# Patient Record
Sex: MALE | Race: BLACK OR AFRICAN AMERICAN | NOT HISPANIC OR LATINO | Employment: UNEMPLOYED | URBAN - METROPOLITAN AREA
[De-identification: names, ages, dates, MRNs, and addresses within clinical notes are randomized per-mention and may not be internally consistent; named-entity substitution may affect disease eponyms.]

---

## 2019-09-22 ENCOUNTER — HOSPITAL ENCOUNTER (EMERGENCY)
Facility: HOSPITAL | Age: 59
Discharge: HOME | End: 2019-09-22
Attending: EMERGENCY MEDICINE

## 2019-09-22 VITALS
BODY MASS INDEX: 23.54 KG/M2 | OXYGEN SATURATION: 98 % | DIASTOLIC BLOOD PRESSURE: 65 MMHG | HEART RATE: 87 BPM | SYSTOLIC BLOOD PRESSURE: 112 MMHG | TEMPERATURE: 99 F | HEIGHT: 67 IN | RESPIRATION RATE: 23 BRPM | WEIGHT: 150 LBS

## 2019-09-22 DIAGNOSIS — R55 SYNCOPE: ICD-10-CM

## 2019-09-22 DIAGNOSIS — R55 SYNCOPE, UNSPECIFIED SYNCOPE TYPE: Primary | ICD-10-CM

## 2019-09-22 DIAGNOSIS — F10.929 ALCOHOLIC INTOXICATION WITH COMPLICATION: ICD-10-CM

## 2019-09-22 LAB
ALBUMIN SERPL BCP-MCNC: 4 G/DL (ref 3.5–5.2)
ALP SERPL-CCNC: 147 U/L (ref 55–135)
ALT SERPL W/O P-5'-P-CCNC: 31 U/L (ref 10–44)
AMPHET+METHAMPHET UR QL: NEGATIVE
ANION GAP SERPL CALC-SCNC: 12 MMOL/L (ref 8–16)
AST SERPL-CCNC: 62 U/L (ref 10–40)
BARBITURATES UR QL SCN>200 NG/ML: NEGATIVE
BASOPHILS # BLD AUTO: 0.03 K/UL (ref 0–0.2)
BASOPHILS NFR BLD: 0.6 % (ref 0–1.9)
BENZODIAZ UR QL SCN>200 NG/ML: NEGATIVE
BILIRUB SERPL-MCNC: 0.3 MG/DL (ref 0.1–1)
BILIRUB UR QL STRIP: NEGATIVE
BNP SERPL-MCNC: 54 PG/ML (ref 0–99)
BUN SERPL-MCNC: 5 MG/DL (ref 6–20)
BZE UR QL SCN: NEGATIVE
CALCIUM SERPL-MCNC: 8.5 MG/DL (ref 8.7–10.5)
CANNABINOIDS UR QL SCN: NORMAL
CHLORIDE SERPL-SCNC: 102 MMOL/L (ref 95–110)
CLARITY UR: CLEAR
CO2 SERPL-SCNC: 23 MMOL/L (ref 23–29)
COLOR UR: ABNORMAL
CREAT SERPL-MCNC: 0.7 MG/DL (ref 0.5–1.4)
CREAT UR-MCNC: 26 MG/DL (ref 23–375)
DIFFERENTIAL METHOD: ABNORMAL
EOSINOPHIL # BLD AUTO: 0 K/UL (ref 0–0.5)
EOSINOPHIL NFR BLD: 0.6 % (ref 0–8)
ERYTHROCYTE [DISTWIDTH] IN BLOOD BY AUTOMATED COUNT: 14.5 % (ref 11.5–14.5)
EST. GFR  (AFRICAN AMERICAN): >60 ML/MIN/1.73 M^2
EST. GFR  (NON AFRICAN AMERICAN): >60 ML/MIN/1.73 M^2
ETHANOL SERPL-MCNC: 276 MG/DL
GLUCOSE SERPL-MCNC: 107 MG/DL (ref 70–110)
GLUCOSE UR QL STRIP: NEGATIVE
HCT VFR BLD AUTO: 35.2 % (ref 40–54)
HGB BLD-MCNC: 12.2 G/DL (ref 14–18)
HGB UR QL STRIP: ABNORMAL
HYALINE CASTS #/AREA URNS LPF: 2 /LPF
KETONES UR QL STRIP: NEGATIVE
LEUKOCYTE ESTERASE UR QL STRIP: NEGATIVE
LYMPHOCYTES # BLD AUTO: 3 K/UL (ref 1–4.8)
LYMPHOCYTES NFR BLD: 63.6 % (ref 18–48)
MCH RBC QN AUTO: 31.3 PG (ref 27–31)
MCHC RBC AUTO-ENTMCNC: 34.7 G/DL (ref 32–36)
MCV RBC AUTO: 90 FL (ref 82–98)
METHADONE UR QL SCN>300 NG/ML: NEGATIVE
MICROSCOPIC COMMENT: ABNORMAL
MONOCYTES # BLD AUTO: 0.6 K/UL (ref 0.3–1)
MONOCYTES NFR BLD: 11.9 % (ref 4–15)
NEUTROPHILS # BLD AUTO: 1.1 K/UL (ref 1.8–7.7)
NEUTROPHILS NFR BLD: 23.5 % (ref 38–73)
NITRITE UR QL STRIP: NEGATIVE
OPIATES UR QL SCN: NEGATIVE
PCP UR QL SCN>25 NG/ML: NEGATIVE
PH UR STRIP: 5 [PH] (ref 5–8)
PLATELET # BLD AUTO: 144 K/UL (ref 150–350)
PMV BLD AUTO: 9 FL (ref 9.2–12.9)
POTASSIUM SERPL-SCNC: 3.6 MMOL/L (ref 3.5–5.1)
PROT SERPL-MCNC: 7.6 G/DL (ref 6–8.4)
PROT UR QL STRIP: NEGATIVE
RBC # BLD AUTO: 3.9 M/UL (ref 4.6–6.2)
RBC #/AREA URNS HPF: 1 /HPF (ref 0–4)
SODIUM SERPL-SCNC: 137 MMOL/L (ref 136–145)
SP GR UR STRIP: 1 (ref 1–1.03)
TOXICOLOGY INFORMATION: NORMAL
TROPONIN I SERPL DL<=0.01 NG/ML-MCNC: <0.006 NG/ML (ref 0–0.03)
TSH SERPL DL<=0.005 MIU/L-ACNC: 0.97 UIU/ML (ref 0.4–4)
URN SPEC COLLECT METH UR: ABNORMAL
UROBILINOGEN UR STRIP-ACNC: NEGATIVE EU/DL
WBC # BLD AUTO: 4.72 K/UL (ref 3.9–12.7)

## 2019-09-22 PROCEDURE — 63600175 PHARM REV CODE 636 W HCPCS: Performed by: EMERGENCY MEDICINE

## 2019-09-22 PROCEDURE — 93010 EKG 12-LEAD: ICD-10-PCS | Mod: ,,, | Performed by: INTERNAL MEDICINE

## 2019-09-22 PROCEDURE — 93005 ELECTROCARDIOGRAM TRACING: CPT

## 2019-09-22 PROCEDURE — 80307 DRUG TEST PRSMV CHEM ANLYZR: CPT

## 2019-09-22 PROCEDURE — 84443 ASSAY THYROID STIM HORMONE: CPT

## 2019-09-22 PROCEDURE — 83880 ASSAY OF NATRIURETIC PEPTIDE: CPT

## 2019-09-22 PROCEDURE — 93010 ELECTROCARDIOGRAM REPORT: CPT | Mod: ,,, | Performed by: INTERNAL MEDICINE

## 2019-09-22 PROCEDURE — 80053 COMPREHEN METABOLIC PANEL: CPT

## 2019-09-22 PROCEDURE — 84484 ASSAY OF TROPONIN QUANT: CPT

## 2019-09-22 PROCEDURE — 81000 URINALYSIS NONAUTO W/SCOPE: CPT | Mod: 59

## 2019-09-22 PROCEDURE — 99285 EMERGENCY DEPT VISIT HI MDM: CPT | Mod: 25

## 2019-09-22 PROCEDURE — 80320 DRUG SCREEN QUANTALCOHOLS: CPT

## 2019-09-22 PROCEDURE — 85025 COMPLETE CBC W/AUTO DIFF WBC: CPT

## 2019-09-22 RX ADMIN — SODIUM CHLORIDE 1000 ML: 0.9 INJECTION, SOLUTION INTRAVENOUS at 07:09

## 2019-09-22 NOTE — ED TRIAGE NOTES
"Patient alert and oriented and able to ambulate to the stretcher with possible seizure or syncopal episode. Patient states he remembers watching the game then was told he "passed out" and urinated on himself. Patient denies seizure history  "

## 2019-09-23 NOTE — ED PROVIDER NOTES
Encounter Date: 9/22/2019       History     Chief Complaint   Patient presents with    Loss of Consciousness     pt states he was watching saints game and drinking etoh, sitting in chair and passed out, urinated on self. pt aaox3, denies complaints     60 yo male presents via EMS s/p syncope. Patient was outside all day drinking alcohol and watching the game. He was seated in a chair. Per his nephew's report to EMS, patient passed out and fell out of the chair. He urinated on himself. No tonic-clonic activity. No head trauma. Patient denies chest pain or shortness of breath. He does not recall the incident but remembers waking up on the ground. He denies pain. He did eat prior to episode.     No PMH.     Patient has not seen a doctor in years.         Review of patient's allergies indicates:  No Known Allergies  No past medical history on file.  No past surgical history on file.  No family history on file.  Social History     Tobacco Use    Smoking status: Not on file   Substance Use Topics    Alcohol use: Not on file    Drug use: Not on file     Review of Systems   Constitutional: Negative for appetite change.   HENT: Negative for nosebleeds.    Eyes: Negative for pain.   Respiratory: Negative for shortness of breath.    Cardiovascular: Negative for chest pain.   Gastrointestinal: Negative for vomiting.   Genitourinary: Negative for dysuria.   Musculoskeletal: Negative for neck pain.   Skin: Negative for wound.   Neurological: Positive for syncope.       Physical Exam     Initial Vitals [09/22/19 1844]   BP Pulse Resp Temp SpO2   104/72 76 18 98 °F (36.7 °C) 99 %      MAP       --         Physical Exam    Nursing note and vitals reviewed.  Constitutional: He appears well-developed and well-nourished. He is not diaphoretic.   Awake, alert, nontoxic.   HENT:   Head: Normocephalic and atraumatic.   Mouth/Throat: Oropharynx is clear and moist.   Eyes: Conjunctivae and EOM are normal. Pupils are equal, round, and  reactive to light.   Neck: Normal range of motion. Neck supple.   Cardiovascular: Normal rate, regular rhythm, normal heart sounds and intact distal pulses.   No murmur heard.  Pulmonary/Chest: No respiratory distress. He has no wheezes. He has rhonchi (faint bibasilar). He has no rales.   Abdominal: Soft. There is no tenderness. There is no rebound and no guarding.   Musculoskeletal: Normal range of motion. He exhibits no edema or tenderness.   Neurological: He is alert and oriented to person, place, and time. He has normal strength. No cranial nerve deficit.   Moving all extremities   Skin: Skin is warm and dry.   Psychiatric: He has a normal mood and affect.         ED Course   Procedures  Labs Reviewed   CBC W/ AUTO DIFFERENTIAL - Abnormal; Notable for the following components:       Result Value    RBC 3.90 (*)     Hemoglobin 12.2 (*)     Hematocrit 35.2 (*)     Mean Corpuscular Hemoglobin 31.3 (*)     Platelets 144 (*)     MPV 9.0 (*)     Gran # (ANC) 1.1 (*)     Gran% 23.5 (*)     Lymph% 63.6 (*)     All other components within normal limits   COMPREHENSIVE METABOLIC PANEL - Abnormal; Notable for the following components:    BUN, Bld 5 (*)     Calcium 8.5 (*)     Alkaline Phosphatase 147 (*)     AST 62 (*)     All other components within normal limits   URINALYSIS, REFLEX TO URINE CULTURE - Abnormal; Notable for the following components:    Occult Blood UA 1+ (*)     All other components within normal limits    Narrative:     Preferred Collection Type->Urine, Clean Catch   ALCOHOL,MEDICAL (ETHANOL) - Abnormal; Notable for the following components:    Alcohol, Medical, Serum 276 (*)     All other components within normal limits   URINALYSIS MICROSCOPIC - Abnormal; Notable for the following components:    Hyaline Casts, UA 2 (*)     All other components within normal limits    Narrative:     Preferred Collection Type->Urine, Clean Catch   B-TYPE NATRIURETIC PEPTIDE   TROPONIN I   DRUG SCREEN PANEL, URINE  EMERGENCY    Narrative:     Preferred Collection Type->Urine, Clean Catch   TSH     EKG Readings: (Independently Interpreted)   18:56: NSR, HR 63. Normal axis. No ectopy. No STEMI.        Imaging Results          X-Ray Chest AP Portable (Final result)  Result time 09/22/19 19:28:32    Final result by Ilia Keating MD (09/22/19 19:28:32)                 Impression:      1. No acute cardiopulmonary process, hypoventilatory exam.      Electronically signed by: Ilia Keating MD  Date:    09/22/2019  Time:    19:28             Narrative:    EXAMINATION:  XR CHEST AP PORTABLE    CLINICAL HISTORY:  syncope;    TECHNIQUE:  Single frontal view of the chest was performed.    COMPARISON:  None    FINDINGS:  The cardiomediastinal silhouette is not enlarged, noting calcification of the aorta..  There is no pleural effusion.  The trachea is midline.  The lungs are symmetrically expanded bilaterally without evidence of acute parenchymal process. No large focal consolidation seen.  There is no pneumothorax.  The osseous structures are remarkable for degenerative change..                              X-Rays:   Independently Interpreted Readings:   Other Readings:  CXR NAD    Medical Decision Making:   Initial Assessment:   60 yo male s/p syncopal event today.  Differential Diagnosis:   Ddx includes orthostatic or vasovagal syncope, dehydration, YESSICA, dysrhythmia, hypoglycemia, intoxication, ICH, CVA, other.  Independently Interpreted Test(s):   I have ordered and independently interpreted X-rays - see prior notes.  I have ordered and independently interpreted EKG Reading(s) - see prior notes  Clinical Tests:   Lab Tests: Ordered and Reviewed  Radiological Study: Ordered and Reviewed  Medical Tests: Ordered and Reviewed  ED Management:  EKG no STEMI, no dysrhythmia.    CXR NAD.    Labs reassuring aside from EtOH 276.    I suspect patient passed out due to EtOH intoxication and heat exposure. I discussed this with patient and  patient's son. Patient ambulated around the ED with steady gait. His speech is not slurred. He is not clinically intoxicated at this time. Dc'ed with son.                       Clinical Impression:       ICD-10-CM ICD-9-CM   1. Syncope, unspecified syncope type R55 780.2   2. Syncope R55 780.2   3. Alcoholic intoxication with complication F10.929 305.00                                Joslyn Garcia MD  09/24/19 1829

## 2019-09-23 NOTE — ED NOTES
Assumed care from morning shift. Patient alert and oriented. Verbalized he remembers what happened. Denies pain.

## 2022-07-15 ENCOUNTER — HOSPITAL ENCOUNTER (INPATIENT)
Facility: HOSPITAL | Age: 62
LOS: 6 days | Discharge: HOME | DRG: 641 | End: 2022-07-21
Attending: EMERGENCY MEDICINE | Admitting: HOSPITALIST
Payer: MEDICAID

## 2022-07-15 DIAGNOSIS — F10.10 ALCOHOL ABUSE: ICD-10-CM

## 2022-07-15 DIAGNOSIS — R07.9 CHEST PAIN: ICD-10-CM

## 2022-07-15 DIAGNOSIS — R31.9 URINARY TRACT INFECTION WITH HEMATURIA, SITE UNSPECIFIED: ICD-10-CM

## 2022-07-15 DIAGNOSIS — R06.02 SOB (SHORTNESS OF BREATH): ICD-10-CM

## 2022-07-15 DIAGNOSIS — R53.83 FATIGUE: ICD-10-CM

## 2022-07-15 DIAGNOSIS — R79.89 ELEVATED LFTS: ICD-10-CM

## 2022-07-15 DIAGNOSIS — E87.1 HYPONATREMIA: Primary | ICD-10-CM

## 2022-07-15 DIAGNOSIS — N39.0 URINARY TRACT INFECTION WITH HEMATURIA, SITE UNSPECIFIED: ICD-10-CM

## 2022-07-15 PROBLEM — R73.9 HYPERGLYCEMIA: Status: ACTIVE | Noted: 2022-07-15

## 2022-07-15 PROBLEM — F17.200 TOBACCO USE DISORDER: Status: ACTIVE | Noted: 2022-07-15

## 2022-07-15 PROBLEM — E87.6 HYPOKALEMIA: Status: ACTIVE | Noted: 2022-07-15

## 2022-07-15 PROBLEM — R13.10 DYSPHAGIA: Status: ACTIVE | Noted: 2022-07-15

## 2022-07-15 PROBLEM — D64.9 NORMOCYTIC ANEMIA: Status: ACTIVE | Noted: 2022-07-15

## 2022-07-15 LAB
ALBUMIN SERPL BCP-MCNC: 2.3 G/DL (ref 3.5–5.2)
ALP SERPL-CCNC: 125 U/L (ref 55–135)
ALT SERPL W/O P-5'-P-CCNC: 67 U/L (ref 10–44)
AMPHET+METHAMPHET UR QL: NEGATIVE
ANION GAP SERPL CALC-SCNC: 12 MMOL/L (ref 8–16)
ANION GAP SERPL CALC-SCNC: 16 MMOL/L (ref 8–16)
ANION GAP SERPL CALC-SCNC: 16 MMOL/L (ref 8–16)
AST SERPL-CCNC: 116 U/L (ref 10–40)
BACTERIA #/AREA URNS HPF: ABNORMAL /HPF
BARBITURATES UR QL SCN>200 NG/ML: NEGATIVE
BASOPHILS # BLD AUTO: 0.03 K/UL (ref 0–0.2)
BASOPHILS NFR BLD: 0.2 % (ref 0–1.9)
BENZODIAZ UR QL SCN>200 NG/ML: NEGATIVE
BILIRUB SERPL-MCNC: 1.7 MG/DL (ref 0.1–1)
BILIRUB UR QL STRIP: NEGATIVE
BNP SERPL-MCNC: 49 PG/ML (ref 0–99)
BUN SERPL-MCNC: 38 MG/DL (ref 8–23)
BUN SERPL-MCNC: 39 MG/DL (ref 8–23)
BUN SERPL-MCNC: 40 MG/DL (ref 8–23)
BZE UR QL SCN: NEGATIVE
CALCIUM SERPL-MCNC: 8 MG/DL (ref 8.7–10.5)
CALCIUM SERPL-MCNC: 8.4 MG/DL (ref 8.7–10.5)
CALCIUM SERPL-MCNC: 8.6 MG/DL (ref 8.7–10.5)
CANNABINOIDS UR QL SCN: NEGATIVE
CHLORIDE SERPL-SCNC: 83 MMOL/L (ref 95–110)
CHLORIDE SERPL-SCNC: 87 MMOL/L (ref 95–110)
CHLORIDE SERPL-SCNC: 91 MMOL/L (ref 95–110)
CLARITY UR: ABNORMAL
CO2 SERPL-SCNC: 18 MMOL/L (ref 23–29)
CO2 SERPL-SCNC: 19 MMOL/L (ref 23–29)
CO2 SERPL-SCNC: 20 MMOL/L (ref 23–29)
COLOR UR: YELLOW
CORTIS SERPL-MCNC: 25 UG/DL
CREAT SERPL-MCNC: 1.2 MG/DL (ref 0.5–1.4)
CREAT SERPL-MCNC: 1.3 MG/DL (ref 0.5–1.4)
CREAT SERPL-MCNC: 1.4 MG/DL (ref 0.5–1.4)
CREAT UR-MCNC: 34.3 MG/DL (ref 23–375)
CTP QC/QA: YES
DIFFERENTIAL METHOD: ABNORMAL
EOSINOPHIL # BLD AUTO: 0 K/UL (ref 0–0.5)
EOSINOPHIL NFR BLD: 0.1 % (ref 0–8)
ERYTHROCYTE [DISTWIDTH] IN BLOOD BY AUTOMATED COUNT: 13.1 % (ref 11.5–14.5)
EST. GFR  (AFRICAN AMERICAN): >60 ML/MIN/1.73 M^2
EST. GFR  (NON AFRICAN AMERICAN): 53 ML/MIN/1.73 M^2
EST. GFR  (NON AFRICAN AMERICAN): 58 ML/MIN/1.73 M^2
EST. GFR  (NON AFRICAN AMERICAN): >60 ML/MIN/1.73 M^2
ETHANOL SERPL-MCNC: 135 MG/DL
FERRITIN SERPL-MCNC: 4238 NG/ML (ref 20–300)
GLUCOSE SERPL-MCNC: 103 MG/DL (ref 70–110)
GLUCOSE SERPL-MCNC: 122 MG/DL (ref 70–110)
GLUCOSE SERPL-MCNC: 125 MG/DL (ref 70–110)
GLUCOSE SERPL-MCNC: 95 MG/DL (ref 70–110)
GLUCOSE UR QL STRIP: NEGATIVE
HCT VFR BLD AUTO: 29.3 % (ref 40–54)
HGB BLD-MCNC: 11 G/DL (ref 14–18)
HGB UR QL STRIP: ABNORMAL
IMM GRANULOCYTES # BLD AUTO: 0.4 K/UL (ref 0–0.04)
IMM GRANULOCYTES NFR BLD AUTO: 2.2 % (ref 0–0.5)
IRON SERPL-MCNC: 38 UG/DL (ref 45–160)
KETONES UR QL STRIP: NEGATIVE
LACTATE SERPL-SCNC: 2.5 MMOL/L (ref 0.5–2.2)
LEUKOCYTE ESTERASE UR QL STRIP: ABNORMAL
LYMPHOCYTES # BLD AUTO: 1.9 K/UL (ref 1–4.8)
LYMPHOCYTES NFR BLD: 10.5 % (ref 18–48)
MAGNESIUM SERPL-MCNC: 3.1 MG/DL (ref 1.6–2.6)
MCH RBC QN AUTO: 30.5 PG (ref 27–31)
MCHC RBC AUTO-ENTMCNC: 37.5 G/DL (ref 32–36)
MCV RBC AUTO: 81 FL (ref 82–98)
METHADONE UR QL SCN>300 NG/ML: NEGATIVE
MICROSCOPIC COMMENT: ABNORMAL
MONOCYTES # BLD AUTO: 2.4 K/UL (ref 0.3–1)
MONOCYTES NFR BLD: 13.4 % (ref 4–15)
NEUTROPHILS # BLD AUTO: 13.2 K/UL (ref 1.8–7.7)
NEUTROPHILS NFR BLD: 73.6 % (ref 38–73)
NITRITE UR QL STRIP: NEGATIVE
NRBC BLD-RTO: 0 /100 WBC
OPIATES UR QL SCN: NEGATIVE
PCP UR QL SCN>25 NG/ML: NEGATIVE
PH UR STRIP: 6 [PH] (ref 5–8)
PLATELET # BLD AUTO: 155 K/UL (ref 150–450)
PMV BLD AUTO: 11.7 FL (ref 9.2–12.9)
POCT GLUCOSE: 125 MG/DL (ref 70–110)
POTASSIUM SERPL-SCNC: 3.3 MMOL/L (ref 3.5–5.1)
POTASSIUM SERPL-SCNC: 3.6 MMOL/L (ref 3.5–5.1)
POTASSIUM SERPL-SCNC: 3.7 MMOL/L (ref 3.5–5.1)
PROCALCITONIN SERPL IA-MCNC: 8.43 NG/ML
PROT SERPL-MCNC: 7.9 G/DL (ref 6–8.4)
PROT UR QL STRIP: ABNORMAL
RBC # BLD AUTO: 3.61 M/UL (ref 4.6–6.2)
RBC #/AREA URNS HPF: 28 /HPF (ref 0–4)
SARS-COV-2 RDRP RESP QL NAA+PROBE: NEGATIVE
SATURATED IRON: 19 % (ref 20–50)
SODIUM SERPL-SCNC: 118 MMOL/L (ref 136–145)
SODIUM SERPL-SCNC: 121 MMOL/L (ref 136–145)
SODIUM SERPL-SCNC: 123 MMOL/L (ref 136–145)
SP GR UR STRIP: 1 (ref 1–1.03)
T4 FREE SERPL-MCNC: 1.26 NG/DL (ref 0.71–1.51)
TOTAL IRON BINDING CAPACITY: 204 UG/DL (ref 250–450)
TOXICOLOGY INFORMATION: NORMAL
TRANSFERRIN SERPL-MCNC: 138 MG/DL (ref 200–375)
TROPONIN I SERPL DL<=0.01 NG/ML-MCNC: <0.006 NG/ML (ref 0–0.03)
TSH SERPL DL<=0.005 MIU/L-ACNC: 0.14 UIU/ML (ref 0.4–4)
URN SPEC COLLECT METH UR: ABNORMAL
UROBILINOGEN UR STRIP-ACNC: NEGATIVE EU/DL
WBC # BLD AUTO: 17.87 K/UL (ref 3.9–12.7)
WBC #/AREA URNS HPF: >100 /HPF (ref 0–5)

## 2022-07-15 PROCEDURE — 83935 ASSAY OF URINE OSMOLALITY: CPT | Performed by: EMERGENCY MEDICINE

## 2022-07-15 PROCEDURE — 84443 ASSAY THYROID STIM HORMONE: CPT | Performed by: HOSPITALIST

## 2022-07-15 PROCEDURE — 82533 TOTAL CORTISOL: CPT | Performed by: HOSPITALIST

## 2022-07-15 PROCEDURE — 83036 HEMOGLOBIN GLYCOSYLATED A1C: CPT | Performed by: HOSPITALIST

## 2022-07-15 PROCEDURE — 84145 PROCALCITONIN (PCT): CPT | Performed by: HOSPITALIST

## 2022-07-15 PROCEDURE — A4216 STERILE WATER/SALINE, 10 ML: HCPCS | Performed by: HOSPITALIST

## 2022-07-15 PROCEDURE — 93005 ELECTROCARDIOGRAM TRACING: CPT

## 2022-07-15 PROCEDURE — U0002 COVID-19 LAB TEST NON-CDC: HCPCS | Performed by: EMERGENCY MEDICINE

## 2022-07-15 PROCEDURE — 82962 GLUCOSE BLOOD TEST: CPT

## 2022-07-15 PROCEDURE — 83605 ASSAY OF LACTIC ACID: CPT | Performed by: HOSPITALIST

## 2022-07-15 PROCEDURE — 83880 ASSAY OF NATRIURETIC PEPTIDE: CPT | Performed by: EMERGENCY MEDICINE

## 2022-07-15 PROCEDURE — 25000003 PHARM REV CODE 250: Performed by: EMERGENCY MEDICINE

## 2022-07-15 PROCEDURE — 87186 SC STD MICRODIL/AGAR DIL: CPT | Performed by: EMERGENCY MEDICINE

## 2022-07-15 PROCEDURE — 84466 ASSAY OF TRANSFERRIN: CPT | Performed by: HOSPITALIST

## 2022-07-15 PROCEDURE — 87077 CULTURE AEROBIC IDENTIFY: CPT | Performed by: EMERGENCY MEDICINE

## 2022-07-15 PROCEDURE — 83735 ASSAY OF MAGNESIUM: CPT | Performed by: EMERGENCY MEDICINE

## 2022-07-15 PROCEDURE — 36415 COLL VENOUS BLD VENIPUNCTURE: CPT | Performed by: HOSPITALIST

## 2022-07-15 PROCEDURE — 85025 COMPLETE CBC W/AUTO DIFF WBC: CPT | Performed by: EMERGENCY MEDICINE

## 2022-07-15 PROCEDURE — 81000 URINALYSIS NONAUTO W/SCOPE: CPT | Mod: 59 | Performed by: EMERGENCY MEDICINE

## 2022-07-15 PROCEDURE — 80048 BASIC METABOLIC PNL TOTAL CA: CPT | Mod: 91,XB | Performed by: HOSPITALIST

## 2022-07-15 PROCEDURE — 25000003 PHARM REV CODE 250: Performed by: HOSPITALIST

## 2022-07-15 PROCEDURE — 20000000 HC ICU ROOM

## 2022-07-15 PROCEDURE — 87040 BLOOD CULTURE FOR BACTERIA: CPT | Performed by: HOSPITALIST

## 2022-07-15 PROCEDURE — 87088 URINE BACTERIA CULTURE: CPT | Performed by: EMERGENCY MEDICINE

## 2022-07-15 PROCEDURE — 93010 EKG 12-LEAD: ICD-10-PCS | Mod: ,,, | Performed by: INTERNAL MEDICINE

## 2022-07-15 PROCEDURE — 82077 ASSAY SPEC XCP UR&BREATH IA: CPT | Performed by: EMERGENCY MEDICINE

## 2022-07-15 PROCEDURE — 99291 CRITICAL CARE FIRST HOUR: CPT | Mod: 25

## 2022-07-15 PROCEDURE — 84484 ASSAY OF TROPONIN QUANT: CPT | Performed by: EMERGENCY MEDICINE

## 2022-07-15 PROCEDURE — 80074 ACUTE HEPATITIS PANEL: CPT | Performed by: HOSPITALIST

## 2022-07-15 PROCEDURE — 82746 ASSAY OF FOLIC ACID SERUM: CPT | Performed by: HOSPITALIST

## 2022-07-15 PROCEDURE — 80053 COMPREHEN METABOLIC PANEL: CPT | Performed by: EMERGENCY MEDICINE

## 2022-07-15 PROCEDURE — 87086 URINE CULTURE/COLONY COUNT: CPT | Performed by: EMERGENCY MEDICINE

## 2022-07-15 PROCEDURE — 82607 VITAMIN B-12: CPT | Performed by: HOSPITALIST

## 2022-07-15 PROCEDURE — 93010 ELECTROCARDIOGRAM REPORT: CPT | Mod: ,,, | Performed by: INTERNAL MEDICINE

## 2022-07-15 PROCEDURE — 80307 DRUG TEST PRSMV CHEM ANLYZR: CPT | Performed by: EMERGENCY MEDICINE

## 2022-07-15 PROCEDURE — 82728 ASSAY OF FERRITIN: CPT | Performed by: HOSPITALIST

## 2022-07-15 PROCEDURE — 84439 ASSAY OF FREE THYROXINE: CPT | Performed by: HOSPITALIST

## 2022-07-15 PROCEDURE — 63600175 PHARM REV CODE 636 W HCPCS: Performed by: HOSPITALIST

## 2022-07-15 RX ORDER — CLONIDINE HYDROCHLORIDE 0.1 MG/1
0.1 TABLET ORAL EVERY 8 HOURS PRN
Status: DISCONTINUED | OUTPATIENT
Start: 2022-07-15 | End: 2022-07-21 | Stop reason: HOSPADM

## 2022-07-15 RX ORDER — LANOLIN ALCOHOL/MO/W.PET/CERES
100 CREAM (GRAM) TOPICAL
Status: COMPLETED | OUTPATIENT
Start: 2022-07-15 | End: 2022-07-15

## 2022-07-15 RX ORDER — TALC
6 POWDER (GRAM) TOPICAL NIGHTLY PRN
Status: DISCONTINUED | OUTPATIENT
Start: 2022-07-15 | End: 2022-07-21 | Stop reason: HOSPADM

## 2022-07-15 RX ORDER — LANOLIN ALCOHOL/MO/W.PET/CERES
100 CREAM (GRAM) TOPICAL DAILY
Status: DISCONTINUED | OUTPATIENT
Start: 2022-07-16 | End: 2022-07-21 | Stop reason: HOSPADM

## 2022-07-15 RX ORDER — POTASSIUM CHLORIDE 20 MEQ/1
40 TABLET, EXTENDED RELEASE ORAL ONCE
Status: COMPLETED | OUTPATIENT
Start: 2022-07-15 | End: 2022-07-15

## 2022-07-15 RX ORDER — FOLIC ACID 1 MG/1
1 TABLET ORAL DAILY
Status: DISCONTINUED | OUTPATIENT
Start: 2022-07-16 | End: 2022-07-21 | Stop reason: HOSPADM

## 2022-07-15 RX ORDER — SODIUM CHLORIDE 0.9 % (FLUSH) 0.9 %
10 SYRINGE (ML) INJECTION EVERY 8 HOURS
Status: DISCONTINUED | OUTPATIENT
Start: 2022-07-15 | End: 2022-07-21 | Stop reason: HOSPADM

## 2022-07-15 RX ORDER — SODIUM,POTASSIUM PHOSPHATES 280-250MG
2 POWDER IN PACKET (EA) ORAL
Status: DISCONTINUED | OUTPATIENT
Start: 2022-07-15 | End: 2022-07-21 | Stop reason: HOSPADM

## 2022-07-15 RX ORDER — SODIUM CHLORIDE 9 MG/ML
INJECTION, SOLUTION INTRAVENOUS CONTINUOUS
Status: DISCONTINUED | OUTPATIENT
Start: 2022-07-15 | End: 2022-07-16

## 2022-07-15 RX ORDER — LANOLIN ALCOHOL/MO/W.PET/CERES
800 CREAM (GRAM) TOPICAL
Status: DISCONTINUED | OUTPATIENT
Start: 2022-07-15 | End: 2022-07-21 | Stop reason: HOSPADM

## 2022-07-15 RX ORDER — LORAZEPAM 2 MG/ML
2 INJECTION INTRAMUSCULAR EVERY 4 HOURS PRN
Status: DISCONTINUED | OUTPATIENT
Start: 2022-07-15 | End: 2022-07-16

## 2022-07-15 RX ORDER — AMOXICILLIN 250 MG
1 CAPSULE ORAL 2 TIMES DAILY
Status: DISCONTINUED | OUTPATIENT
Start: 2022-07-15 | End: 2022-07-21 | Stop reason: HOSPADM

## 2022-07-15 RX ORDER — ONDANSETRON 2 MG/ML
4 INJECTION INTRAMUSCULAR; INTRAVENOUS EVERY 8 HOURS PRN
Status: DISCONTINUED | OUTPATIENT
Start: 2022-07-15 | End: 2022-07-21 | Stop reason: HOSPADM

## 2022-07-15 RX ORDER — SODIUM CHLORIDE 9 MG/ML
1000 INJECTION, SOLUTION INTRAVENOUS
Status: DISCONTINUED | OUTPATIENT
Start: 2022-07-15 | End: 2022-07-15

## 2022-07-15 RX ORDER — CYANOCOBALAMIN (VITAMIN B-12) 250 MCG
250 TABLET ORAL DAILY
Status: DISCONTINUED | OUTPATIENT
Start: 2022-07-16 | End: 2022-07-21 | Stop reason: HOSPADM

## 2022-07-15 RX ORDER — ACETAMINOPHEN 325 MG/1
650 TABLET ORAL EVERY 4 HOURS PRN
Status: DISCONTINUED | OUTPATIENT
Start: 2022-07-15 | End: 2022-07-21 | Stop reason: HOSPADM

## 2022-07-15 RX ORDER — PROCHLORPERAZINE EDISYLATE 5 MG/ML
5 INJECTION INTRAMUSCULAR; INTRAVENOUS EVERY 6 HOURS PRN
Status: DISCONTINUED | OUTPATIENT
Start: 2022-07-15 | End: 2022-07-21 | Stop reason: HOSPADM

## 2022-07-15 RX ORDER — NALOXONE HCL 0.4 MG/ML
0.02 VIAL (ML) INJECTION
Status: DISCONTINUED | OUTPATIENT
Start: 2022-07-15 | End: 2022-07-21 | Stop reason: HOSPADM

## 2022-07-15 RX ADMIN — ACETAMINOPHEN 650 MG: 325 TABLET ORAL at 07:07

## 2022-07-15 RX ADMIN — THIAMINE HCL TAB 100 MG 100 MG: 100 TAB at 01:07

## 2022-07-15 RX ADMIN — SODIUM CHLORIDE, SODIUM LACTATE, POTASSIUM CHLORIDE, AND CALCIUM CHLORIDE 2250 ML: .6; .31; .03; .02 INJECTION, SOLUTION INTRAVENOUS at 03:07

## 2022-07-15 RX ADMIN — CEFTRIAXONE 1 G: 1 INJECTION, SOLUTION INTRAVENOUS at 03:07

## 2022-07-15 RX ADMIN — POTASSIUM CHLORIDE 40 MEQ: 1500 TABLET, EXTENDED RELEASE ORAL at 04:07

## 2022-07-15 RX ADMIN — SODIUM CHLORIDE: 0.9 INJECTION, SOLUTION INTRAVENOUS at 05:07

## 2022-07-15 RX ADMIN — Medication 1 TABLET: at 01:07

## 2022-07-15 RX ADMIN — Medication 10 ML: at 09:07

## 2022-07-15 NOTE — PLAN OF CARE
Pt transferred from ED to . Pt a&o x 4. Pt has complete mobility of all extremities. Pt received 2 L bolus of LR in the ER. Currently on  ml/hr. Pt on room air with O2 sats between . Abdominal ultrasound completed. Skin integrity maintained

## 2022-07-15 NOTE — ASSESSMENT & PLAN NOTE
Hard to sort out if this is oropharyngeal or esophageal based on history  - for now, will monitor symptoms

## 2022-07-15 NOTE — PHARMACY MED REC
"Admission Medication History     The home medication history was taken by Laila Epstein CPhT.    You may go to "Admission" then "Reconcile Home Medications" tabs to review and/or act upon these items.      The home medication list has been updated by the Pharmacy department.    Please read ALL comments highlighted in yellow.    Please address this information as you see fit.     Feel free to contact us if you have any questions or require assistance.        Medications listed below were obtained from: Patient/family and Analytic software- [employer]  (Not in a hospital admission)           Patient is taking no medications at this time.      Laila Epstein CPhT.  [phone]                    .          "

## 2022-07-15 NOTE — SUBJECTIVE & OBJECTIVE
History reviewed. No pertinent past medical history.    History reviewed. No pertinent surgical history.    Review of patient's allergies indicates:  No Known Allergies    No current facility-administered medications on file prior to encounter.     No current outpatient medications on file prior to encounter.     Family History       Problem Relation (Age of Onset)    Cancer Mother, Father          Tobacco Use    Smoking status: Current Every Day Smoker     Types: Cigarettes    Smokeless tobacco: Not on file   Substance and Sexual Activity    Alcohol use: Yes     Comment: 3 quarts of beer daily    Drug use: Not Currently    Sexual activity: Not on file     Review of Systems   Constitutional:  Positive for activity change, appetite change and fatigue. Negative for chills and fever.   HENT:  Positive for trouble swallowing. Negative for congestion and sore throat.    Respiratory:  Positive for cough. Negative for chest tightness, shortness of breath and wheezing.    Cardiovascular:  Negative for chest pain, palpitations and leg swelling.   Gastrointestinal:  Negative for abdominal distention, abdominal pain, constipation, diarrhea, nausea and vomiting.   Genitourinary:  Positive for dysuria. Negative for difficulty urinating.   Musculoskeletal:  Negative for arthralgias and myalgias.   Skin:  Negative for rash and wound.   Neurological:  Positive for dizziness and weakness. Negative for tremors, syncope, speech difficulty, light-headedness, numbness and headaches.   Psychiatric/Behavioral:  Positive for confusion.    Objective:     Vital Signs (Most Recent):  Temp: 97.9 °F (36.6 °C) (07/15/22 1222)  Pulse: 76 (07/15/22 1523)  Resp: 18 (07/15/22 1523)  BP: 135/81 (07/15/22 1523)  SpO2: 99 % (07/15/22 1523) Vital Signs (24h Range):  Temp:  [97.9 °F (36.6 °C)] 97.9 °F (36.6 °C)  Pulse:  [74-80] 76  Resp:  [17-18] 18  SpO2:  [99 %-100 %] 99 %  BP: (118-135)/(64-81) 135/81     Weight: 74.8 kg (165 lb)  Body mass index  is 26.63 kg/m².    Physical Exam  Vitals and nursing note reviewed.   Constitutional:       General: He is not in acute distress.     Appearance: He is not ill-appearing or toxic-appearing.   HENT:      Head: Normocephalic and atraumatic.      Nose: Nose normal.      Mouth/Throat:      Mouth: Mucous membranes are dry.      Comments: Poor dentition  Eyes:      General: No scleral icterus.     Extraocular Movements: Extraocular movements intact.      Conjunctiva/sclera: Conjunctivae normal.   Cardiovascular:      Rate and Rhythm: Normal rate and regular rhythm.      Pulses: Normal pulses.      Heart sounds: Normal heart sounds. No murmur heard.    No gallop.   Pulmonary:      Effort: Pulmonary effort is normal. No respiratory distress.      Breath sounds: Normal breath sounds. No wheezing or rales.      Comments: Room air  Abdominal:      General: Bowel sounds are normal. There is no distension.      Palpations: Abdomen is soft. There is no mass.      Tenderness: There is no abdominal tenderness. There is no guarding.   Musculoskeletal:      Right lower leg: No edema.      Left lower leg: No edema.   Lymphadenopathy:      Cervical: No cervical adenopathy.   Skin:     General: Skin is warm and dry.   Neurological:      Mental Status: He is alert and oriented to person, place, and time.           Significant Labs: All pertinent labs within the past 24 hours have been reviewed.    Significant Imaging: I have reviewed all pertinent imaging results/findings within the past 24 hours.

## 2022-07-15 NOTE — ASSESSMENT & PLAN NOTE
Drinks 3 quarts of beer daily with last drink 7/14/22.  on admit. Denies history of withdrawals  - CIWA Q4 monitoring  - PRN ativan for withdrawals  - given vitamins in ED and IVF  - continue vitamin supplementation   - check liver US

## 2022-07-15 NOTE — PLAN OF CARE
[facility] - Intensive Care  Initial Discharge Assessment       Primary Care Provider: To Obtain Unable (Will need referral to [facility])    Admission Diagnosis: Alcohol abuse [F10.10]  Hyponatremia [E87.1]  Fatigue [R53.83]  SOB (shortness of breath) [R06.02]  Elevated LFTs [R79.89]  Chest pain [R07.9]  Urinary tract infection with hematuria, site unspecified [N39.0, R31.9]    Admission Date: 7/15/2022  Expected Discharge Date: 7/16/2022    Discharge Barriers Identified: Substance Abuse, Transportation    Payor: MEDICAID / Plan: [employer] [facility] / Product Type: Managed Medicaid /     Extended Emergency Contact Information  Primary Emergency Contact: CONTACT,NO  Relation: None  Secondary Emergency Contact: Siobhan Xiao  Mobile Phone: [phone]  Relation: Sister  Preferred language: English   needed? No    Discharge Plan A: Home  Discharge Plan B: Home with family      [id number] [place] [address] [place] [address]  Phone: [phone] Fax: [phone]      Initial Assessment (most recent)       Adult Discharge Assessment - 07/15/22 1813          Discharge Assessment    Assessment Type Discharge Planning Assessment     Confirmed/corrected address, phone number and insurance Yes     Confirmed Demographics Correct on Facesheet     Source of Information patient;health record     When was your last doctors appointment? --   Has not been to a doctor in a long time.  Cannot remember last time.    Does patient/caregiver understand observation status --   n/a    Communicated BROOKE with patient/caregiver Date not available/Unable to determine     Reason For Admission Alcohol Abuse     Lives With sibling(s)   Lives with Jose sawyer    Facility Arrived From: home     Do you expect to return to your current living situation? Yes     Prior to hospitilization cognitive status: Alert/Oriented     Current  cognitive status: Alert/Oriented     Walking or Climbing Stairs Difficulty none     Dressing/Bathing Difficulty none     Equipment Currently Used at Home none     Readmission within 30 days? No     Patient currently being followed by outpatient case management? No     Do you currently have service(s) that help you manage your care at home? No     Do you take prescription medications? No     Do you have prescription coverage? Yes     Coverage [employer]     Do you have any problems affording any of your prescribed medications? TBD     Is the patient taking medications as prescribed? --   Patient reported that he does not take any medication    Who is going to help you get home at discharge? unknown     How do you get to doctors appointments? other (see comments)   Patient stated that he does not have transportation.  SW advised him that he could use Medicaid transportation to get to appointments.    Are you on dialysis? No     Do you take coumadin? No     Discharge Plan A Home     Discharge Plan B Home with family     DME Needed Upon Discharge  none     Discharge Plan discussed with: Patient     Discharge Barriers Identified Substance Abuse;Transportation        Relationship/Environment    Name(s) of Who Lives With Patient sister                 Patient does not have a PCP and [facility] that [facility]  He stated that he does not take any medication.  He does not have transportation to appointments (if scheduled).  SABRA advised patient that he could use Medicaid transportation to get to his doctor appointments.

## 2022-07-15 NOTE — ED PROVIDER NOTES
"Encounter Date: 7/15/2022    SCRIBE #1 NOTE: I, Sydnie Barragan, am scribing for, and in the presence of,  Vega Taylor MD. I have scribed the following portions of the note - Other sections scribed: HPI, ROS, PE.       History     Chief Complaint   Patient presents with    Fatigue     Pt to ER with c/o increasing fatigue, weight loss, and syncople episode " about a month ago." Pt reports rides bike about 2 miles a day but has not  been able to do that lately due to fatigue      Paty Jiménez is a 62 y.o. male, with a PMHx of Daily Alcohol and Tobacco use, who presents to the ED with complaints of worsening fatigue and generalized weakness, noticed 1 week ago. Patient expresses he is usually able to ride his bike 2 miles a day but now feels "wobbly" and dizzy when attempting to bike. Also reports intermittent SOB on exertion. Patient admits to not eating 3 meals a day, but states "he never has anyway". Endorses daily EtOH use, stating he drinks 2 quarts of beer a day. Also admits to cigarette use, but otherwise denies illicit drug use. No other modifying factors. No other PMHx or SHx reported. Patient denies recent trauma, fever, chest pain, N/V/D, cough, congestion, rhinorrhea, dysuria or other associated symptoms.     The history is provided by the patient. No  was used.     Review of patient's allergies indicates:  No Known Allergies  History reviewed. No pertinent past medical history.  History reviewed. No pertinent surgical history.  History reviewed. No pertinent family history.  Social History     Tobacco Use    Smoking status: Current Every Day Smoker     Types: Cigarettes   Substance Use Topics    Alcohol use: Yes     Review of Systems   Constitutional: Positive for fatigue. Negative for fever.   HENT: Negative for congestion, rhinorrhea and sore throat.    Eyes: Negative for visual disturbance.   Respiratory: Positive for shortness of breath. Negative for cough.    Cardiovascular: " Negative for chest pain.   Gastrointestinal: Negative for abdominal pain, diarrhea, nausea and vomiting.   Genitourinary: Negative for dysuria.   Musculoskeletal: Negative for back pain.   Skin: Negative for rash.   Neurological: Positive for dizziness and weakness. Negative for headaches.   All other systems reviewed and are negative.      Physical Exam     Initial Vitals [07/15/22 1222]   BP Pulse Resp Temp SpO2   118/64 80 18 97.9 °F (36.6 °C) 100 %      MAP       --         Physical Exam    Nursing note and vitals reviewed.  HENT:   Head: Normocephalic and atraumatic.   Mouth/Throat: Mucous membranes are normal. Abnormal dentition (poor).   Patent   Eyes: Conjunctivae and EOM are normal. Pupils are equal, round, and reactive to light. Right conjunctiva is not injected. Left conjunctiva is not injected.   sclera anicteric   Neck: Neck supple.    Full passive range of motion without pain.     Cardiovascular: Regular rhythm, S1 normal, S2 normal and normal heart sounds. Exam reveals no gallop and no friction rub.    No murmur heard.  Pulses:       Radial pulses are 2+ on the right side and 2+ on the left side.   Pulmonary/Chest: Effort normal and breath sounds normal. No respiratory distress.   Abdominal: Abdomen is soft. There is no abdominal tenderness.   Musculoskeletal:      Cervical back: Full passive range of motion without pain and neck supple.      Comments: Good active ROM of all extremities. No lower extremity edema or cyanosis.     Neurological: He is alert. No cranial nerve deficit or sensory deficit. Gait normal.   A&Ox4, normal speech   Skin: Skin is warm. No ecchymosis and no rash noted.   Psychiatric: He has a normal mood and affect. Thought content normal.         ED Course   Procedures  Labs Reviewed   CBC W/ AUTO DIFFERENTIAL - Abnormal; Notable for the following components:       Result Value    WBC 17.87 (*)     RBC 3.61 (*)     Hemoglobin 11.0 (*)     Hematocrit 29.3 (*)     MCV 81 (*)      MCHC 37.5 (*)     Immature Granulocytes 2.2 (*)     Gran # (ANC) 13.2 (*)     Immature Grans (Abs) 0.40 (*)     Mono # 2.4 (*)     Gran % 73.6 (*)     Lymph % 10.5 (*)     All other components within normal limits   COMPREHENSIVE METABOLIC PANEL - Abnormal; Notable for the following components:    Sodium 118 (*)     Potassium 3.3 (*)     Chloride 83 (*)     CO2 19 (*)     Glucose 122 (*)     BUN 40 (*)     Calcium 8.4 (*)     Albumin 2.3 (*)     Total Bilirubin 1.7 (*)      (*)     ALT 67 (*)     eGFR if non  53 (*)     All other components within normal limits    Narrative:     NA critical result(s) called and verbal readback obtained from Georgia Beckford RN by [facility] 07/15/2022 14:15   MAGNESIUM - Abnormal; Notable for the following components:    Magnesium 3.1 (*)     All other components within normal limits   URINALYSIS, REFLEX TO URINE CULTURE - Abnormal; Notable for the following components:    Appearance, UA Hazy (*)     Protein, UA Trace (*)     Occult Blood UA 1+ (*)     Leukocytes, UA 3+ (*)     All other components within normal limits    Narrative:     Specimen Source->Urine   URINALYSIS MICROSCOPIC - Abnormal; Notable for the following components:    RBC, UA 28 (*)     WBC, UA >100 (*)     Bacteria Many (*)     All other components within normal limits    Narrative:     Specimen Source->Urine   ALCOHOL,MEDICAL (ETHANOL) - Abnormal; Notable for the following components:    Alcohol, Serum 135 (*)     All other components within normal limits   POCT GLUCOSE - Abnormal; Notable for the following components:    POCT Glucose 125 (*)     All other components within normal limits   CULTURE, URINE   CULTURE, BLOOD   CULTURE, BLOOD   DRUG SCREEN PANEL, URINE EMERGENCY    Narrative:     Specimen Source->Urine   B-TYPE NATRIURETIC PEPTIDE   TROPONIN I   OSMOLALITY, SERUM   LACTIC ACID, PLASMA   PROCALCITONIN   OSMOLALITY, URINE RANDOM   BASIC METABOLIC PANEL   SARS-COV-2 RDRP GENE   POCT  GLUCOSE MONITORING CONTINUOUS     EKG Readings: (Independently Interpreted)   ekg done at 1246 showing nsr with rate of 71 with short prn. Lvh. No st elevation. Normal axis. qrs 126 and qtc 471.        Imaging Results          X-Ray Chest PA And Lateral (Final result)  Result time 07/15/22 13:38:24    Final result by Peter Linder III, MD (07/15/22 13:38:24)                 Impression:      No acute process seen.      Electronically signed by: Peter Linder MD  Date:    07/15/2022  Time:    13:38             Narrative:    EXAMINATION:  XR CHEST PA AND LATERAL    CLINICAL HISTORY:  Shortness of breath    FINDINGS:  Chest two views: Heart size is normal there is aortic plaque and DJD.  Lungs are clear.                                 Medications   cefTRIAXone (ROCEPHIN) 1 g/50 mL D5W IVPB (1 g Intravenous New Bag 7/15/22 1522)   lactated ringers bolus 2,250 mL (2,250 mLs Intravenous New Bag 7/15/22 1523)   sodium chloride 0.9% flush 10 mL (has no administration in time range)   melatonin tablet 6 mg (has no administration in time range)   senna-docusate 8.6-50 mg per tablet 1 tablet (has no administration in time range)   acetaminophen tablet 650 mg (has no administration in time range)   naloxone 0.4 mg/mL injection 0.02 mg (has no administration in time range)   potassium bicarbonate disintegrating tablet 50 mEq (has no administration in time range)   potassium bicarbonate disintegrating tablet 35 mEq (has no administration in time range)   potassium bicarbonate disintegrating tablet 60 mEq (has no administration in time range)   magnesium oxide tablet 800 mg (has no administration in time range)   magnesium oxide tablet 800 mg (has no administration in time range)   potassium, sodium phosphates 280-160-250 mg packet 2 packet (has no administration in time range)   potassium, sodium phosphates 280-160-250 mg packet 2 packet (has no administration in time range)   potassium, sodium phosphates 280-160-250 mg  packet 2 packet (has no administration in time range)   dextrose 10% bolus 125 mL (has no administration in time range)   dextrose 10% bolus 250 mL (has no administration in time range)   ondansetron injection 4 mg (has no administration in time range)   prochlorperazine injection Soln 5 mg (has no administration in time range)   0.9%  NaCl infusion (has no administration in time range)   cefTRIAXone (ROCEPHIN) 2 g/50 mL D5W IVPB (has no administration in time range)   thiamine tablet 100 mg (100 mg Oral Given 7/15/22 1331)   folic acid-vit B6-vit B12 2.5-25-2 mg tablet 1 tablet (1 tablet Oral Given 7/15/22 1331)     Medical Decision Making:   History:   Old Medical Records: I decided to obtain old medical records.  Initial Assessment:   60-year-old male presenting secondary to fatigue.  Chronic drinker.  Could be electrolyte abnormality, anemia, vitamin deficiency, infection, cardiac, dehydration.  Vitals reassuring.  Patient is ambulatory no distress.  Afebrile.  Less likely septic.  EKG is nonspecific.  Will reassess.    Labs are notable for UTI and hyponatremia.  Patient has elevated white count.  IV fluids.  Admitted to the ICU for further workup management.  IV ceftriaxone.  Lactic acid and blood cultures ordered for the patient.  Lower suspicion of sepsis.  I spoke with Dr. Mejia.     Please put in 35 minutes of critical care due to patient having a high risk of cardiac failure.   Separate from teaching and exclusive of procedure and ekg time  Includes:  Time at bedside  Time reviewing test results  Time discussing case with staff  Time documenting the medical record  Time spent with family members  Time spent with consults  Management    Independently Interpreted Test(s):   I have ordered and independently interpreted EKG Reading(s) - see prior notes  Clinical Tests:   Lab Tests: Ordered and Reviewed  Radiological Study: Ordered and Reviewed  Medical Tests: Ordered and Reviewed          Scribe  Attestation:   Scribe #1: I performed the above scribed service and the documentation accurately describes the services I performed. I attest to the accuracy of the note.                 Clinical Impression:   Final diagnoses:  [R53.83] Fatigue  [R06.02] SOB (shortness of breath)  [E87.1] Hyponatremia (Primary)  [N39.0, R31.9] Urinary tract infection with hematuria, site unspecified  [R79.89] Elevated LFTs  [F10.10] Alcohol abuse          ED Disposition Condition    Admit        I, vega taylor, personally performed the services described in this documentation. All medical record entries made by the scribe were at my direction and in my presence. I have reviewed the chart and agree that the record reflects my personal performance and is accurate and complete.          Vega Taylor MD  07/15/22 3335

## 2022-07-15 NOTE — CARE UPDATE
[facility] [place]  Nurses Note -- 4 Eyes      7/15/2022       Skin assessed on: Admit      [x] No Pressure Injuries Present    [x]Prevention Measures Documented    [] Yes LDA  for Pressure Injury Previously documented     [] Yes New Pressure Injury Discovered   [] LDA for New Pressure Injury Added      Attending RN:  Ute Torres RN     Second RN:  Loyda Klein RN

## 2022-07-15 NOTE — H&P
"[facility] [place], [place] [facility] Medicine  History & Physical    Patient Name: Paty Jiménez  MRN: [id number]  Patient Class: IP- Inpatient  Admission Date: 7/15/2022  Attending Physician: Shania Mejia MD   Primary Care Provider: To Obtain Unable         Patient information was obtained from patient, past medical records and ER records.     Subjective:     Principal Problem:Hyponatremia    Chief Complaint:   Chief Complaint   Patient presents with    Fatigue     Pt to ER with c/o increasing fatigue, weight loss, and syncople episode " about a month ago." Pt reports rides bike about 2 miles a day but has not  been able to do that lately due to fatigue         HPI: Mr Paty Jiménez is a 62 y.o. man who presents with weakness. He states he was in his normal state of health until last week. He says he usually runs 2 miles a day and bikes. He has started to feel dizzy- room spinning, not presyncope- with activity. He has nausea and decreased PO intake because he is not able to swallow normally. He cannot say if food gets stuck when swallowing. No pain with swallowing. No abdominal pain. Says that for the last week whenever he urinates he also defecates. He has no headaches, vision changes, numbness. His sister at bedside says he is more confused than usual.     No prior history of this. No long term medical problems. He states that he drinks 3 quarts of beer daily with last drink yesterday. He denies any history of alcohol withdrawal.     In ED, noted to have Na 118 with no recent prior for comparison.       History reviewed. No pertinent past medical history.    History reviewed. No pertinent surgical history.    Review of patient's allergies indicates:  No Known Allergies    No current facility-administered medications on file prior to encounter.     No current outpatient medications on file prior to encounter.     Family History       Problem Relation (Age of Onset)    Cancer Mother, Father          Tobacco Use "    Smoking status: Current Every Day Smoker     Types: Cigarettes    Smokeless tobacco: Not on file   Substance and Sexual Activity    Alcohol use: Yes     Comment: 3 quarts of beer daily    Drug use: Not Currently    Sexual activity: Not on file     Review of Systems   Constitutional:  Positive for activity change, appetite change and fatigue. Negative for chills and fever.   HENT:  Positive for trouble swallowing. Negative for congestion and sore throat.    Respiratory:  Positive for cough. Negative for chest tightness, shortness of breath and wheezing.    Cardiovascular:  Negative for chest pain, palpitations and leg swelling.   Gastrointestinal:  Negative for abdominal distention, abdominal pain, constipation, diarrhea, nausea and vomiting.   Genitourinary:  Positive for dysuria. Negative for difficulty urinating.   Musculoskeletal:  Negative for arthralgias and myalgias.   Skin:  Negative for rash and wound.   Neurological:  Positive for dizziness and weakness. Negative for tremors, syncope, speech difficulty, light-headedness, numbness and headaches.   Psychiatric/Behavioral:  Positive for confusion.    Objective:     Vital Signs (Most Recent):  Temp: 97.9 °F (36.6 °C) (07/15/22 1222)  Pulse: 76 (07/15/22 1523)  Resp: 18 (07/15/22 1523)  BP: 135/81 (07/15/22 1523)  SpO2: 99 % (07/15/22 1523) Vital Signs (24h Range):  Temp:  [97.9 °F (36.6 °C)] 97.9 °F (36.6 °C)  Pulse:  [74-80] 76  Resp:  [17-18] 18  SpO2:  [99 %-100 %] 99 %  BP: (118-135)/(64-81) 135/81     Weight: 74.8 kg (165 lb)  Body mass index is 26.63 kg/m².    Physical Exam  Vitals and nursing note reviewed.   Constitutional:       General: He is not in acute distress.     Appearance: He is not ill-appearing or toxic-appearing.   HENT:      Head: Normocephalic and atraumatic.      Nose: Nose normal.      Mouth/Throat:      Mouth: Mucous membranes are dry.      Comments: Poor dentition  Eyes:      General: No scleral icterus.     Extraocular  Movements: Extraocular movements intact.      Conjunctiva/sclera: Conjunctivae normal.   Cardiovascular:      Rate and Rhythm: Normal rate and regular rhythm.      Pulses: Normal pulses.      Heart sounds: Normal heart sounds. No murmur heard.    No gallop.   Pulmonary:      Effort: Pulmonary effort is normal. No respiratory distress.      Breath sounds: Normal breath sounds. No wheezing or rales.      Comments: Room air  Abdominal:      General: Bowel sounds are normal. There is no distension.      Palpations: Abdomen is soft. There is no mass.      Tenderness: There is no abdominal tenderness. There is no guarding.   Musculoskeletal:      Right lower leg: No edema.      Left lower leg: No edema.   Lymphadenopathy:      Cervical: No cervical adenopathy.   Skin:     General: Skin is warm and dry.   Neurological:      Mental Status: He is alert and oriented to person, place, and time.           Significant Labs: All pertinent labs within the past 24 hours have been reviewed.    Significant Imaging: I have reviewed all pertinent imaging results/findings within the past 24 hours.    Assessment/Plan:     * Hyponatremia  Na 118 on admit. Suspect due to alcohol abuse; however, symptoms seem more acute within the last 1 week  - calculated serum osm= 286-294. Measured serum osm and urine osm pending  - check TSH, cortisol  - not on any Rx that would cause this  - CXR no infiltrate or mass  - check liver US    Start NS at 125  BMP Q4-- goal Na 124-126 over the next 24 hours      Hyperglycemia  Check A1c      Dysphagia  Hard to sort out if this is oropharyngeal or esophageal based on history  - for now, will monitor symptoms      Tobacco use disorder  Patient was counseled on smoking cessation for 4 minutes. Patient smokes 1ppd. We discussed how smoking is detrimental to the patient's health. Prescription for nicotine patch was offered. Patient declined.       Normocytic anemia  Hgb 11, microcytic  - check iron panel, B12,  folate  - no signs of blood loss      UTI (urinary tract infection)  UA positive with symptoms. Continue CTX while awaiting culture results.       Alcohol abuse  Drinks 3 quarts of beer daily with last drink 7/14/22.  on admit. Denies history of withdrawals  - CIWA Q4 monitoring  - PRN ativan for withdrawals  - given vitamins in ED and IVF  - continue vitamin supplementation   - check liver US      Hypokalemia  Replace and monitor          VTE Risk Mitigation (From admission, onward)         Ordered     Place RADHA hose  Until discontinued         07/15/22 1510     Place sequential compression device  Until discontinued         07/15/22 1510     IP VTE LOW RISK PATIENT  Once         07/15/22 1510              Critical care time spent on the evaluation and treatment of severe organ dysfunction, review of pertinent labs and imaging studies, discussions with consulting providers and discussions with patient/family: 30 minutes        Shania Mejia MD  Department of Hospital Medicine   [facility] - Emergency Dept

## 2022-07-15 NOTE — Clinical Note
Diagnosis: Hyponatremia [198519]   Admitting Provider:: PRANEETH VASQUEZ [[id number]]   Future Attending Provider: PASTORA WALLACE [[id number]]   Reason for IP Medical Treatment  (Clinical interventions that can only be accomplished in the IP setting? ) :: hyponatremia   Estimated Length of Stay:: 2 midnights   I certify that Inpatient services for greater than or equal to 2 midnights are medically necessary:: Yes   Plans for Post-Acute care--if anticipated (pick the single best option):: A. No post acute care anticipated at this time

## 2022-07-15 NOTE — ASSESSMENT & PLAN NOTE
Patient was counseled on smoking cessation for 4 minutes. Patient smokes 1ppd. We discussed how smoking is detrimental to the patient's health. Prescription for nicotine patch was offered. Patient declined.

## 2022-07-15 NOTE — HPI
Mr Paty Jmiénez is a 62 y.o. man who presents with weakness. He states he was in his normal state of health until last week. He says he usually runs 2 miles a day and bikes. He has started to feel dizzy- room spinning, not presyncope- with activity. He has nausea and decreased PO intake because he is not able to swallow normally. He cannot say if food gets stuck when swallowing. No pain with swallowing. No abdominal pain. Says that for the last week whenever he urinates he also defecates. He has no headaches, vision changes, numbness. His sister at bedside says he is more confused than usual.     No prior history of this. No long term medical problems. He states that he drinks 3 quarts of beer daily with last drink yesterday. He denies any history of alcohol withdrawal.     In ED, noted to have Na 118 with no recent prior for comparison.

## 2022-07-15 NOTE — CARE UPDATE
[facility] [place] [facility]ensive [facility]  ICU Shift Summary  Date: 7/15/2022      Prehospitalization: Home  Admit Date / LOS : 7/15/2022/ 0 days    Diagnosis: Hyponatremia    Consults:        Active: SW       Needed: N/A     Code Status: Full Code   Advanced Directive: <no information>    LDA:  Lines/Drains/Airways     Peripheral Intravenous Line  Duration                Peripheral IV - Single Lumen 07/15/22 1430 20 G Anterior;Distal;Right Upper Arm <1 day         Peripheral IV - Single Lumen 07/15/22 1600 20 G Right Wrist <1 day              Central Lines/Site/Justification:Patient Does Not Have Central Line  Urinary Cath/Order/Justification:Patient Does Not Have Urinary Catheter    Vasopressors/Infusions:    sodium chloride 0.9% 125 mL/hr at 07/15/22 1717          GOALS: Volume/ Hemodynamic:                       RASS: 0  alert and calm    Pain Management: none       Pain Controlled: not applicable     Rhythm: NSR    Respiratory Device: Room Air                  Most Recent SBT/ SAT: N/A       MOVE Screen: FAIL  ICU Liberation: not applicable    VTE Prophylaxis: Ambulation  Mobility: Ambulatory  Stress Ulcer Prophylaxis: No    Isolation: No active isolations    Dietary:   Current Diet Order   Procedures    Diet Adult Regular (IDDSI Level 7)      Tolerance: yes  Advancement: @ goal    I & O (24h):    Intake/Output Summary (Last 24 hours) at 7/15/2022 1846  Last data filed at 7/15/2022 1717  Gross per 24 hour   Intake --   Output 50 ml   Net -50 ml        Restraints: No    Significant Dates:  Post Op Date: N/A  Rescue Date: N/A  Imaging/ Diagnostics: ABDOMINAL ULTRASOUND    Noteworthy Labs:  See below    COVID Test: (--)  CBC/Anemia Labs: Coags:    Recent Labs   Lab 07/15/22  1317 07/15/22  1701   WBC 17.87*  --    HGB 11.0*  --    HCT 29.3*  --      --    MCV 81*  --    RDW 13.1  --    IRON  --  38*   FERRITIN  --  4,238*    No results for input(s): PT, INR, APTT in the last 168 hours.     Chemistries:   Recent Labs    Lab 07/15/22  1317 07/15/22  1701   * 123*   K 3.3* 3.7   CL 83* 87*   CO2 19* 20*   BUN 40* 39*   CREATININE 1.4 1.3   CALCIUM 8.4* 8.6*   PROT 7.9  --    BILITOT 1.7*  --    ALKPHOS 125  --    ALT 67*  --    *  --    MG 3.1*  --         Cardiac Enzymes: Ejection Fractions:    Recent Labs     07/15/22  1317   TROPONINI <0.006    No results found for: EF     POCT Glucose: HbA1c:    Recent Labs   Lab 07/15/22  1337   POCTGLUCOSE 125*    No results found for: HGBA1C        ICU LOS 2h  Level of Care: Critical Care    Chart Check: 12 HR Done  Shift Summary/Plan for the shift: SEE CARE PLAN NOTE

## 2022-07-15 NOTE — ASSESSMENT & PLAN NOTE
Na 118 on admit. Suspect due to alcohol abuse; however, symptoms seem more acute within the last 1 week  - calculated serum osm= 286-294. Measured serum osm and urine osm pending  - check TSH, cortisol  - not on any Rx that would cause this  - CXR no infiltrate or mass  - check liver US    Start NS at 125  BMP Q4-- goal Na 124-126 over the next 24 hours

## 2022-07-15 NOTE — ED TRIAGE NOTES
Pt presents to ED from home with c/o fatigue x 1 week. Pt states he has been feeling dizzy and unable to go on ihis 2 mile daily bike ride this last week due to feeling tired. Pt also reports loss. Pt denies any chest pain, n/v/d, but reports intermittent shortness of breath on exertion.

## 2022-07-16 PROBLEM — E87.6 HYPOKALEMIA: Status: RESOLVED | Noted: 2022-07-15 | Resolved: 2022-07-16

## 2022-07-16 PROBLEM — D63.8 ANEMIA, CHRONIC DISEASE: Status: ACTIVE | Noted: 2022-07-15

## 2022-07-16 LAB
ALBUMIN SERPL BCP-MCNC: 1.9 G/DL (ref 3.5–5.2)
ALP SERPL-CCNC: 117 U/L (ref 55–135)
ALT SERPL W/O P-5'-P-CCNC: 53 U/L (ref 10–44)
ANION GAP SERPL CALC-SCNC: 10 MMOL/L (ref 8–16)
ANION GAP SERPL CALC-SCNC: 10 MMOL/L (ref 8–16)
ANION GAP SERPL CALC-SCNC: 11 MMOL/L (ref 8–16)
ANION GAP SERPL CALC-SCNC: 12 MMOL/L (ref 8–16)
AST SERPL-CCNC: 93 U/L (ref 10–40)
BASOPHILS # BLD AUTO: 0.04 K/UL (ref 0–0.2)
BASOPHILS NFR BLD: 0.3 % (ref 0–1.9)
BILIRUB DIRECT SERPL-MCNC: 1.5 MG/DL (ref 0.1–0.3)
BILIRUB SERPL-MCNC: 2.1 MG/DL (ref 0.1–1)
BUN SERPL-MCNC: 33 MG/DL (ref 8–23)
BUN SERPL-MCNC: 36 MG/DL (ref 8–23)
BUN SERPL-MCNC: 36 MG/DL (ref 8–23)
BUN SERPL-MCNC: 40 MG/DL (ref 8–23)
CALCIUM SERPL-MCNC: 7.5 MG/DL (ref 8.7–10.5)
CALCIUM SERPL-MCNC: 8.1 MG/DL (ref 8.7–10.5)
CALCIUM SERPL-MCNC: 8.2 MG/DL (ref 8.7–10.5)
CALCIUM SERPL-MCNC: 8.2 MG/DL (ref 8.7–10.5)
CHLORIDE SERPL-SCNC: 92 MMOL/L (ref 95–110)
CHLORIDE SERPL-SCNC: 93 MMOL/L (ref 95–110)
CHLORIDE SERPL-SCNC: 96 MMOL/L (ref 95–110)
CHLORIDE SERPL-SCNC: 96 MMOL/L (ref 95–110)
CO2 SERPL-SCNC: 21 MMOL/L (ref 23–29)
CO2 SERPL-SCNC: 21 MMOL/L (ref 23–29)
CO2 SERPL-SCNC: 23 MMOL/L (ref 23–29)
CO2 SERPL-SCNC: 24 MMOL/L (ref 23–29)
CREAT SERPL-MCNC: 1.1 MG/DL (ref 0.5–1.4)
CREAT SERPL-MCNC: 1.2 MG/DL (ref 0.5–1.4)
CREAT SERPL-MCNC: 1.2 MG/DL (ref 0.5–1.4)
CREAT SERPL-MCNC: 1.3 MG/DL (ref 0.5–1.4)
DIFFERENTIAL METHOD: ABNORMAL
EOSINOPHIL # BLD AUTO: 0 K/UL (ref 0–0.5)
EOSINOPHIL NFR BLD: 0.1 % (ref 0–8)
ERYTHROCYTE [DISTWIDTH] IN BLOOD BY AUTOMATED COUNT: 13.2 % (ref 11.5–14.5)
EST. GFR  (AFRICAN AMERICAN): >60 ML/MIN/1.73 M^2
EST. GFR  (NON AFRICAN AMERICAN): 58 ML/MIN/1.73 M^2
EST. GFR  (NON AFRICAN AMERICAN): >60 ML/MIN/1.73 M^2
ESTIMATED AVG GLUCOSE: 120 MG/DL (ref 68–131)
FOLATE SERPL-MCNC: 18.8 NG/ML (ref 4–24)
GLUCOSE SERPL-MCNC: 120 MG/DL (ref 70–110)
GLUCOSE SERPL-MCNC: 121 MG/DL (ref 70–110)
GLUCOSE SERPL-MCNC: 122 MG/DL (ref 70–110)
GLUCOSE SERPL-MCNC: 130 MG/DL (ref 70–110)
HBA1C MFR BLD: 5.8 % (ref 4–5.6)
HCT VFR BLD AUTO: 25.8 % (ref 40–54)
HGB BLD-MCNC: 10.1 G/DL (ref 14–18)
IMM GRANULOCYTES # BLD AUTO: 0.41 K/UL (ref 0–0.04)
IMM GRANULOCYTES NFR BLD AUTO: 2.8 % (ref 0–0.5)
LYMPHOCYTES # BLD AUTO: 0.8 K/UL (ref 1–4.8)
LYMPHOCYTES NFR BLD: 5.4 % (ref 18–48)
MAGNESIUM SERPL-MCNC: 2.7 MG/DL (ref 1.6–2.6)
MCH RBC QN AUTO: 30.3 PG (ref 27–31)
MCHC RBC AUTO-ENTMCNC: 39.1 G/DL (ref 32–36)
MCV RBC AUTO: 78 FL (ref 82–98)
MONOCYTES # BLD AUTO: 1.5 K/UL (ref 0.3–1)
MONOCYTES NFR BLD: 10.4 % (ref 4–15)
NEUTROPHILS # BLD AUTO: 12 K/UL (ref 1.8–7.7)
NEUTROPHILS NFR BLD: 81 % (ref 38–73)
NRBC BLD-RTO: 0 /100 WBC
OSMOLALITY UR: 194 MOSM/KG (ref 50–1200)
PHOSPHATE SERPL-MCNC: 2.4 MG/DL (ref 2.7–4.5)
PLATELET # BLD AUTO: 163 K/UL (ref 150–450)
PMV BLD AUTO: 9.8 FL (ref 9.2–12.9)
POLYCHROMASIA BLD QL SMEAR: ABNORMAL
POTASSIUM SERPL-SCNC: 3.6 MMOL/L (ref 3.5–5.1)
POTASSIUM SERPL-SCNC: 3.7 MMOL/L (ref 3.5–5.1)
POTASSIUM SERPL-SCNC: 4 MMOL/L (ref 3.5–5.1)
POTASSIUM SERPL-SCNC: 4.2 MMOL/L (ref 3.5–5.1)
PROT SERPL-MCNC: 6.5 G/DL (ref 6–8.4)
RBC # BLD AUTO: 3.33 M/UL (ref 4.6–6.2)
ROULEAUX BLD QL SMEAR: PRESENT
SODIUM SERPL-SCNC: 127 MMOL/L (ref 136–145)
SODIUM SERPL-SCNC: 128 MMOL/L (ref 136–145)
TARGETS BLD QL SMEAR: ABNORMAL
VIT B12 SERPL-MCNC: >2000 PG/ML (ref 210–950)
WBC # BLD AUTO: 14.84 K/UL (ref 3.9–12.7)

## 2022-07-16 PROCEDURE — 80076 HEPATIC FUNCTION PANEL: CPT | Performed by: HOSPITALIST

## 2022-07-16 PROCEDURE — 84100 ASSAY OF PHOSPHORUS: CPT | Performed by: HOSPITALIST

## 2022-07-16 PROCEDURE — 25000003 PHARM REV CODE 250: Performed by: INTERNAL MEDICINE

## 2022-07-16 PROCEDURE — 63600175 PHARM REV CODE 636 W HCPCS: Mod: JG | Performed by: INTERNAL MEDICINE

## 2022-07-16 PROCEDURE — A4216 STERILE WATER/SALINE, 10 ML: HCPCS | Performed by: HOSPITALIST

## 2022-07-16 PROCEDURE — 63600175 PHARM REV CODE 636 W HCPCS: Performed by: HOSPITALIST

## 2022-07-16 PROCEDURE — 80048 BASIC METABOLIC PNL TOTAL CA: CPT | Mod: 91 | Performed by: HOSPITALIST

## 2022-07-16 PROCEDURE — 36415 COLL VENOUS BLD VENIPUNCTURE: CPT | Performed by: HOSPITALIST

## 2022-07-16 PROCEDURE — 11000001 HC ACUTE MED/SURG PRIVATE ROOM

## 2022-07-16 PROCEDURE — 85025 COMPLETE CBC W/AUTO DIFF WBC: CPT | Performed by: HOSPITALIST

## 2022-07-16 PROCEDURE — 25000003 PHARM REV CODE 250: Performed by: HOSPITALIST

## 2022-07-16 PROCEDURE — 80048 BASIC METABOLIC PNL TOTAL CA: CPT | Performed by: HOSPITALIST

## 2022-07-16 PROCEDURE — 83735 ASSAY OF MAGNESIUM: CPT | Performed by: HOSPITALIST

## 2022-07-16 RX ORDER — DESMOPRESSIN ACETATE 4 UG/ML
2 INJECTION, SOLUTION INTRAVENOUS; SUBCUTANEOUS ONCE
Status: COMPLETED | OUTPATIENT
Start: 2022-07-16 | End: 2022-07-16

## 2022-07-16 RX ORDER — DEXTROSE MONOHYDRATE 50 MG/ML
INJECTION, SOLUTION INTRAVENOUS CONTINUOUS
Status: DISCONTINUED | OUTPATIENT
Start: 2022-07-16 | End: 2022-07-16

## 2022-07-16 RX ORDER — MUPIROCIN 20 MG/G
OINTMENT TOPICAL 2 TIMES DAILY
Status: DISCONTINUED | OUTPATIENT
Start: 2022-07-16 | End: 2022-07-21 | Stop reason: HOSPADM

## 2022-07-16 RX ADMIN — Medication 10 ML: at 02:07

## 2022-07-16 RX ADMIN — Medication 10 ML: at 05:07

## 2022-07-16 RX ADMIN — DESMOPRESSIN ACETATE 2 MCG: 4 SOLUTION INTRAVENOUS at 02:07

## 2022-07-16 RX ADMIN — FOLIC ACID 1 MG: 1 TABLET ORAL at 08:07

## 2022-07-16 RX ADMIN — MUPIROCIN: 20 OINTMENT TOPICAL at 09:07

## 2022-07-16 RX ADMIN — Medication 10 ML: at 09:07

## 2022-07-16 RX ADMIN — THIAMINE HCL TAB 100 MG 100 MG: 100 TAB at 08:07

## 2022-07-16 RX ADMIN — CEFTRIAXONE 2 G: 2 INJECTION, SOLUTION INTRAVENOUS at 02:07

## 2022-07-16 RX ADMIN — SODIUM CHLORIDE: 0.9 INJECTION, SOLUTION INTRAVENOUS at 01:07

## 2022-07-16 RX ADMIN — CYANOCOBALAMIN TAB 250 MCG 250 MCG: 250 TAB at 08:07

## 2022-07-16 RX ADMIN — DEXTROSE: 5 SOLUTION INTRAVENOUS at 02:07

## 2022-07-16 RX ADMIN — THERA TABS 1 TABLET: TAB at 08:07

## 2022-07-16 NOTE — NURSING
[facility], [facility] - [place]  Nurses Note -- 4 Eyes      7/16/2022       Skin assessed on 7/16/2022    [x] No Pressure Injuries Present    []Prevention Measures Documented    [] Yes LDA  for Pressure Injury Previously documented     [] Yes New Pressure Injury Discovered   [] LDA for New Pressure Injury Added      Attending RN:  Keila Weiner LPN     Second RN:  Ramírez Xiao RN

## 2022-07-16 NOTE — NURSING
Pt arrived from ICU via bed transport.AAOx4 Pt oriented to room and information on communication board, and medication regimen. Bed low adequate lighting provided, side rails x2 up, call bell in reach. Vitals per chart. Patient denied having any acute distress at this time. None observed.

## 2022-07-16 NOTE — CARE UPDATE
[facility] [place] Intensive Care  ICU Shift Summary  Date: 7/16/2022      Prehospitalization: Home  Admit Date / LOS : 7/15/2022/ 1 days    Diagnosis: Hyponatremia    Consults:        Active: N/A       Needed: N/A     Code Status: Full Code   Advanced Directive: <no information>    LDA:  Lines/Drains/Airways     Peripheral Intravenous Line  Duration                Peripheral IV - Single Lumen 07/15/22 1430 20 G Anterior;Distal;Right Upper Arm <1 day         Peripheral IV - Single Lumen 07/15/22 1600 20 G Right Wrist <1 day              Central Lines/Site/Justification:Patient Does Not Have Central Line  Urinary Cath/Order/Justification:Patient Does Not Have Urinary Catheter    Vasopressors/Infusions:    dextrose 5 % 75 mL/hr at 07/16/22 0300          GOALS: Volume/ Hemodynamic: N/A                     RASS: 0  alert and calm    Pain Management: none       Pain Controlled: yes     Rhythm: NSR    Respiratory Device: Room Air                  Most Recent SBT/ SAT: N/A       MOVE Screen: PASS  ICU Liberation: not applicable    VTE Prophylaxis: Pharm  Mobility: Bedrest  Stress Ulcer Prophylaxis: No    Isolation: No active isolations    Dietary:   Current Diet Order   Procedures    Diet Adult Regular (IDDSI Level 7)      Tolerance: yes  Advancement: @ goal    I & O (24h):    Intake/Output Summary (Last 24 hours) at 7/16/2022 0507  Last data filed at 7/16/2022 0300  Gross per 24 hour   Intake 2633.95 ml   Output 1800 ml   Net 833.95 ml        Restraints: No    Significant Dates:  Post Op Date: N/A  Rescue Date: N/A  Imaging/ Diagnostics: N/A    Noteworthy Labs:  See labs    COVID Test: (--)  CBC/Anemia Labs: Coags:    Recent Labs   Lab 07/15/22  1317 07/15/22  1701   WBC 17.87*  --    HGB 11.0*  --    HCT 29.3*  --      --    MCV 81*  --    RDW 13.1  --    IRON  --  38*   FERRITIN  --  4,238*    No results for input(s): PT, INR, APTT in the last 168 hours.     Chemistries:   Recent Labs   Lab 07/15/22  1317  07/15/22  1701 07/15/22  2106 07/16/22  0052 07/16/22  0319   *   < > 121* 128*  --    K 3.3*   < > 3.6 4.2  --    CL 83*   < > 91* 96  --    CO2 19*   < > 18* 21*  --    BUN 40*   < > 38* 40*  --    CREATININE 1.4   < > 1.2 1.3  --    CALCIUM 8.4*   < > 8.0* 7.5*  --    PROT 7.9  --   --   --  6.5   BILITOT 1.7*  --   --   --  2.1*   ALKPHOS 125  --   --   --  117   ALT 67*  --   --   --  53*   *  --   --   --  93*   MG 3.1*  --   --   --  2.7*   PHOS  --   --   --   --  2.4*    < > = values in this interval not displayed.        Cardiac Enzymes: Ejection Fractions:    Recent Labs     07/15/22  1317   TROPONINI <0.006    No results found for: EF     POCT Glucose: HbA1c:    Recent Labs   Lab 07/15/22  1337   POCTGLUCOSE 125*    No results found for: HGBA1C        ICU LOS 12h  Level of Care: Critical Care    Chart Check: 24 HR Done  Shift Summary/Plan for the shift: see care plan note

## 2022-07-16 NOTE — ASSESSMENT & PLAN NOTE
Drinks 3 quarts of beer daily with last drink 7/14/22.  on admit. Denies history of withdrawals. No signs of withdrawal. No cirrhosis on US  - CIWA Q4 monitoring  - PRN ativan for withdrawals  - continue vitamin supplementation

## 2022-07-16 NOTE — NURSING TRANSFER
Nursing Transfer Note      7/16/2022     Reason patient is being transferred: change in level of care    Transfer To: [address]     Transfer via wheelchair    Transfer with n/a    Transported by [facility], transport    Medicines sent: n/a    Any special needs or follow-up needed: none    Chart send with patient: Yes    Notified: pt will notify    Patient reassessed at: 07/16 9405    Upon arrival to floor: n/a

## 2022-07-16 NOTE — PLAN OF CARE
Pt remains in the ICU on room air, AAOx4, max temp 101.9, PRN Tylenol given, pt's sodium level jumped from 121 to 128 over four hours, MD aware, fluid switched to D5 @ 75 mL/hour, Desmopressin given, 2400 mL urine output on this shift, safety maintained, no new injuries, falls, or skin breakdown, will continue to monitor.

## 2022-07-16 NOTE — SUBJECTIVE & OBJECTIVE
Interval History: Feeling much better today. Tolerating PO.     Review of Systems   Constitutional:  Negative for chills, fatigue and fever.   Respiratory:  Negative for shortness of breath.    Cardiovascular:  Negative for chest pain.   Gastrointestinal:  Negative for abdominal pain.   Genitourinary:  Positive for difficulty urinating.   Neurological:  Negative for weakness and numbness.   Psychiatric/Behavioral:  Negative for confusion.    Objective:     Vital Signs (Most Recent):  Temp: 98.2 °F (36.8 °C) (07/16/22 1115)  Pulse: 69 (07/16/22 1300)  Resp: 19 (07/16/22 1300)  BP: (!) 141/74 (07/16/22 1300)  SpO2: (!) 90 % (07/16/22 1300)   Vital Signs (24h Range):  Temp:  [97.8 °F (36.6 °C)-101.9 °F (38.8 °C)] 98.2 °F (36.8 °C)  Pulse:  [] 69  Resp:  [14-42] 19  SpO2:  [90 %-100 %] 90 %  BP: (111-159)/() 141/74     Weight: 74.6 kg (164 lb 7.4 oz)  Body mass index is 26.55 kg/m².    Intake/Output Summary (Last 24 hours) at 7/16/2022 1344  Last data filed at 7/16/2022 1300  Gross per 24 hour   Intake 3571.21 ml   Output 4075 ml   Net -503.79 ml      Physical Exam  Vitals and nursing note reviewed.   Constitutional:       General: He is not in acute distress.     Appearance: He is not ill-appearing or toxic-appearing.   HENT:      Head: Normocephalic and atraumatic.      Nose: Nose normal.      Mouth/Throat:      Mouth: Mucous membranes are moist.   Cardiovascular:      Rate and Rhythm: Normal rate and regular rhythm.      Pulses: Normal pulses.      Heart sounds: Normal heart sounds.   Pulmonary:      Effort: Pulmonary effort is normal.      Breath sounds: Normal breath sounds.      Comments: Room air  Abdominal:      General: Bowel sounds are normal. There is no distension.      Palpations: Abdomen is soft.      Tenderness: There is no abdominal tenderness. There is no guarding.   Musculoskeletal:      Right lower leg: No edema.      Left lower leg: No edema.   Skin:     General: Skin is warm and dry.    Neurological:      Mental Status: He is alert and oriented to person, place, and time.       Significant Labs: All pertinent labs within the past 24 hours have been reviewed.    Significant Imaging: I have reviewed all pertinent imaging results/findings within the past 24 hours.

## 2022-07-16 NOTE — PROGRESS NOTES
[facility] Medicine  Progress Note    Patient Name: Paty Jiménez  MRN: [id number]  Patient Class: IP- Inpatient   Admission Date: 7/15/2022  Length of Stay: 1 days  Attending Physician: Shania Mejia MD  Primary Care Provider: To Obtain Unable        Subjective:     Principal Problem:Hyponatremia        HPI:  Mr Paty Jiménez is a 62 y.o. man who presents with weakness. He states he was in his normal state of health until last week. He says he usually runs 2 miles a day and bikes. He has started to feel dizzy- room spinning, not presyncope- with activity. He has nausea and decreased PO intake because he is not able to swallow normally. He cannot say if food gets stuck when swallowing. No pain with swallowing. No abdominal pain. Says that for the last week whenever he urinates he also defecates. He has no headaches, vision changes, numbness. His sister at bedside says he is more confused than usual.     No prior history of this. No long term medical problems. He states that he drinks 3 quarts of beer daily with last drink yesterday. He denies any history of alcohol withdrawal.     In ED, noted to have Na 118 with no recent prior for comparison.       Overview/Hospital Course:  Mr Paty Jiménez is a 62 y.o. man admitted with symptomatic hyponatremia. Started NS. Overcorrected and started D5. Stabilized Na. Now off IVF, continuing diet and monitoring Na. Most likely cause is alcohol use. TSH, cortisol unremarkable. On no Rx at home. US no cirrhosis. Also with UTI, started CTX and flomax for urinary hesitancy.       Interval History: Feeling much better today. Tolerating PO.     Review of Systems   Constitutional:  Negative for chills, fatigue and fever.   Respiratory:  Negative for shortness of breath.    Cardiovascular:  Negative for chest pain.   Gastrointestinal:  Negative for abdominal pain.   Genitourinary:  Positive for difficulty urinating.   Neurological:  Negative for weakness and  numbness.   Psychiatric/Behavioral:  Negative for confusion.    Objective:     Vital Signs (Most Recent):  Temp: 98.2 °F (36.8 °C) (07/16/22 1115)  Pulse: 69 (07/16/22 1300)  Resp: 19 (07/16/22 1300)  BP: (!) 141/74 (07/16/22 1300)  SpO2: (!) 90 % (07/16/22 1300)   Vital Signs (24h Range):  Temp:  [97.8 °F (36.6 °C)-101.9 °F (38.8 °C)] 98.2 °F (36.8 °C)  Pulse:  [] 69  Resp:  [14-42] 19  SpO2:  [90 %-100 %] 90 %  BP: (111-159)/() 141/74     Weight: 74.6 kg (164 lb 7.4 oz)  Body mass index is 26.55 kg/m².    Intake/Output Summary (Last 24 hours) at 7/16/2022 1344  Last data filed at 7/16/2022 1300  Gross per 24 hour   Intake 3571.21 ml   Output 4075 ml   Net -503.79 ml      Physical Exam  Vitals and nursing note reviewed.   Constitutional:       General: He is not in acute distress.     Appearance: He is not ill-appearing or toxic-appearing.   HENT:      Head: Normocephalic and atraumatic.      Nose: Nose normal.      Mouth/Throat:      Mouth: Mucous membranes are moist.   Cardiovascular:      Rate and Rhythm: Normal rate and regular rhythm.      Pulses: Normal pulses.      Heart sounds: Normal heart sounds.   Pulmonary:      Effort: Pulmonary effort is normal.      Breath sounds: Normal breath sounds.      Comments: Room air  Abdominal:      General: Bowel sounds are normal. There is no distension.      Palpations: Abdomen is soft.      Tenderness: There is no abdominal tenderness. There is no guarding.   Musculoskeletal:      Right lower leg: No edema.      Left lower leg: No edema.   Skin:     General: Skin is warm and dry.   Neurological:      Mental Status: He is alert and oriented to person, place, and time.       Significant Labs: All pertinent labs within the past 24 hours have been reviewed.    Significant Imaging: I have reviewed all pertinent imaging results/findings within the past 24 hours.      Assessment/Plan:      * Hyponatremia  Na 118 on admit. Due to alcohol abuse  - calculated serum  osm= 286-294. Measured serum osm and urine osm pending  - TSH, cortisol unremarkable  - not on any Rx that would cause this  - CXR no infiltrate or mass  - liver US no cirrhosis    Started NS. Na overcorrected. Started D5w. Na now stabilized at 127  - DC IVF  - BMP BID    Discussed alcohol cessation       Hyperglycemia  Check A1c- pending     Dysphagia  Resolved with correction of hypoNa      Tobacco use disorder  Patient was counseled on smoking cessation for 4 minutes. Patient smokes 1ppd. We discussed how smoking is detrimental to the patient's health. Prescription for nicotine patch was offered. Patient declined.       Anemia, chronic disease  Hgb 11. Iron deficiency + anemia of chronic disease  - outpatient follow up for screening colonoscopy     UTI (urinary tract infection)  UCx EColi  - continue CTX  - with urinary hesitancy- started flomax    Alcohol abuse  Drinks 3 quarts of beer daily with last drink 7/14/22.  on admit. Denies history of withdrawals. No signs of withdrawal. No cirrhosis on US  - CIWA Q4 monitoring  - PRN ativan for withdrawals  - continue vitamin supplementation         VTE Risk Mitigation (From admission, onward)         Ordered     Place RADHA hose  Until discontinued         07/15/22 1510     Place sequential compression device  Until discontinued         07/15/22 1510     IP VTE LOW RISK PATIENT  Once         07/15/22 1510                Discharge Planning   BROOKE:      Code Status: Full Code   Is the patient medically ready for discharge?:     Reason for patient still in hospital (select all that apply): Patient trending condition  Discharge Plan A: Home            Shania Mejia MD  Department of Hospital Medicine   [facility] [place] - Intensive Care

## 2022-07-16 NOTE — PROVIDER TRANSFER
Mr Paty Jiménez is a 62 y.o. man admitted with symptomatic hyponatremia due to alcohol use. Na now 127. Off IVF, continue diet. Discussed alcohol cessation. Also with UTI, on CTX.     To do  - BMP BID    Shania Mejia MD  07/16/2022  1:53 PM

## 2022-07-16 NOTE — ASSESSMENT & PLAN NOTE
Hgb 11. Iron deficiency + anemia of chronic disease  - outpatient follow up for screening colonoscopy

## 2022-07-16 NOTE — HOSPITAL COURSE
Mr Paty Jiménez is a 62 y.o. man admitted to ICU on 07/15/22 with symptomatic hyponatremia. Started NS. Overcorrected and started D5. Stabilized Na. Now off IVF, continuing diet and monitoring Na. Most likely cause is alcohol use. TSH, cortisol unremarkable. On no Rx at home that may have caused hyponatremia. US abdomen with no cirrhosis. Also with UTI, started CTX and flomax for urinary hesitancy. Urine culture with E.coli. ID was consulted for continue WBC count of 20K. ID did not think this was infection and Abx stopped.  Rec: dental exam and out patient follow up with heme/onc. Patient was discharged to home on 7/21/22. Activity as tolerated. Diet regular. Follow up with PCP and heme/onc/dentist 1-2 weeks.

## 2022-07-16 NOTE — EICU
EICU FOLLOWUP NOTE:    Called for:  Rapid correction of hyponatremia    Telemetry was reviewed. Medical records including notes, labs and imaging were reviewed.    DISCUSSED with bedside nurse.    ASSESSMENT AND PLAN:    Serum sodium is correcting too rapidly.  I will stop normal saline and start him on D5 water.  He had urine output of about 1 L in the last 5 hours since the beginning of this shift.  I will give him 2 mcg of desmopressin.  I will continue to monitor his serum sodium every 4 hours and adjust further based on speed of correction.    [facility]. Please call as needed    Leobardo Felder MD  [facility]  [phone]

## 2022-07-17 PROBLEM — D72.829 LEUKOCYTOSIS: Status: ACTIVE | Noted: 2022-07-17

## 2022-07-17 LAB
ALBUMIN SERPL BCP-MCNC: 1.7 G/DL (ref 3.5–5.2)
ALP SERPL-CCNC: 108 U/L (ref 55–135)
ALT SERPL W/O P-5'-P-CCNC: 53 U/L (ref 10–44)
ANION GAP SERPL CALC-SCNC: 8 MMOL/L (ref 8–16)
ANION GAP SERPL CALC-SCNC: 8 MMOL/L (ref 8–16)
AST SERPL-CCNC: 81 U/L (ref 10–40)
BACTERIA UR CULT: ABNORMAL
BASOPHILS # BLD AUTO: 0.03 K/UL (ref 0–0.2)
BASOPHILS NFR BLD: 0.2 % (ref 0–1.9)
BILIRUB DIRECT SERPL-MCNC: 1.4 MG/DL (ref 0.1–0.3)
BILIRUB SERPL-MCNC: 2 MG/DL (ref 0.1–1)
BUN SERPL-MCNC: 28 MG/DL (ref 8–23)
BUN SERPL-MCNC: 31 MG/DL (ref 8–23)
CALCIUM SERPL-MCNC: 7.9 MG/DL (ref 8.7–10.5)
CALCIUM SERPL-MCNC: 7.9 MG/DL (ref 8.7–10.5)
CHLORIDE SERPL-SCNC: 95 MMOL/L (ref 95–110)
CHLORIDE SERPL-SCNC: 97 MMOL/L (ref 95–110)
CO2 SERPL-SCNC: 22 MMOL/L (ref 23–29)
CO2 SERPL-SCNC: 26 MMOL/L (ref 23–29)
CREAT SERPL-MCNC: 1.1 MG/DL (ref 0.5–1.4)
CREAT SERPL-MCNC: 1.2 MG/DL (ref 0.5–1.4)
DIFFERENTIAL METHOD: ABNORMAL
EOSINOPHIL # BLD AUTO: 0 K/UL (ref 0–0.5)
EOSINOPHIL NFR BLD: 0.2 % (ref 0–8)
ERYTHROCYTE [DISTWIDTH] IN BLOOD BY AUTOMATED COUNT: 13.7 % (ref 11.5–14.5)
EST. GFR  (AFRICAN AMERICAN): >60 ML/MIN/1.73 M^2
EST. GFR  (AFRICAN AMERICAN): >60 ML/MIN/1.73 M^2
EST. GFR  (NON AFRICAN AMERICAN): >60 ML/MIN/1.73 M^2
EST. GFR  (NON AFRICAN AMERICAN): >60 ML/MIN/1.73 M^2
GLUCOSE SERPL-MCNC: 111 MG/DL (ref 70–110)
GLUCOSE SERPL-MCNC: 128 MG/DL (ref 70–110)
HCT VFR BLD AUTO: 27.8 % (ref 40–54)
HGB BLD-MCNC: 10.9 G/DL (ref 14–18)
IMM GRANULOCYTES # BLD AUTO: 0.91 K/UL (ref 0–0.04)
IMM GRANULOCYTES NFR BLD AUTO: 4.8 % (ref 0–0.5)
LYMPHOCYTES # BLD AUTO: 1.3 K/UL (ref 1–4.8)
LYMPHOCYTES NFR BLD: 6.9 % (ref 18–48)
MAGNESIUM SERPL-MCNC: 2.4 MG/DL (ref 1.6–2.6)
MCH RBC QN AUTO: 30.2 PG (ref 27–31)
MCHC RBC AUTO-ENTMCNC: 39.2 G/DL (ref 32–36)
MCV RBC AUTO: 77 FL (ref 82–98)
MONOCYTES # BLD AUTO: 1.6 K/UL (ref 0.3–1)
MONOCYTES NFR BLD: 8.5 % (ref 4–15)
NEUTROPHILS # BLD AUTO: 15.2 K/UL (ref 1.8–7.7)
NEUTROPHILS NFR BLD: 79.4 % (ref 38–73)
NRBC BLD-RTO: 0 /100 WBC
PHOSPHATE SERPL-MCNC: 2.2 MG/DL (ref 2.7–4.5)
PLATELET # BLD AUTO: 168 K/UL (ref 150–450)
PMV BLD AUTO: 10 FL (ref 9.2–12.9)
POTASSIUM SERPL-SCNC: 3.4 MMOL/L (ref 3.5–5.1)
POTASSIUM SERPL-SCNC: 5.1 MMOL/L (ref 3.5–5.1)
PROT SERPL-MCNC: 6.2 G/DL (ref 6–8.4)
RBC # BLD AUTO: 3.61 M/UL (ref 4.6–6.2)
SODIUM SERPL-SCNC: 127 MMOL/L (ref 136–145)
SODIUM SERPL-SCNC: 129 MMOL/L (ref 136–145)
WBC # BLD AUTO: 19.06 K/UL (ref 3.9–12.7)

## 2022-07-17 PROCEDURE — 80076 HEPATIC FUNCTION PANEL: CPT | Performed by: STUDENT IN AN ORGANIZED HEALTH CARE EDUCATION/TRAINING PROGRAM

## 2022-07-17 PROCEDURE — 63600175 PHARM REV CODE 636 W HCPCS: Performed by: HOSPITALIST

## 2022-07-17 PROCEDURE — 36415 COLL VENOUS BLD VENIPUNCTURE: CPT | Performed by: STUDENT IN AN ORGANIZED HEALTH CARE EDUCATION/TRAINING PROGRAM

## 2022-07-17 PROCEDURE — 25000003 PHARM REV CODE 250: Performed by: HOSPITALIST

## 2022-07-17 PROCEDURE — A4216 STERILE WATER/SALINE, 10 ML: HCPCS | Performed by: HOSPITALIST

## 2022-07-17 PROCEDURE — 80048 BASIC METABOLIC PNL TOTAL CA: CPT | Mod: 91 | Performed by: STUDENT IN AN ORGANIZED HEALTH CARE EDUCATION/TRAINING PROGRAM

## 2022-07-17 PROCEDURE — 36415 COLL VENOUS BLD VENIPUNCTURE: CPT | Performed by: HOSPITALIST

## 2022-07-17 PROCEDURE — 25000003 PHARM REV CODE 250: Performed by: STUDENT IN AN ORGANIZED HEALTH CARE EDUCATION/TRAINING PROGRAM

## 2022-07-17 PROCEDURE — 85025 COMPLETE CBC W/AUTO DIFF WBC: CPT | Performed by: STUDENT IN AN ORGANIZED HEALTH CARE EDUCATION/TRAINING PROGRAM

## 2022-07-17 PROCEDURE — 83735 ASSAY OF MAGNESIUM: CPT | Performed by: STUDENT IN AN ORGANIZED HEALTH CARE EDUCATION/TRAINING PROGRAM

## 2022-07-17 PROCEDURE — 11000001 HC ACUTE MED/SURG PRIVATE ROOM

## 2022-07-17 PROCEDURE — 84100 ASSAY OF PHOSPHORUS: CPT | Performed by: HOSPITALIST

## 2022-07-17 RX ORDER — SODIUM,POTASSIUM PHOSPHATES 280-250MG
2 POWDER IN PACKET (EA) ORAL
Status: COMPLETED | OUTPATIENT
Start: 2022-07-17 | End: 2022-07-18

## 2022-07-17 RX ADMIN — SENNOSIDES AND DOCUSATE SODIUM 1 TABLET: 50; 8.6 TABLET ORAL at 08:07

## 2022-07-17 RX ADMIN — THERA TABS 1 TABLET: TAB at 08:07

## 2022-07-17 RX ADMIN — CYANOCOBALAMIN TAB 250 MCG 250 MCG: 250 TAB at 08:07

## 2022-07-17 RX ADMIN — Medication 2 PACKET: at 12:07

## 2022-07-17 RX ADMIN — MUPIROCIN: 20 OINTMENT TOPICAL at 08:07

## 2022-07-17 RX ADMIN — Medication 10 ML: at 05:07

## 2022-07-17 RX ADMIN — THIAMINE HCL TAB 100 MG 100 MG: 100 TAB at 08:07

## 2022-07-17 RX ADMIN — CEFTRIAXONE 2 G: 2 INJECTION, SOLUTION INTRAVENOUS at 04:07

## 2022-07-17 RX ADMIN — FOLIC ACID 1 MG: 1 TABLET ORAL at 08:07

## 2022-07-17 RX ADMIN — Medication 10 ML: at 11:07

## 2022-07-17 RX ADMIN — POTASSIUM BICARBONATE 50 MEQ: 977.5 TABLET, EFFERVESCENT ORAL at 08:07

## 2022-07-17 RX ADMIN — Medication 10 ML: at 02:07

## 2022-07-17 RX ADMIN — Medication 2 PACKET: at 08:07

## 2022-07-17 RX ADMIN — Medication 2 PACKET: at 04:07

## 2022-07-17 NOTE — PLAN OF CARE
Problem: Adult Inpatient Plan of Care  Goal: Plan of Care Review  Outcome: Ongoing, Progressing  Goal: Patient-Specific Goal (Individualized)  Outcome: Ongoing, Progressing  Goal: Optimal Comfort and Wellbeing  Outcome: Ongoing, Progressing  Goal: Readiness for Transition of Care  Outcome: Ongoing, Progressing   Pt free from falls, injury or any further trauma throughout shift. Continued medications as ordered. No complaints of pain. Pt in no distress. Will cont to monitor.

## 2022-07-17 NOTE — ASSESSMENT & PLAN NOTE
Hgb 11 on admission.   - Iron profile and studies c/w Iron deficiency + anemia of chronic disease  - outpatient follow up for screening colonoscopy   - No evidence of bleeding  - Hgb stable

## 2022-07-17 NOTE — NURSING
Nurses Note -- 4 Eyes      2022   6:22 AM      Skin assessed durin2022      [x] No Pressure Injuries Present    []Prevention Measures Documented      [] Yes- Altered Skin Integrity Present or Discovered   [] LDA Added if Not in Epic (Describe Wound)   [] New Altered Skin Integrity was Present on Admit and Documented in LDA   [] Wound Image Taken    Wound Care Consulted? No    Attending Nurse:  Kaiden Schmid RN     Second RN/Staff Member: Nidia Garcia RN

## 2022-07-17 NOTE — ASSESSMENT & PLAN NOTE
Patient was counseled on smoking cessation for 4 minutes. Patient smokes 1ppd. Dr. Guardado discussed how smoking is detrimental to the patient's health. Prescription for nicotine patch was offered. Patient declined.

## 2022-07-17 NOTE — SUBJECTIVE & OBJECTIVE
Interval History:  No acute overnight events.  Patient afebrile.  Feeling much better today. Tolerating PO.     Review of Systems   Constitutional:  Negative for chills, fatigue and fever.   Respiratory:  Negative for shortness of breath.    Cardiovascular:  Negative for chest pain.   Gastrointestinal:  Negative for abdominal pain.   Genitourinary:  Positive for difficulty urinating.   Neurological:  Negative for weakness and numbness.   Psychiatric/Behavioral:  Negative for confusion.      Objective:     Vital Signs (Most Recent):  Temp: 98.1 °F (36.7 °C) (07/17/22 0713)  Pulse: 61 (07/17/22 0713)  Resp: 20 (07/17/22 0713)  BP: 117/65 (07/17/22 0713)  SpO2: 100 % (07/17/22 0713)   Vital Signs (24h Range):  Temp:  [97.5 °F (36.4 °C)-98.9 °F (37.2 °C)] 98.1 °F (36.7 °C)  Pulse:  [61-84] 61  Resp:  [17-20] 20  SpO2:  [90 %-100 %] 100 %  BP: ()/(61-88) 117/65     Weight: 74.6 kg (164 lb 7.4 oz)  Body mass index is 26.55 kg/m².    Intake/Output Summary (Last 24 hours) at 7/17/2022 1134  Last data filed at 7/17/2022 0635  Gross per 24 hour   Intake 355.92 ml   Output 1150 ml   Net -794.08 ml        Physical Exam  Vitals and nursing note reviewed.   Constitutional:       General: He is not in acute distress.     Appearance: He is not ill-appearing or toxic-appearing.   HENT:      Head: Normocephalic and atraumatic.      Nose: Nose normal.      Mouth/Throat:      Mouth: Mucous membranes are moist.   Cardiovascular:      Rate and Rhythm: Normal rate and regular rhythm.      Pulses: Normal pulses.      Heart sounds: Normal heart sounds.   Pulmonary:      Effort: Pulmonary effort is normal.      Breath sounds: Normal breath sounds.      Comments: Room air  Abdominal:      General: Bowel sounds are normal. There is no distension.      Palpations: Abdomen is soft.      Tenderness: There is no abdominal tenderness. There is no guarding.   Musculoskeletal:      Cervical back: Normal range of motion.      Right lower leg: No  edema.      Left lower leg: No edema.   Skin:     General: Skin is warm and dry.   Neurological:      General: No focal deficit present.      Mental Status: He is alert and oriented to person, place, and time.   Psychiatric:         Mood and Affect: Mood normal.         Thought Content: Thought content normal.       Significant Labs: All pertinent labs within the past 24 hours have been reviewed.    Significant Imaging: I have reviewed all pertinent imaging results/findings within the past 24 hours.

## 2022-07-17 NOTE — ASSESSMENT & PLAN NOTE
-Patient with hx of alcohol use and tobacco abuse  -Pro calcitonin elevated at 8.43 on admit; patient afebrile and non-toxic appearing   -Urine cx with E.coli  -Continue abx  -CBC in AM

## 2022-07-17 NOTE — ASSESSMENT & PLAN NOTE
A1c:   Lab Results   Component Value Date    HGBA1C 5.8 (H) 07/15/2022     No history of diabetes   ADA diet, accuchecks ACHS, hypoglycemic protocol

## 2022-07-17 NOTE — PROGRESS NOTES
[facility] Medicine  Progress Note    Patient Name: Paty Jiménez  MRN: [id number]  Patient Class: IP- Inpatient   Admission Date: 7/15/2022  Length of Stay: 2 days  Attending Physician: Emiliano Cyr DO  Primary Care Provider: To Obtain Unable        Subjective:     Principal Problem:Hyponatremia        HPI:  Mr Paty Jiménez is a 62 y.o. man who presents with weakness. He states he was in his normal state of health until last week. He says he usually runs 2 miles a day and bikes. He has started to feel dizzy- room spinning, not presyncope- with activity. He has nausea and decreased PO intake because he is not able to swallow normally. He cannot say if food gets stuck when swallowing. No pain with swallowing. No abdominal pain. Says that for the last week whenever he urinates he also defecates. He has no headaches, vision changes, numbness. His sister at bedside says he is more confused than usual.     No prior history of this. No long term medical problems. He states that he drinks 3 quarts of beer daily with last drink yesterday. He denies any history of alcohol withdrawal.     In ED, noted to have Na 118 with no recent prior for comparison.       Overview/Hospital Course:  Mr Paty Jiménez is a 62 y.o. man admitted to ICU on 07/15/22 with symptomatic hyponatremia. Started NS. Overcorrected and started D5. Stabilized Na. Now off IVF, continuing diet and monitoring Na. Most likely cause is alcohol use. TSH, cortisol unremarkable. On no Rx at home that may have caused hyponatremia. US abdomen with no cirrhosis. Also with UTI, started CTX and flomax for urinary hesitancy. Urine culture with E.coli. Continue to monitor       Interval History:  No acute overnight events.  Patient afebrile.  Feeling much better today. Tolerating PO.     Review of Systems   Constitutional:  Negative for chills, fatigue and fever.   Respiratory:  Negative for shortness of breath.    Cardiovascular:  Negative for chest  pain.   Gastrointestinal:  Negative for abdominal pain.   Genitourinary:  Positive for difficulty urinating.   Neurological:  Negative for weakness and numbness.   Psychiatric/Behavioral:  Negative for confusion.      Objective:     Vital Signs (Most Recent):  Temp: 98.1 °F (36.7 °C) (07/17/22 0713)  Pulse: 61 (07/17/22 0713)  Resp: 20 (07/17/22 0713)  BP: 117/65 (07/17/22 0713)  SpO2: 100 % (07/17/22 0713)   Vital Signs (24h Range):  Temp:  [97.5 °F (36.4 °C)-98.9 °F (37.2 °C)] 98.1 °F (36.7 °C)  Pulse:  [61-84] 61  Resp:  [17-20] 20  SpO2:  [90 %-100 %] 100 %  BP: ()/(61-88) 117/65     Weight: 74.6 kg (164 lb 7.4 oz)  Body mass index is 26.55 kg/m².    Intake/Output Summary (Last 24 hours) at 7/17/2022 1134  Last data filed at 7/17/2022 0635  Gross per 24 hour   Intake 355.92 ml   Output 1150 ml   Net -794.08 ml        Physical Exam  Vitals and nursing note reviewed.   Constitutional:       General: He is not in acute distress.     Appearance: He is not ill-appearing or toxic-appearing.   HENT:      Head: Normocephalic and atraumatic.      Nose: Nose normal.      Mouth/Throat:      Mouth: Mucous membranes are moist.   Cardiovascular:      Rate and Rhythm: Normal rate and regular rhythm.      Pulses: Normal pulses.      Heart sounds: Normal heart sounds.   Pulmonary:      Effort: Pulmonary effort is normal.      Breath sounds: Normal breath sounds.      Comments: Room air  Abdominal:      General: Bowel sounds are normal. There is no distension.      Palpations: Abdomen is soft.      Tenderness: There is no abdominal tenderness. There is no guarding.   Musculoskeletal:      Cervical back: Normal range of motion.      Right lower leg: No edema.      Left lower leg: No edema.   Skin:     General: Skin is warm and dry.   Neurological:      General: No focal deficit present.      Mental Status: He is alert and oriented to person, place, and time.   Psychiatric:         Mood and Affect: Mood normal.          Thought Content: Thought content normal.       Significant Labs: All pertinent labs within the past 24 hours have been reviewed.    Significant Imaging: I have reviewed all pertinent imaging results/findings within the past 24 hours.      Assessment/Plan:      * Hyponatremia  Na 118 on admit. Due to alcohol abuse  - calculated serum osm= 286-294.   - Measured serum osm pending and urine osm 194  - TSH, cortisol unremarkable  - not on any Rx that would cause this  - CXR no infiltrate or mass  - liver US no cirrhosis    Started NS. Na overcorrected. Started D5w. Na now stabilized at 127  - DC IVF on 07/16  - BMP BID    Discussed alcohol cessation       UTI (urinary tract infection)  UCx EColi  - continue CTX, day 3  - with urinary hesitancy- started flomax    Hyperglycemia  A1c:   Lab Results   Component Value Date    HGBA1C 5.8 (H) 07/15/2022     No history of diabetes   ADA diet, accuchecks ACHS, hypoglycemic protocol    Anemia, chronic disease  Hgb 11 on admission.   - Iron profile and studies c/w Iron deficiency + anemia of chronic disease  - outpatient follow up for screening colonoscopy   - No evidence of bleeding  - Hgb stable     Alcohol abuse  Drinks 3 quarts of beer daily with last drink 7/14/22.  on admit. Denies history of withdrawals. No signs of withdrawal. No cirrhosis on US  - CIWA Q4 monitoring  - PRN ativan for withdrawals  - continue vitamin supplementation       Dysphagia  -Resolved with correction of hypoNa      Tobacco use disorder  Patient was counseled on smoking cessation for 4 minutes. Patient smokes 1ppd. Dr. Guardado discussed how smoking is detrimental to the patient's health. Prescription for nicotine patch was offered. Patient declined.       Leukocytosis  -Patient with hx of alcohol use and tobacco abuse  -Pro calcitonin elevated at 8.43 on admit; patient afebrile and non-toxic appearing   -Urine cx with E.coli  -Continue abx  -CBC in AM        VTE Risk Mitigation (From  admission, onward)         Ordered     Place RADHA hose  Until discontinued         07/15/22 1510     Place sequential compression device  Until discontinued         07/15/22 1510     IP VTE LOW RISK PATIENT  Once         07/15/22 1510                Discharge Planning   BROOKE:      Code Status: Full Code   Is the patient medically ready for discharge?:     Reason for patient still in hospital (select all that apply): Patient trending condition and Treatment  Discharge Plan A: Home                  Emiliano Cyr DO  Department of Hospital Medicine   [facility]

## 2022-07-17 NOTE — ASSESSMENT & PLAN NOTE
Na 118 on admit. Due to alcohol abuse  - calculated serum osm= 286-294.   - Measured serum osm pending and urine osm 194  - TSH, cortisol unremarkable  - not on any Rx that would cause this  - CXR no infiltrate or mass  - liver US no cirrhosis    Started NS. Na overcorrected. Started D5w. Na now stabilized at 127  - DC IVF on 07/16  - BMP BID    Discussed alcohol cessation

## 2022-07-18 LAB
ANION GAP SERPL CALC-SCNC: 10 MMOL/L (ref 8–16)
ANION GAP SERPL CALC-SCNC: 10 MMOL/L (ref 8–16)
BASOPHILS # BLD AUTO: ABNORMAL K/UL (ref 0–0.2)
BASOPHILS NFR BLD: 0 % (ref 0–1.9)
BUN SERPL-MCNC: 25 MG/DL (ref 8–23)
BUN SERPL-MCNC: 26 MG/DL (ref 8–23)
CALCIUM SERPL-MCNC: 8 MG/DL (ref 8.7–10.5)
CALCIUM SERPL-MCNC: 8.1 MG/DL (ref 8.7–10.5)
CHLORIDE SERPL-SCNC: 97 MMOL/L (ref 95–110)
CHLORIDE SERPL-SCNC: 97 MMOL/L (ref 95–110)
CO2 SERPL-SCNC: 24 MMOL/L (ref 23–29)
CO2 SERPL-SCNC: 25 MMOL/L (ref 23–29)
CREAT SERPL-MCNC: 1.2 MG/DL (ref 0.5–1.4)
CREAT SERPL-MCNC: 1.2 MG/DL (ref 0.5–1.4)
DIFFERENTIAL METHOD: ABNORMAL
EOSINOPHIL # BLD AUTO: ABNORMAL K/UL (ref 0–0.5)
EOSINOPHIL NFR BLD: 0 % (ref 0–8)
ERYTHROCYTE [DISTWIDTH] IN BLOOD BY AUTOMATED COUNT: 13.9 % (ref 11.5–14.5)
EST. GFR  (AFRICAN AMERICAN): >60 ML/MIN/1.73 M^2
EST. GFR  (AFRICAN AMERICAN): >60 ML/MIN/1.73 M^2
EST. GFR  (NON AFRICAN AMERICAN): >60 ML/MIN/1.73 M^2
EST. GFR  (NON AFRICAN AMERICAN): >60 ML/MIN/1.73 M^2
GLUCOSE SERPL-MCNC: 105 MG/DL (ref 70–110)
GLUCOSE SERPL-MCNC: 110 MG/DL (ref 70–110)
HAV IGM SERPL QL IA: NEGATIVE
HBV CORE IGM SERPL QL IA: NEGATIVE
HBV SURFACE AG SERPL QL IA: NEGATIVE
HCT VFR BLD AUTO: 25.2 % (ref 40–54)
HCV AB SERPL QL IA: NEGATIVE
HGB BLD-MCNC: 9.9 G/DL (ref 14–18)
IMM GRANULOCYTES # BLD AUTO: ABNORMAL K/UL (ref 0–0.04)
IMM GRANULOCYTES NFR BLD AUTO: ABNORMAL % (ref 0–0.5)
LYMPHOCYTES # BLD AUTO: ABNORMAL K/UL (ref 1–4.8)
LYMPHOCYTES NFR BLD: 7 % (ref 18–48)
MAGNESIUM SERPL-MCNC: 2.3 MG/DL (ref 1.6–2.6)
MCH RBC QN AUTO: 30.6 PG (ref 27–31)
MCHC RBC AUTO-ENTMCNC: 39.3 G/DL (ref 32–36)
MCV RBC AUTO: 78 FL (ref 82–98)
METAMYELOCYTES NFR BLD MANUAL: 1 %
MONOCYTES # BLD AUTO: ABNORMAL K/UL (ref 0.3–1)
MONOCYTES NFR BLD: 5 % (ref 4–15)
MYELOCYTES NFR BLD MANUAL: 1 %
NEUTROPHILS NFR BLD: 85 % (ref 38–73)
NEUTS BAND NFR BLD MANUAL: 1 %
NRBC BLD-RTO: 0 /100 WBC
PHOSPHATE SERPL-MCNC: 3.3 MG/DL (ref 2.7–4.5)
PLATELET # BLD AUTO: 199 K/UL (ref 150–450)
PMV BLD AUTO: 10 FL (ref 9.2–12.9)
POTASSIUM SERPL-SCNC: 3.8 MMOL/L (ref 3.5–5.1)
POTASSIUM SERPL-SCNC: 3.9 MMOL/L (ref 3.5–5.1)
PROCALCITONIN SERPL IA-MCNC: 2.82 NG/ML
RBC # BLD AUTO: 3.24 M/UL (ref 4.6–6.2)
SODIUM SERPL-SCNC: 131 MMOL/L (ref 136–145)
SODIUM SERPL-SCNC: 132 MMOL/L (ref 136–145)
WBC # BLD AUTO: 21.33 K/UL (ref 3.9–12.7)

## 2022-07-18 PROCEDURE — 80048 BASIC METABOLIC PNL TOTAL CA: CPT | Performed by: STUDENT IN AN ORGANIZED HEALTH CARE EDUCATION/TRAINING PROGRAM

## 2022-07-18 PROCEDURE — 36415 COLL VENOUS BLD VENIPUNCTURE: CPT | Performed by: STUDENT IN AN ORGANIZED HEALTH CARE EDUCATION/TRAINING PROGRAM

## 2022-07-18 PROCEDURE — 25000003 PHARM REV CODE 250: Performed by: HOSPITALIST

## 2022-07-18 PROCEDURE — 11000001 HC ACUTE MED/SURG PRIVATE ROOM

## 2022-07-18 PROCEDURE — 84145 PROCALCITONIN (PCT): CPT | Performed by: STUDENT IN AN ORGANIZED HEALTH CARE EDUCATION/TRAINING PROGRAM

## 2022-07-18 PROCEDURE — 84100 ASSAY OF PHOSPHORUS: CPT | Performed by: HOSPITALIST

## 2022-07-18 PROCEDURE — 36415 COLL VENOUS BLD VENIPUNCTURE: CPT | Performed by: HOSPITALIST

## 2022-07-18 PROCEDURE — 83735 ASSAY OF MAGNESIUM: CPT | Performed by: STUDENT IN AN ORGANIZED HEALTH CARE EDUCATION/TRAINING PROGRAM

## 2022-07-18 PROCEDURE — 63600175 PHARM REV CODE 636 W HCPCS: Performed by: HOSPITALIST

## 2022-07-18 PROCEDURE — 25000003 PHARM REV CODE 250: Performed by: STUDENT IN AN ORGANIZED HEALTH CARE EDUCATION/TRAINING PROGRAM

## 2022-07-18 PROCEDURE — A4216 STERILE WATER/SALINE, 10 ML: HCPCS | Performed by: HOSPITALIST

## 2022-07-18 PROCEDURE — 85027 COMPLETE CBC AUTOMATED: CPT | Performed by: STUDENT IN AN ORGANIZED HEALTH CARE EDUCATION/TRAINING PROGRAM

## 2022-07-18 PROCEDURE — 85007 BL SMEAR W/DIFF WBC COUNT: CPT | Performed by: STUDENT IN AN ORGANIZED HEALTH CARE EDUCATION/TRAINING PROGRAM

## 2022-07-18 RX ADMIN — MUPIROCIN: 20 OINTMENT TOPICAL at 09:07

## 2022-07-18 RX ADMIN — Medication 2 PACKET: at 09:07

## 2022-07-18 RX ADMIN — THERA TABS 1 TABLET: TAB at 09:07

## 2022-07-18 RX ADMIN — CYANOCOBALAMIN TAB 250 MCG 250 MCG: 250 TAB at 09:07

## 2022-07-18 RX ADMIN — MUPIROCIN: 20 OINTMENT TOPICAL at 08:07

## 2022-07-18 RX ADMIN — Medication 10 ML: at 02:07

## 2022-07-18 RX ADMIN — FOLIC ACID 1 MG: 1 TABLET ORAL at 09:07

## 2022-07-18 RX ADMIN — CEFTRIAXONE 2 G: 2 INJECTION, SOLUTION INTRAVENOUS at 03:07

## 2022-07-18 RX ADMIN — Medication 10 ML: at 10:07

## 2022-07-18 RX ADMIN — THIAMINE HCL TAB 100 MG 100 MG: 100 TAB at 09:07

## 2022-07-18 RX ADMIN — Medication 10 ML: at 05:07

## 2022-07-18 NOTE — ASSESSMENT & PLAN NOTE
Na 118 on admit. Due to alcohol abuse  - calculated serum osm= 286-294.   - Measured serum osm pending and urine osm 194  - TSH, cortisol unremarkable  - not on any Rx that would cause this  - CXR no infiltrate or mass  - liver US no cirrhosis    Started NS. Na overcorrected. Started D5w.   - DC IVF on 07/16  - Na gradually improving, now at 132  - BMP daily   - Discussed alcohol cessation

## 2022-07-18 NOTE — PLAN OF CARE
Problem: Adult Inpatient Plan of Care  Goal: Plan of Care Review  Outcome: Ongoing, Progressing  Goal: Absence of Hospital-Acquired Illness or Injury  Outcome: Ongoing, Progressing  Goal: Optimal Comfort and Wellbeing  Outcome: Ongoing, Progressing   Pt free from falls, injury or any further trauma throughout shift. Continued medications as ordered. No complaints of pain. Pt in no distress. Will cont to monitor.

## 2022-07-18 NOTE — PLAN OF CARE
[facility]  Discharge Reassessment    Primary Care Provider: To Obtain Unable    Expected Discharge Date: Pending    SW spoke with patient about discharge planning. SW inquired about PCP. Patient stated that he does not have a PCP. SW inquired if patient would like assistance in establishing care. Patient told SW that he is not trying to move into the hospital. SW explained to patient what a PCP is. Patient stated that SW could establish care. SW also inquired about help at home. Patient stated that his sister lives across the street and his niece lives in front of the house. Patient stated that he rides 2 miles a day and does not need any help.     Reassessment (most recent)       Discharge Reassessment - 07/18/22 1301          Discharge Reassessment    Assessment Type Discharge Planning Reassessment (P)      Did the patient's condition or plan change since previous assessment? No (P)      Discharge Plan discussed with: Patient (P)      Communicated BROOKE with patient/caregiver Date not available/Unable to determine (P)      Discharge Plan A Home (P)      Discharge Plan B Home with family (P)      DME Needed Upon Discharge  none (P)      Discharge Barriers Identified Substance Abuse (P)      Why the patient remains in the hospital Requires continued medical care (P)         Post-Acute Status    Post-Acute Authorization Other (P)      Coverage [employer] (P)      Other Status No Post-Acute Service Needs (P)      Discharge Delays None known at this time (P)

## 2022-07-18 NOTE — SUBJECTIVE & OBJECTIVE
Interval History:  No acute overnight events.  Patient afebrile.  Feeling much better today and is almost near baseline. Tolerating PO. Likely can go home tomorrow     Review of Systems   Constitutional:  Negative for chills, fatigue and fever.   Respiratory:  Negative for shortness of breath.    Cardiovascular:  Negative for chest pain.   Gastrointestinal:  Negative for abdominal pain.   Genitourinary:  Positive for difficulty urinating (improving). Negative for dysuria, frequency and hematuria.   Neurological:  Negative for weakness and numbness.   Psychiatric/Behavioral:  Negative for confusion.      Objective:     Vital Signs (Most Recent):  Temp: 97.6 °F (36.4 °C) (07/18/22 0802)  Pulse: 68 (07/18/22 0802)  Resp: 19 (07/18/22 0802)  BP: 103/61 (07/18/22 0802)  SpO2: 100 % (07/18/22 0802)   Vital Signs (24h Range):  Temp:  [97.6 °F (36.4 °C)-99.2 °F (37.3 °C)] 97.6 °F (36.4 °C)  Pulse:  [66-80] 68  Resp:  [18-20] 19  SpO2:  [99 %-100 %] 100 %  BP: (103-150)/(61-76) 103/61     Weight: 74.6 kg (164 lb 7.4 oz)  Body mass index is 26.55 kg/m².    Intake/Output Summary (Last 24 hours) at 7/18/2022 1029  Last data filed at 7/18/2022 0830  Gross per 24 hour   Intake 761.39 ml   Output --   Net 761.39 ml        Physical Exam  Vitals and nursing note reviewed.   Constitutional:       General: He is not in acute distress.     Appearance: He is not ill-appearing or toxic-appearing.   HENT:      Head: Normocephalic and atraumatic.      Nose: Nose normal.      Mouth/Throat:      Mouth: Mucous membranes are moist.   Cardiovascular:      Rate and Rhythm: Normal rate and regular rhythm.      Pulses: Normal pulses.      Heart sounds: Normal heart sounds.   Pulmonary:      Effort: Pulmonary effort is normal.      Breath sounds: Normal breath sounds.      Comments: Room air  Abdominal:      General: Bowel sounds are normal. There is no distension.      Palpations: Abdomen is soft.      Tenderness: There is no abdominal tenderness.  There is no guarding.   Musculoskeletal:      Cervical back: Normal range of motion.      Right lower leg: No edema.      Left lower leg: No edema.   Skin:     General: Skin is warm and dry.   Neurological:      General: No focal deficit present.      Mental Status: He is alert and oriented to person, place, and time.   Psychiatric:         Mood and Affect: Mood normal.         Thought Content: Thought content normal.       Significant Labs: All pertinent labs within the past 24 hours have been reviewed.    Significant Imaging: I have reviewed all pertinent imaging results/findings within the past 24 hours.

## 2022-07-18 NOTE — PROGRESS NOTES
[facility] Medicine  Progress Note    Patient Name: Paty Jiménez  MRN: [id number]  Patient Class: IP- Inpatient   Admission Date: 7/15/2022  Length of Stay: 3 days  Attending Physician: Emiliano Cyr DO  Primary Care Provider: To Obtain Unable        Subjective:     Principal Problem:Hyponatremia        HPI:  Mr Paty Jiménez is a 62 y.o. man who presents with weakness. He states he was in his normal state of health until last week. He says he usually runs 2 miles a day and bikes. He has started to feel dizzy- room spinning, not presyncope- with activity. He has nausea and decreased PO intake because he is not able to swallow normally. He cannot say if food gets stuck when swallowing. No pain with swallowing. No abdominal pain. Says that for the last week whenever he urinates he also defecates. He has no headaches, vision changes, numbness. His sister at bedside says he is more confused than usual.     No prior history of this. No long term medical problems. He states that he drinks 3 quarts of beer daily with last drink yesterday. He denies any history of alcohol withdrawal.     In ED, noted to have Na 118 with no recent prior for comparison.       Overview/Hospital Course:  Mr Paty Jiménez is a 62 y.o. man admitted to ICU on 07/15/22 with symptomatic hyponatremia. Started NS. Overcorrected and started D5. Stabilized Na. Now off IVF, continuing diet and monitoring Na. Most likely cause is alcohol use. TSH, cortisol unremarkable. On no Rx at home that may have caused hyponatremia. US abdomen with no cirrhosis. Also with UTI, started CTX and flomax for urinary hesitancy. Urine culture with E.coli. Continue to monitor       Interval History:  No acute overnight events.  Patient afebrile.  Feeling much better today and is almost near baseline. Tolerating PO. Likely can go home tomorrow     Review of Systems   Constitutional:  Negative for chills, fatigue and fever.   Respiratory:  Negative for  shortness of breath.    Cardiovascular:  Negative for chest pain.   Gastrointestinal:  Negative for abdominal pain.   Genitourinary:  Positive for difficulty urinating (improving). Negative for dysuria, frequency and hematuria.   Neurological:  Negative for weakness and numbness.   Psychiatric/Behavioral:  Negative for confusion.      Objective:     Vital Signs (Most Recent):  Temp: 97.6 °F (36.4 °C) (07/18/22 0802)  Pulse: 68 (07/18/22 0802)  Resp: 19 (07/18/22 0802)  BP: 103/61 (07/18/22 0802)  SpO2: 100 % (07/18/22 0802)   Vital Signs (24h Range):  Temp:  [97.6 °F (36.4 °C)-99.2 °F (37.3 °C)] 97.6 °F (36.4 °C)  Pulse:  [66-80] 68  Resp:  [18-20] 19  SpO2:  [99 %-100 %] 100 %  BP: (103-150)/(61-76) 103/61     Weight: 74.6 kg (164 lb 7.4 oz)  Body mass index is 26.55 kg/m².    Intake/Output Summary (Last 24 hours) at 7/18/2022 1029  Last data filed at 7/18/2022 0830  Gross per 24 hour   Intake 761.39 ml   Output --   Net 761.39 ml        Physical Exam  Vitals and nursing note reviewed.   Constitutional:       General: He is not in acute distress.     Appearance: He is not ill-appearing or toxic-appearing.   HENT:      Head: Normocephalic and atraumatic.      Nose: Nose normal.      Mouth/Throat:      Mouth: Mucous membranes are moist.   Cardiovascular:      Rate and Rhythm: Normal rate and regular rhythm.      Pulses: Normal pulses.      Heart sounds: Normal heart sounds.   Pulmonary:      Effort: Pulmonary effort is normal.      Breath sounds: Normal breath sounds.      Comments: Room air  Abdominal:      General: Bowel sounds are normal. There is no distension.      Palpations: Abdomen is soft.      Tenderness: There is no abdominal tenderness. There is no guarding.   Musculoskeletal:      Cervical back: Normal range of motion.      Right lower leg: No edema.      Left lower leg: No edema.   Skin:     General: Skin is warm and dry.   Neurological:      General: No focal deficit present.      Mental Status: He is  alert and oriented to person, place, and time.   Psychiatric:         Mood and Affect: Mood normal.         Thought Content: Thought content normal.       Significant Labs: All pertinent labs within the past 24 hours have been reviewed.    Significant Imaging: I have reviewed all pertinent imaging results/findings within the past 24 hours.      Assessment/Plan:      * Hyponatremia  Na 118 on admit. Due to alcohol abuse  - calculated serum osm= 286-294.   - Measured serum osm pending and urine osm 194  - TSH, cortisol unremarkable  - not on any Rx that would cause this  - CXR no infiltrate or mass  - liver US no cirrhosis    Started NS. Na overcorrected. Started D5w.   - DC IVF on 07/16  - Na gradually improving, now at 132  - BMP daily   - Discussed alcohol cessation       UTI (urinary tract infection)  UCx EColi  - continue CTX, day 4  - with urinary hesitancy- started flomax    Hyperglycemia  A1c:   Lab Results   Component Value Date    HGBA1C 5.8 (H) 07/15/2022     No history of diabetes   ADA diet, accuchecks ACHS, hypoglycemic protocol    Anemia, chronic disease  Hgb 11 on admission.   - Iron profile and studies c/w Iron deficiency + anemia of chronic disease  - outpatient follow up for screening colonoscopy   - No evidence of bleeding  - Hgb stable     Alcohol abuse  Drinks 3 quarts of beer daily with last drink 7/14/22.  on admit. Denies history of withdrawals. No signs of withdrawal. No cirrhosis on US  - CIWA Q4 monitoring  - PRN ativan for withdrawals  - continue vitamin supplementation       Dysphagia  -Resolved with correction of hypoNa      Tobacco use disorder  Patient was counseled on smoking cessation for 4 minutes. Patient smokes 1ppd. Dr. Guardado discussed how smoking is detrimental to the patient's health. Prescription for nicotine patch was offered. Patient declined.       Leukocytosis  -Patient with hx of alcohol use and tobacco abuse  -Pro calcitonin elevated at 8.43 on admit  -Urine  cx with E.coli  -WBC trending up, now at 21.3. Repeat procal still elevated at 2.8, but improved compared to admit. patient afebrile and non-toxic appearing   -Continue abx  -Monitor closely  -CBC in AM        VTE Risk Mitigation (From admission, onward)         Ordered     Place RADHA hose  Until discontinued         07/15/22 1510     Place sequential compression device  Until discontinued         07/15/22 1510     IP VTE LOW RISK PATIENT  Once         07/15/22 1510                Discharge Planning   BROOKE:      Code Status: Full Code   Is the patient medically ready for discharge?:     Reason for patient still in hospital (select all that apply): Patient trending condition and Treatment  Discharge Plan A: Home                  Emiliano Cyr DO  Department of Hospital Medicine   [facility] - Med Surg

## 2022-07-19 LAB
ANION GAP SERPL CALC-SCNC: 11 MMOL/L (ref 8–16)
BACTERIA BLD CULT: NORMAL
BACTERIA BLD CULT: NORMAL
BASOPHILS # BLD AUTO: 0.06 K/UL (ref 0–0.2)
BASOPHILS NFR BLD: 0.3 % (ref 0–1.9)
BUN SERPL-MCNC: 21 MG/DL (ref 8–23)
CALCIUM SERPL-MCNC: 7.8 MG/DL (ref 8.7–10.5)
CHLORIDE SERPL-SCNC: 98 MMOL/L (ref 95–110)
CO2 SERPL-SCNC: 24 MMOL/L (ref 23–29)
CREAT SERPL-MCNC: 1.1 MG/DL (ref 0.5–1.4)
DIFFERENTIAL METHOD: ABNORMAL
EOSINOPHIL # BLD AUTO: 0.1 K/UL (ref 0–0.5)
EOSINOPHIL NFR BLD: 0.3 % (ref 0–8)
ERYTHROCYTE [DISTWIDTH] IN BLOOD BY AUTOMATED COUNT: 14.6 % (ref 11.5–14.5)
EST. GFR  (AFRICAN AMERICAN): >60 ML/MIN/1.73 M^2
EST. GFR  (NON AFRICAN AMERICAN): >60 ML/MIN/1.73 M^2
GLUCOSE SERPL-MCNC: 106 MG/DL (ref 70–110)
HCT VFR BLD AUTO: 26.2 % (ref 40–54)
HGB BLD-MCNC: 10.1 G/DL (ref 14–18)
IMM GRANULOCYTES # BLD AUTO: 0.94 K/UL (ref 0–0.04)
IMM GRANULOCYTES NFR BLD AUTO: 4.3 % (ref 0–0.5)
LYMPHOCYTES # BLD AUTO: 2 K/UL (ref 1–4.8)
LYMPHOCYTES NFR BLD: 9 % (ref 18–48)
MAGNESIUM SERPL-MCNC: 2.2 MG/DL (ref 1.6–2.6)
MCH RBC QN AUTO: 30.3 PG (ref 27–31)
MCHC RBC AUTO-ENTMCNC: 38.5 G/DL (ref 32–36)
MCV RBC AUTO: 79 FL (ref 82–98)
MONOCYTES # BLD AUTO: 1.6 K/UL (ref 0.3–1)
MONOCYTES NFR BLD: 7.4 % (ref 4–15)
NEUTROPHILS # BLD AUTO: 17 K/UL (ref 1.8–7.7)
NEUTROPHILS NFR BLD: 78.7 % (ref 38–73)
NRBC BLD-RTO: 0 /100 WBC
PLATELET # BLD AUTO: 202 K/UL (ref 150–450)
PMV BLD AUTO: 9.6 FL (ref 9.2–12.9)
POTASSIUM SERPL-SCNC: 3.8 MMOL/L (ref 3.5–5.1)
RBC # BLD AUTO: 3.33 M/UL (ref 4.6–6.2)
SODIUM SERPL-SCNC: 133 MMOL/L (ref 136–145)
WBC # BLD AUTO: 21.63 K/UL (ref 3.9–12.7)

## 2022-07-19 PROCEDURE — 85025 COMPLETE CBC W/AUTO DIFF WBC: CPT | Performed by: STUDENT IN AN ORGANIZED HEALTH CARE EDUCATION/TRAINING PROGRAM

## 2022-07-19 PROCEDURE — 80048 BASIC METABOLIC PNL TOTAL CA: CPT | Performed by: STUDENT IN AN ORGANIZED HEALTH CARE EDUCATION/TRAINING PROGRAM

## 2022-07-19 PROCEDURE — 25000003 PHARM REV CODE 250: Performed by: HOSPITALIST

## 2022-07-19 PROCEDURE — 63600175 PHARM REV CODE 636 W HCPCS: Performed by: HOSPITALIST

## 2022-07-19 PROCEDURE — 83735 ASSAY OF MAGNESIUM: CPT | Performed by: STUDENT IN AN ORGANIZED HEALTH CARE EDUCATION/TRAINING PROGRAM

## 2022-07-19 PROCEDURE — A4216 STERILE WATER/SALINE, 10 ML: HCPCS | Performed by: HOSPITALIST

## 2022-07-19 PROCEDURE — 36415 COLL VENOUS BLD VENIPUNCTURE: CPT | Performed by: STUDENT IN AN ORGANIZED HEALTH CARE EDUCATION/TRAINING PROGRAM

## 2022-07-19 PROCEDURE — 11000001 HC ACUTE MED/SURG PRIVATE ROOM

## 2022-07-19 RX ADMIN — THIAMINE HCL TAB 100 MG 100 MG: 100 TAB at 08:07

## 2022-07-19 RX ADMIN — Medication 10 ML: at 02:07

## 2022-07-19 RX ADMIN — Medication 10 ML: at 05:07

## 2022-07-19 RX ADMIN — FOLIC ACID 1 MG: 1 TABLET ORAL at 08:07

## 2022-07-19 RX ADMIN — CYANOCOBALAMIN TAB 250 MCG 250 MCG: 250 TAB at 08:07

## 2022-07-19 RX ADMIN — CEFTRIAXONE 2 G: 2 INJECTION, SOLUTION INTRAVENOUS at 02:07

## 2022-07-19 RX ADMIN — THERA TABS 1 TABLET: TAB at 08:07

## 2022-07-19 NOTE — PROGRESS NOTES
[facility] Medicine  Progress Note    Patient Name: Paty Jiménez  MRN: [id number]  Patient Class: IP- Inpatient   Admission Date: 7/15/2022  Length of Stay: 4 days  Attending Physician: Hay Montoya MD  Primary Care Provider: To Obtain Unable        Subjective:     Principal Problem:Hyponatremia        HPI:  Mr Paty Jiménez is a 62 y.o. man who presents with weakness. He states he was in his normal state of health until last week. He says he usually runs 2 miles a day and bikes. He has started to feel dizzy- room spinning, not presyncope- with activity. He has nausea and decreased PO intake because he is not able to swallow normally. He cannot say if food gets stuck when swallowing. No pain with swallowing. No abdominal pain. Says that for the last week whenever he urinates he also defecates. He has no headaches, vision changes, numbness. His sister at bedside says he is more confused than usual.     No prior history of this. No long term medical problems. He states that he drinks 3 quarts of beer daily with last drink yesterday. He denies any history of alcohol withdrawal.     In ED, noted to have Na 118 with no recent prior for comparison.       Overview/Hospital Course:  Mr Paty Jiménez is a 62 y.o. man admitted to ICU on 07/15/22 with symptomatic hyponatremia. Started NS. Overcorrected and started D5. Stabilized Na. Now off IVF, continuing diet and monitoring Na. Most likely cause is alcohol use. TSH, cortisol unremarkable. On no Rx at home that may have caused hyponatremia. US abdomen with no cirrhosis. Also with UTI, started CTX and flomax for urinary hesitancy. Urine culture with E.coli. Continue to monitor       Interval History:No new issues     Review of Systems   Constitutional:  Negative for activity change, appetite change and chills.   HENT:  Negative for congestion and dental problem.    Eyes:  Negative for discharge and itching.   Respiratory:  Negative for apnea and chest  tightness.    Cardiovascular:  Negative for chest pain and leg swelling.   Gastrointestinal:  Negative for abdominal distention and abdominal pain.   Endocrine: Negative for cold intolerance.   Genitourinary:  Negative for difficulty urinating.   Musculoskeletal:  Negative for arthralgias and back pain.   Skin:  Negative for color change and pallor.   Neurological:  Negative for light-headedness and numbness.   Psychiatric/Behavioral:  Negative for agitation.    Objective:     Vital Signs (Most Recent):  Temp: 97.9 °F (36.6 °C) (07/19/22 1112)  Pulse: 75 (07/19/22 1112)  Resp: 16 (07/19/22 1112)  BP: 114/60 (07/19/22 1112)  SpO2: 100 % (07/19/22 1112) Vital Signs (24h Range):  Temp:  [97.5 °F (36.4 °C)-98.9 °F (37.2 °C)] 97.9 °F (36.6 °C)  Pulse:  [59-89] 75  Resp:  [16-19] 16  SpO2:  [97 %-100 %] 100 %  BP: (110-142)/(57-81) 114/60     Weight: 74.6 kg (164 lb 7.4 oz)  Body mass index is 26.55 kg/m².    Intake/Output Summary (Last 24 hours) at 7/19/2022 1123  Last data filed at 7/19/2022 0830  Gross per 24 hour   Intake 1009.41 ml   Output --   Net 1009.41 ml      Physical Exam  Vitals and nursing note reviewed.   Constitutional:       General: He is not in acute distress.     Appearance: Normal appearance. He is normal weight. He is not ill-appearing, toxic-appearing or diaphoretic.   HENT:      Head: Normocephalic and atraumatic.   Eyes:      Conjunctiva/sclera: Conjunctivae normal.   Cardiovascular:      Rate and Rhythm: Normal rate and regular rhythm.   Pulmonary:      Effort: Pulmonary effort is normal. No respiratory distress.      Breath sounds: No wheezing.   Abdominal:      General: There is no distension.      Tenderness: There is no abdominal tenderness.   Skin:     Coloration: Skin is not jaundiced.   Neurological:      Mental Status: He is alert and oriented to person, place, and time.   Psychiatric:         Mood and Affect: Mood normal.         Behavior: Behavior normal.         Thought Content:  Thought content normal.       Significant Labs: All pertinent labs within the past 24 hours have been reviewed.  BMP:   Recent Labs   Lab 07/19/22  0525      *   K 3.8   CL 98   CO2 24   BUN 21   CREATININE 1.1   CALCIUM 7.8*   MG 2.2     CBC:   Recent Labs   Lab 07/18/22  0538 07/19/22  0525   WBC 21.33* 21.63*   HGB 9.9* 10.1*   HCT 25.2* 26.2*    202       Significant Imaging:       Assessment/Plan:      * Hyponatremia  Na 118 on admit. Due to alcohol abuse  - calculated serum osm= 286-294.   - Measured serum osm pending and urine osm 194  - TSH, cortisol unremarkable  - not on any Rx that would cause this  - CXR no infiltrate or mass  - liver US no cirrhosis    Started NS. Na overcorrected. Started D5w.   - DC IVF on 07/16  - Na gradually improving, now at 132  - BMP daily   - Discussed alcohol cessation  Now at 133.      Leukocytosis  -Patient with hx of alcohol use and tobacco abuse  -Pro calcitonin elevated at 8.43 on admit  -Urine cx with E.coli  -WBC trending up, now at 21.3. Repeat procal still elevated at 2.8, but improved compared to admit. patient afebrile and non-toxic appearing   -Continue abx  -Monitor closely  -CBC in AM      Hyperglycemia  A1c:   Lab Results   Component Value Date    HGBA1C 5.8 (H) 07/15/2022     No history of diabetes   ADA diet, accuchecks ACHS, hypoglycemic protocol    Dysphagia  -Resolved with correction of hypoNa      Tobacco use disorder  Patient was counseled on smoking cessation for 4 minutes. Patient smokes 1ppd. Dr. Guardado discussed how smoking is detrimental to the patient's health. Prescription for nicotine patch was offered. Patient declined.       Anemia, chronic disease  Hgb 11 on admission.   - Iron profile and studies c/w Iron deficiency + anemia of chronic disease  - outpatient follow up for screening colonoscopy   - No evidence of bleeding  - Hgb stable     UTI (urinary tract infection)  UCx EColi  - continue CTX, day 4  - with urinary  hesitancy- started flomax    WBC count still 21K. Will repeat in am.   Ultrasound no abnormalities     Alcohol abuse  Drinks 3 quarts of beer daily with last drink 7/14/22.  on admit. Denies history of withdrawals. No signs of withdrawal. No cirrhosis on US  - CIWA Q4 monitoring  - PRN ativan for withdrawals  - continue vitamin supplementation         VTE Risk Mitigation (From admission, onward)         Ordered     Place RADHA hose  Until discontinued         07/15/22 1510     Place sequential compression device  Until discontinued         07/15/22 1510     IP VTE LOW RISK PATIENT  Once         07/15/22 1510                Discharge Planning   BROOKE:      Code Status: Full Code   Is the patient medically ready for discharge?:     Reason for patient still in hospital (select all that apply): Patient unstable  Discharge Plan A: Home   Discharge Delays: None known at this time        Continue Abx today. Home likely on 7/20 if WBC count improves       Hay Cleveland MD  Department of Hospital Medicine   [facility] - [place] - Med Surg

## 2022-07-19 NOTE — ASSESSMENT & PLAN NOTE
Na 118 on admit. Due to alcohol abuse  - calculated serum osm= 286-294.   - Measured serum osm pending and urine osm 194  - TSH, cortisol unremarkable  - not on any Rx that would cause this  - CXR no infiltrate or mass  - liver US no cirrhosis    Started NS. Na overcorrected. Started D5w.   - DC IVF on 07/16  - Na gradually improving, now at 132  - BMP daily   - Discussed alcohol cessation  Now at 133.

## 2022-07-19 NOTE — SUBJECTIVE & OBJECTIVE
Interval History:No new issues     Review of Systems   Constitutional:  Negative for activity change, appetite change and chills.   HENT:  Negative for congestion and dental problem.    Eyes:  Negative for discharge and itching.   Respiratory:  Negative for apnea and chest tightness.    Cardiovascular:  Negative for chest pain and leg swelling.   Gastrointestinal:  Negative for abdominal distention and abdominal pain.   Endocrine: Negative for cold intolerance.   Genitourinary:  Negative for difficulty urinating.   Musculoskeletal:  Negative for arthralgias and back pain.   Skin:  Negative for color change and pallor.   Neurological:  Negative for light-headedness and numbness.   Psychiatric/Behavioral:  Negative for agitation.    Objective:     Vital Signs (Most Recent):  Temp: 97.9 °F (36.6 °C) (07/19/22 1112)  Pulse: 75 (07/19/22 1112)  Resp: 16 (07/19/22 1112)  BP: 114/60 (07/19/22 1112)  SpO2: 100 % (07/19/22 1112) Vital Signs (24h Range):  Temp:  [97.5 °F (36.4 °C)-98.9 °F (37.2 °C)] 97.9 °F (36.6 °C)  Pulse:  [59-89] 75  Resp:  [16-19] 16  SpO2:  [97 %-100 %] 100 %  BP: (110-142)/(57-81) 114/60     Weight: 74.6 kg (164 lb 7.4 oz)  Body mass index is 26.55 kg/m².    Intake/Output Summary (Last 24 hours) at 7/19/2022 1123  Last data filed at 7/19/2022 0830  Gross per 24 hour   Intake 1009.41 ml   Output --   Net 1009.41 ml      Physical Exam  Vitals and nursing note reviewed.   Constitutional:       General: He is not in acute distress.     Appearance: Normal appearance. He is normal weight. He is not ill-appearing, toxic-appearing or diaphoretic.   HENT:      Head: Normocephalic and atraumatic.   Eyes:      Conjunctiva/sclera: Conjunctivae normal.   Cardiovascular:      Rate and Rhythm: Normal rate and regular rhythm.   Pulmonary:      Effort: Pulmonary effort is normal. No respiratory distress.      Breath sounds: No wheezing.   Abdominal:      General: There is no distension.      Tenderness: There is no  abdominal tenderness.   Skin:     Coloration: Skin is not jaundiced.   Neurological:      Mental Status: He is alert and oriented to person, place, and time.   Psychiatric:         Mood and Affect: Mood normal.         Behavior: Behavior normal.         Thought Content: Thought content normal.       Significant Labs: All pertinent labs within the past 24 hours have been reviewed.  BMP:   Recent Labs   Lab 07/19/22  0525      *   K 3.8   CL 98   CO2 24   BUN 21   CREATININE 1.1   CALCIUM 7.8*   MG 2.2     CBC:   Recent Labs   Lab 07/18/22  0538 07/19/22  0525   WBC 21.33* 21.63*   HGB 9.9* 10.1*   HCT 25.2* 26.2*    202       Significant Imaging:

## 2022-07-19 NOTE — ASSESSMENT & PLAN NOTE
UCx EColi  - continue CTX, day 4  - with urinary hesitancy- started flomax    WBC count still 21K. Will repeat in am.   Ultrasound no abnormalities

## 2022-07-19 NOTE — PLAN OF CARE
Problem: Fall Injury Risk  Goal: Absence of Fall and Fall-Related Injury  Intervention: Promote Injury-Free Environment  Flowsheets (Taken 7/19/2022 1355)  Safety Promotion/Fall Prevention:   assistive device/personal item within reach   Fall Risk reviewed with patient/family   Fall Risk signage in place   lighting adjusted   nonskid shoes/socks when out of bed   side rails raised x 2     Problem: Fall Injury Risk  Goal: Absence of Fall and Fall-Related Injury  Intervention: Identify and Manage Contributors  Flowsheets (Taken 7/19/2022 1355)  Self-Care Promotion:   independence encouraged   BADL personal objects within reach  Medication Review/Management: medications reviewed     Problem: Infection  Goal: Absence of Infection Signs and Symptoms  Intervention: Prevent or Manage Infection  Flowsheets (Taken 7/19/2022 1355)  Fever Reduction/Comfort Measures: fluid intake increased

## 2022-07-19 NOTE — CONSULTS
[facility]  Hospital Medicine  Telemedicine Consult Note         Virtual [facility] Medicine consulted for Paty Jiménez to be followed through telemedicine modalities.  The patient is currently not appropriate for virtual visits as pt is likely to DC tomorrow given improvement in medical condition. Will keep on current team for continuity of care.   Please re-consult once the patient is appropriate for virtual visits.      Crista Kendall MD  Hospital Medicine Staff

## 2022-07-20 LAB
ANION GAP SERPL CALC-SCNC: 10 MMOL/L (ref 8–16)
BASOPHILS # BLD AUTO: 0.03 K/UL (ref 0–0.2)
BASOPHILS NFR BLD: 0.2 % (ref 0–1.9)
BUN SERPL-MCNC: 19 MG/DL (ref 8–23)
CALCIUM SERPL-MCNC: 7.9 MG/DL (ref 8.7–10.5)
CHLORIDE SERPL-SCNC: 99 MMOL/L (ref 95–110)
CO2 SERPL-SCNC: 24 MMOL/L (ref 23–29)
CREAT SERPL-MCNC: 1.1 MG/DL (ref 0.5–1.4)
DIFFERENTIAL METHOD: ABNORMAL
EOSINOPHIL # BLD AUTO: 0.1 K/UL (ref 0–0.5)
EOSINOPHIL NFR BLD: 0.3 % (ref 0–8)
ERYTHROCYTE [DISTWIDTH] IN BLOOD BY AUTOMATED COUNT: 16.2 % (ref 11.5–14.5)
EST. GFR  (AFRICAN AMERICAN): >60 ML/MIN/1.73 M^2
EST. GFR  (NON AFRICAN AMERICAN): >60 ML/MIN/1.73 M^2
GLUCOSE SERPL-MCNC: 97 MG/DL (ref 70–110)
HCT VFR BLD AUTO: 26.8 % (ref 40–54)
HGB BLD-MCNC: 9.7 G/DL (ref 14–18)
IMM GRANULOCYTES # BLD AUTO: 0.71 K/UL (ref 0–0.04)
IMM GRANULOCYTES NFR BLD AUTO: 3.6 % (ref 0–0.5)
LYMPHOCYTES # BLD AUTO: 2 K/UL (ref 1–4.8)
LYMPHOCYTES NFR BLD: 10.3 % (ref 18–48)
MCH RBC QN AUTO: 30.1 PG (ref 27–31)
MCHC RBC AUTO-ENTMCNC: 36.2 G/DL (ref 32–36)
MCV RBC AUTO: 83 FL (ref 82–98)
MONOCYTES # BLD AUTO: 1.1 K/UL (ref 0.3–1)
MONOCYTES NFR BLD: 5.6 % (ref 4–15)
NEUTROPHILS # BLD AUTO: 15.7 K/UL (ref 1.8–7.7)
NEUTROPHILS NFR BLD: 80 % (ref 38–73)
NRBC BLD-RTO: 0 /100 WBC
PLATELET # BLD AUTO: 222 K/UL (ref 150–450)
PMV BLD AUTO: 9.4 FL (ref 9.2–12.9)
POTASSIUM SERPL-SCNC: 4.3 MMOL/L (ref 3.5–5.1)
RBC # BLD AUTO: 3.22 M/UL (ref 4.6–6.2)
SODIUM SERPL-SCNC: 133 MMOL/L (ref 136–145)
WBC # BLD AUTO: 19.57 K/UL (ref 3.9–12.7)

## 2022-07-20 PROCEDURE — 36415 COLL VENOUS BLD VENIPUNCTURE: CPT | Performed by: STUDENT IN AN ORGANIZED HEALTH CARE EDUCATION/TRAINING PROGRAM

## 2022-07-20 PROCEDURE — 36415 COLL VENOUS BLD VENIPUNCTURE: CPT | Performed by: INTERNAL MEDICINE

## 2022-07-20 PROCEDURE — 25000003 PHARM REV CODE 250: Performed by: HOSPITALIST

## 2022-07-20 PROCEDURE — 11000001 HC ACUTE MED/SURG PRIVATE ROOM

## 2022-07-20 PROCEDURE — 85025 COMPLETE CBC W/AUTO DIFF WBC: CPT | Performed by: INTERNAL MEDICINE

## 2022-07-20 PROCEDURE — 99223 1ST HOSP IP/OBS HIGH 75: CPT | Mod: ,,, | Performed by: INTERNAL MEDICINE

## 2022-07-20 PROCEDURE — A4216 STERILE WATER/SALINE, 10 ML: HCPCS | Performed by: HOSPITALIST

## 2022-07-20 PROCEDURE — 80048 BASIC METABOLIC PNL TOTAL CA: CPT | Performed by: STUDENT IN AN ORGANIZED HEALTH CARE EDUCATION/TRAINING PROGRAM

## 2022-07-20 PROCEDURE — 63600175 PHARM REV CODE 636 W HCPCS: Performed by: HOSPITALIST

## 2022-07-20 PROCEDURE — 99223 PR INITIAL HOSPITAL CARE,LEVL III: ICD-10-PCS | Mod: ,,, | Performed by: INTERNAL MEDICINE

## 2022-07-20 RX ADMIN — THIAMINE HCL TAB 100 MG 100 MG: 100 TAB at 08:07

## 2022-07-20 RX ADMIN — THERA TABS 1 TABLET: TAB at 08:07

## 2022-07-20 RX ADMIN — Medication 10 ML: at 05:07

## 2022-07-20 RX ADMIN — CEFTRIAXONE 2 G: 2 INJECTION, SOLUTION INTRAVENOUS at 02:07

## 2022-07-20 RX ADMIN — FOLIC ACID 1 MG: 1 TABLET ORAL at 08:07

## 2022-07-20 RX ADMIN — Medication 10 ML: at 02:07

## 2022-07-20 RX ADMIN — Medication 10 ML: at 10:07

## 2022-07-20 RX ADMIN — CYANOCOBALAMIN TAB 250 MCG 250 MCG: 250 TAB at 08:07

## 2022-07-20 NOTE — ASSESSMENT & PLAN NOTE
UCx EColi  - continue CTX, day 4  - with urinary hesitancy- started flomax    WBC count still 21K. Will repeat in am.   Ultrasound no abnormalities     CBC pending this am. If trending down WBC count- will dc to home. If not- ID consult

## 2022-07-20 NOTE — ASSESSMENT & PLAN NOTE
"62M with h/o daily etoh and tobacco and dental caries and fhx of colon cancer admitted 7/15 with generalized weakness/confusion. Denies urinary symptoms. UA with >100wbc and e.coli in culture. procal 8 --> 3 with ceftriaxone, day #6. persistent wbc elevated and anemia. ID consulted for "stubborn WBC count elevation.  thanks."    Unclear that leukocytosis related to infection. idsa guidelines don't recommend treating asymptomatic bacteruria, but it would have incidentally been treated with the ceftriaxone. A possible diverticulitis has also incidentally been treated. bcx neg and no peripheral stigmata of endocarditis. I worry that he may have a non-infectious process going on that could be contributing to anemia/leukocytosis    Recommendations:   - stop antibiotics and monitor  - consider hematology eval  - needs screening colonoscopy outpatient  - please arrange outpatient dental f/u and PCP  - counseled on need for tobacco and etoh cessation  - needs routine hiv and hep c ab screening per cdc guideliens        "

## 2022-07-20 NOTE — CONSULTS
"[facility] - [facility] Surg  Infectious Disease  Consult Note    Patient Name: Paty Jiménez  MRN: [id number]  Admission Date: 7/15/2022  Hospital Length of Stay: 5 days  Attending Physician: Hay Montoya MD  Primary Care Provider: To Obtain Unable     Isolation Status: No active isolations    Patient information was obtained from patient and ER records.      Consults  Assessment/Plan:     Leukocytosis  62M with h/o daily etoh and tobacco and dental caries and fhx of colon cancer admitted 7/15 with generalized weakness/confusion. Denies urinary symptoms. UA with >100wbc and e.coli in culture. procal 8 --> 3 with ceftriaxone, day #6. persistent wbc elevated and anemia. ID consulted for "stubborn WBC count elevation.  thanks."    Unclear that leukocytosis related to infection. idsa guidelines don't recommend treating asymptomatic bacteruria, but it would have incidentally been treated with the ceftriaxone. A possible diverticulitis has also incidentally been treated. bcx neg and no peripheral stigmata of endocarditis. I worry that he may have a non-infectious process going on that could be contributing to anemia/leukocytosis    Recommendations:   - stop antibiotics and monitor  - consider hematology eval  - needs screening colonoscopy outpatient  - please arrange outpatient dental f/u and PCP  - counseled on need for tobacco and etoh cessation  - needs routine hiv and hep c ab screening per cdc guideliens              Thank you for your consult. I will follow-up with patient. Please contact us if you have any additional questions.    Liv Crowell MD  Infectious Disease  [facility] - Med Surg    Subjective:     Principal Problem: Hyponatremia    HPI:   62M with h/o daily etoh use admitted 7/15 with generalized weakness. Primarily came in for generally not feeling well with some dizziness and nausea and some confusion. Denies any urinary freq, urgency, or cva tenderness. Reports feeling better. Doesn't have pcp. Fhx sig " "for mom that had colon cancer in 50s. Denies ever having screening colonoscopy.       Procalcitonin <0.25 ng/mL 8.43 High     Improved to 3 with abx    Bcx neg    UA >100 wbc, no squam  Component 5 d ago    Urine Culture, Routine  Abnormal   ESCHERICHIA COLI   >100,000 cfu/ml     Resulting Agency [facility]          Susceptibility     Escherichia coli     CULTURE, URINE     Amox/K Clav'ate 16/8 mcg/mL Intermediate     Amp/Sulbactam >16/8 mcg/mL Resistant     Ampicillin >16 mcg/mL Resistant     Cefazolin <=2 mcg/mL Sensitive     Cefepime <=2 mcg/mL Sensitive     Ceftriaxone <=1 mcg/mL Sensitive     Ciprofloxacin <=1 mcg/mL Sensitive     Ertapenem <=0.5 mcg/mL Sensitive     Gentamicin <=4 mcg/mL Sensitive     Levofloxacin <=2 mcg/mL Sensitive     Meropenem <=1 mcg/mL Sensitive     Minocycline <=4 mcg/mL Sensitive     Nitrofurantoin <=32 mcg/mL Sensitive     Piperacillin/Tazo <=16 mcg/mL Sensitive     Tobramycin <=4 mcg/mL Sensitive     Trimeth/Sulfa <=2/38 mcg/mL Sensitive                 Component Ref Range & Units 5 d ago 2 yr ago   Alcohol, Serum <10 mg/dL 135 High   276 High          Abd ultrasound    No acute abnormalities identified.  Nonspecific mild gallbladder wall thickening.    Day #6 ceftriaxone. Wbc persistently elevated around 20k    Fever resolved      ID consulted for "stubborn WBC count elevation.  thanks."      History reviewed. No pertinent past medical history.    History reviewed. No pertinent surgical history.    Review of patient's allergies indicates:  No Known Allergies    No current facility-administered medications on file prior to encounter.     No current outpatient medications on file prior to encounter.     Family History       Problem Relation (Age of Onset)    Cancer Mother, Father          Tobacco Use    Smoking status: Current Every Day Smoker     Types: Cigarettes    Smokeless tobacco: Not on file   Substance and Sexual Activity    Alcohol use: Yes     Comment: 3 quarts of beer daily "    Drug use: Not Currently    Sexual activity: Not on file     Review of Systems   Constitutional:  Positive for activity change, appetite change and fatigue. Negative for chills and fever.   HENT:  Positive for trouble swallowing. Negative for congestion and sore throat.    Respiratory:  Positive for cough. Negative for chest tightness, shortness of breath and wheezing.    Cardiovascular:  Negative for chest pain, palpitations and leg swelling.   Gastrointestinal:  Negative for abdominal distention, abdominal pain, constipation, diarrhea, nausea and vomiting.   Genitourinary:  Positive for dysuria. Negative for difficulty urinating.   Musculoskeletal:  Negative for arthralgias and myalgias.   Skin:  Negative for rash and wound.   Neurological:  Positive for dizziness and weakness. Negative for tremors, syncope, speech difficulty, light-headedness, numbness and headaches.   Psychiatric/Behavioral:  Positive for confusion.    Objective:     Vital Signs (Most Recent):  Temp: 97.5 °F (36.4 °C) (07/20/22 1155)  Pulse: 60 (07/20/22 1155)  Resp: 18 (07/20/22 1155)  BP: (!) 155/71 (07/20/22 1155)  SpO2: 100 % (07/20/22 1155) Vital Signs (24h Range):  Temp:  [97.5 °F (36.4 °C)-98.8 °F (37.1 °C)] 97.5 °F (36.4 °C)  Pulse:  [52-75] 60  Resp:  [16-18] 18  SpO2:  [100 %] 100 %  BP: (136-155)/(65-71) 155/71     Weight: 74.6 kg (164 lb 7.4 oz)  Body mass index is 26.55 kg/m².    Physical Exam  Vitals and nursing note reviewed.   Constitutional:       General: He is not in acute distress.     Appearance: He is not ill-appearing or toxic-appearing.   HENT:      Head: Normocephalic and atraumatic.      Nose: Nose normal.      Mouth/Throat:      Mouth: Mucous membranes are dry.      Comments: Poor dentition  Eyes:      General: No scleral icterus.     Extraocular Movements: Extraocular movements intact.      Conjunctiva/sclera: Conjunctivae normal.   Cardiovascular:      Rate and Rhythm: Normal rate and regular rhythm.      Pulses:  Normal pulses.      Heart sounds: Normal heart sounds. No murmur heard.    No gallop.   Pulmonary:      Effort: Pulmonary effort is normal. No respiratory distress.      Breath sounds: Normal breath sounds. No wheezing or rales.      Comments: Room air  Abdominal:      General: Bowel sounds are normal. There is no distension.      Palpations: Abdomen is soft. There is no mass.      Tenderness: There is no abdominal tenderness. There is no guarding.   Musculoskeletal:      Right lower leg: No edema.      Left lower leg: No edema.   Lymphadenopathy:      Cervical: No cervical adenopathy.   Skin:     General: Skin is warm and dry.   Neurological:      Mental Status: He is alert and oriented to person, place, and time.           Significant Labs: All pertinent labs within the past 24 hours have been reviewed.    Significant Imaging: I have reviewed all pertinent imaging results/findings within the past 24 hours.

## 2022-07-20 NOTE — HPI
"62M with h/o daily etoh use admitted 7/15 with generalized weakness. Primarily came in for generally not feeling well with some dizziness and nausea and some confusion. Denies any urinary freq, urgency, or cva tenderness. Reports feeling better. Doesn't have pcp. Fhx sig for mom that had colon cancer in 50s. Denies ever having screening colonoscopy.       Procalcitonin <0.25 ng/mL 8.43 High     Improved to 3 with abx    Bcx neg    UA >100 wbc, no squam  Component 5 d ago    Urine Culture, Routine  Abnormal   ESCHERICHIA COLI   >100,000 cfu/ml     Resulting Agency [facility]          Susceptibility     Escherichia coli     CULTURE, URINE     Amox/K Clav'ate 16/8 mcg/mL Intermediate     Amp/Sulbactam >16/8 mcg/mL Resistant     Ampicillin >16 mcg/mL Resistant     Cefazolin <=2 mcg/mL Sensitive     Cefepime <=2 mcg/mL Sensitive     Ceftriaxone <=1 mcg/mL Sensitive     Ciprofloxacin <=1 mcg/mL Sensitive     Ertapenem <=0.5 mcg/mL Sensitive     Gentamicin <=4 mcg/mL Sensitive     Levofloxacin <=2 mcg/mL Sensitive     Meropenem <=1 mcg/mL Sensitive     Minocycline <=4 mcg/mL Sensitive     Nitrofurantoin <=32 mcg/mL Sensitive     Piperacillin/Tazo <=16 mcg/mL Sensitive     Tobramycin <=4 mcg/mL Sensitive     Trimeth/Sulfa <=2/38 mcg/mL Sensitive                 Component Ref Range & Units 5 d ago 2 yr ago   Alcohol, Serum <10 mg/dL 135 High   276 High          Abd ultrasound    No acute abnormalities identified.  Nonspecific mild gallbladder wall thickening.    Day #6 ceftriaxone. Wbc persistently elevated around 20k    Fever resolved      ID consulted for "stubborn WBC count elevation.  thanks."  "

## 2022-07-20 NOTE — SUBJECTIVE & OBJECTIVE
Interval History: No new issues     Review of Systems   Constitutional:  Negative for activity change, appetite change and chills.   HENT:  Negative for congestion and dental problem.    Eyes:  Negative for discharge and itching.   Respiratory:  Negative for apnea and chest tightness.    Cardiovascular:  Negative for chest pain and leg swelling.   Gastrointestinal:  Negative for abdominal distention and abdominal pain.   Endocrine: Negative for cold intolerance.   Genitourinary:  Negative for difficulty urinating.   Musculoskeletal:  Negative for arthralgias and back pain.   Skin:  Negative for color change and pallor.   Neurological:  Negative for light-headedness and numbness.   Psychiatric/Behavioral:  Negative for agitation.    Objective:     Vital Signs (Most Recent):  Temp: 98.2 °F (36.8 °C) (07/20/22 0719)  Pulse: (!) 52 (07/20/22 0719)  Resp: 18 (07/20/22 0719)  BP: (!) 143/67 (07/20/22 0719)  SpO2: 100 % (07/20/22 0719)   Vital Signs (24h Range):  Temp:  [97.9 °F (36.6 °C)-98.8 °F (37.1 °C)] 98.2 °F (36.8 °C)  Pulse:  [52-75] 52  Resp:  [16-18] 18  SpO2:  [100 %] 100 %  BP: (114-145)/(60-70) 143/67     Weight: 74.6 kg (164 lb 7.4 oz)  Body mass index is 26.55 kg/m².    Intake/Output Summary (Last 24 hours) at 7/20/2022 0849  Last data filed at 7/20/2022 0554  Gross per 24 hour   Intake 970 ml   Output 350 ml   Net 620 ml      Physical Exam  Vitals and nursing note reviewed.   Constitutional:       General: He is not in acute distress.     Appearance: Normal appearance. He is normal weight. He is not ill-appearing, toxic-appearing or diaphoretic.   HENT:      Head: Normocephalic and atraumatic.   Eyes:      Conjunctiva/sclera: Conjunctivae normal.   Cardiovascular:      Rate and Rhythm: Normal rate and regular rhythm.   Pulmonary:      Effort: Pulmonary effort is normal. No respiratory distress.      Breath sounds: No wheezing.   Abdominal:      General: There is no distension.      Tenderness: There is no  abdominal tenderness.   Skin:     Coloration: Skin is not jaundiced.   Neurological:      Mental Status: He is alert and oriented to person, place, and time.   Psychiatric:         Mood and Affect: Mood normal.         Behavior: Behavior normal.         Thought Content: Thought content normal.       Significant Labs: All pertinent labs within the past 24 hours have been reviewed.  BMP:   Recent Labs   Lab 07/19/22  0525 07/20/22  0348    97   * 133*   K 3.8 4.3   CL 98 99   CO2 24 24   BUN 21 19   CREATININE 1.1 1.1   CALCIUM 7.8* 7.9*   MG 2.2  --      CBC:   Recent Labs   Lab 07/19/22  0525   WBC 21.63*   HGB 10.1*   HCT 26.2*          Significant Imaging:

## 2022-07-20 NOTE — SUBJECTIVE & OBJECTIVE
History reviewed. No pertinent past medical history.    History reviewed. No pertinent surgical history.    Review of patient's allergies indicates:  No Known Allergies    No current facility-administered medications on file prior to encounter.     No current outpatient medications on file prior to encounter.     Family History       Problem Relation (Age of Onset)    Cancer Mother, Father          Tobacco Use    Smoking status: Current Every Day Smoker     Types: Cigarettes    Smokeless tobacco: Not on file   Substance and Sexual Activity    Alcohol use: Yes     Comment: 3 quarts of beer daily    Drug use: Not Currently    Sexual activity: Not on file     Review of Systems   Constitutional:  Positive for activity change, appetite change and fatigue. Negative for chills and fever.   HENT:  Positive for trouble swallowing. Negative for congestion and sore throat.    Respiratory:  Positive for cough. Negative for chest tightness, shortness of breath and wheezing.    Cardiovascular:  Negative for chest pain, palpitations and leg swelling.   Gastrointestinal:  Negative for abdominal distention, abdominal pain, constipation, diarrhea, nausea and vomiting.   Genitourinary:  Positive for dysuria. Negative for difficulty urinating.   Musculoskeletal:  Negative for arthralgias and myalgias.   Skin:  Negative for rash and wound.   Neurological:  Positive for dizziness and weakness. Negative for tremors, syncope, speech difficulty, light-headedness, numbness and headaches.   Psychiatric/Behavioral:  Positive for confusion.    Objective:     Vital Signs (Most Recent):  Temp: 97.5 °F (36.4 °C) (07/20/22 1155)  Pulse: 60 (07/20/22 1155)  Resp: 18 (07/20/22 1155)  BP: (!) 155/71 (07/20/22 1155)  SpO2: 100 % (07/20/22 1155) Vital Signs (24h Range):  Temp:  [97.5 °F (36.4 °C)-98.8 °F (37.1 °C)] 97.5 °F (36.4 °C)  Pulse:  [52-75] 60  Resp:  [16-18] 18  SpO2:  [100 %] 100 %  BP: (136-155)/(65-71) 155/71     Weight: 74.6 kg (164  lb 7.4 oz)  Body mass index is 26.55 kg/m².    Physical Exam  Vitals and nursing note reviewed.   Constitutional:       General: He is not in acute distress.     Appearance: He is not ill-appearing or toxic-appearing.   HENT:      Head: Normocephalic and atraumatic.      Nose: Nose normal.      Mouth/Throat:      Mouth: Mucous membranes are dry.      Comments: Poor dentition  Eyes:      General: No scleral icterus.     Extraocular Movements: Extraocular movements intact.      Conjunctiva/sclera: Conjunctivae normal.   Cardiovascular:      Rate and Rhythm: Normal rate and regular rhythm.      Pulses: Normal pulses.      Heart sounds: Normal heart sounds. No murmur heard.    No gallop.   Pulmonary:      Effort: Pulmonary effort is normal. No respiratory distress.      Breath sounds: Normal breath sounds. No wheezing or rales.      Comments: Room air  Abdominal:      General: Bowel sounds are normal. There is no distension.      Palpations: Abdomen is soft. There is no mass.      Tenderness: There is no abdominal tenderness. There is no guarding.   Musculoskeletal:      Right lower leg: No edema.      Left lower leg: No edema.   Lymphadenopathy:      Cervical: No cervical adenopathy.   Skin:     General: Skin is warm and dry.   Neurological:      Mental Status: He is alert and oriented to person, place, and time.           Significant Labs: All pertinent labs within the past 24 hours have been reviewed.    Significant Imaging: I have reviewed all pertinent imaging results/findings within the past 24 hours.

## 2022-07-20 NOTE — PROGRESS NOTES
[facility] Medicine  Progress Note    Patient Name: Paty Jiménez  MRN: [id number]  Patient Class: IP- Inpatient   Admission Date: 7/15/2022  Length of Stay: 5 days  Attending Physician: Hay Montoya MD  Primary Care Provider: To Obtain Unable        Subjective:     Principal Problem:Hyponatremia        HPI:  Mr Paty Jiménez is a 62 y.o. man who presents with weakness. He states he was in his normal state of health until last week. He says he usually runs 2 miles a day and bikes. He has started to feel dizzy- room spinning, not presyncope- with activity. He has nausea and decreased PO intake because he is not able to swallow normally. He cannot say if food gets stuck when swallowing. No pain with swallowing. No abdominal pain. Says that for the last week whenever he urinates he also defecates. He has no headaches, vision changes, numbness. His sister at bedside says he is more confused than usual.     No prior history of this. No long term medical problems. He states that he drinks 3 quarts of beer daily with last drink yesterday. He denies any history of alcohol withdrawal.     In ED, noted to have Na 118 with no recent prior for comparison.       Overview/Hospital Course:  Mr Paty Jiménez is a 62 y.o. man admitted to ICU on 07/15/22 with symptomatic hyponatremia. Started NS. Overcorrected and started D5. Stabilized Na. Now off IVF, continuing diet and monitoring Na. Most likely cause is alcohol use. TSH, cortisol unremarkable. On no Rx at home that may have caused hyponatremia. US abdomen with no cirrhosis. Also with UTI, started CTX and flomax for urinary hesitancy. Urine culture with E.coli. Continue to monitor       Interval History: No new issues     Review of Systems   Constitutional:  Negative for activity change, appetite change and chills.   HENT:  Negative for congestion and dental problem.    Eyes:  Negative for discharge and itching.   Respiratory:  Negative for apnea and  chest tightness.    Cardiovascular:  Negative for chest pain and leg swelling.   Gastrointestinal:  Negative for abdominal distention and abdominal pain.   Endocrine: Negative for cold intolerance.   Genitourinary:  Negative for difficulty urinating.   Musculoskeletal:  Negative for arthralgias and back pain.   Skin:  Negative for color change and pallor.   Neurological:  Negative for light-headedness and numbness.   Psychiatric/Behavioral:  Negative for agitation.    Objective:     Vital Signs (Most Recent):  Temp: 98.2 °F (36.8 °C) (07/20/22 0719)  Pulse: (!) 52 (07/20/22 0719)  Resp: 18 (07/20/22 0719)  BP: (!) 143/67 (07/20/22 0719)  SpO2: 100 % (07/20/22 0719)   Vital Signs (24h Range):  Temp:  [97.9 °F (36.6 °C)-98.8 °F (37.1 °C)] 98.2 °F (36.8 °C)  Pulse:  [52-75] 52  Resp:  [16-18] 18  SpO2:  [100 %] 100 %  BP: (114-145)/(60-70) 143/67     Weight: 74.6 kg (164 lb 7.4 oz)  Body mass index is 26.55 kg/m².    Intake/Output Summary (Last 24 hours) at 7/20/2022 0849  Last data filed at 7/20/2022 0554  Gross per 24 hour   Intake 970 ml   Output 350 ml   Net 620 ml      Physical Exam  Vitals and nursing note reviewed.   Constitutional:       General: He is not in acute distress.     Appearance: Normal appearance. He is normal weight. He is not ill-appearing, toxic-appearing or diaphoretic.   HENT:      Head: Normocephalic and atraumatic.   Eyes:      Conjunctiva/sclera: Conjunctivae normal.   Cardiovascular:      Rate and Rhythm: Normal rate and regular rhythm.   Pulmonary:      Effort: Pulmonary effort is normal. No respiratory distress.      Breath sounds: No wheezing.   Abdominal:      General: There is no distension.      Tenderness: There is no abdominal tenderness.   Skin:     Coloration: Skin is not jaundiced.   Neurological:      Mental Status: He is alert and oriented to person, place, and time.   Psychiatric:         Mood and Affect: Mood normal.         Behavior: Behavior normal.         Thought Content:  Thought content normal.       Significant Labs: All pertinent labs within the past 24 hours have been reviewed.  BMP:   Recent Labs   Lab 07/19/22  0525 07/20/22  0348    97   * 133*   K 3.8 4.3   CL 98 99   CO2 24 24   BUN 21 19   CREATININE 1.1 1.1   CALCIUM 7.8* 7.9*   MG 2.2  --      CBC:   Recent Labs   Lab 07/19/22  0525   WBC 21.63*   HGB 10.1*   HCT 26.2*          Significant Imaging:       Assessment/Plan:      * Hyponatremia  Na 118 on admit. Due to alcohol abuse  - calculated serum osm= 286-294.   - Measured serum osm pending and urine osm 194  - TSH, cortisol unremarkable  - not on any Rx that would cause this  - CXR no infiltrate or mass  - liver US no cirrhosis    Started NS. Na overcorrected. Started D5w.   - DC IVF on 07/16  - Na gradually improving, now at 132  - BMP daily   - Discussed alcohol cessation  Now at 133.      Leukocytosis  -Patient with hx of alcohol use and tobacco abuse  -Pro calcitonin elevated at 8.43 on admit  -Urine cx with E.coli  -WBC trending up, now at 21.3. Repeat procal still elevated at 2.8, but improved compared to admit. patient afebrile and non-toxic appearing   -Continue abx  -Monitor closely  -CBC in AM      Hyperglycemia  A1c:   Lab Results   Component Value Date    HGBA1C 5.8 (H) 07/15/2022     No history of diabetes   ADA diet, accuchecks ACHS, hypoglycemic protocol    Dysphagia  -Resolved with correction of hypoNa      Tobacco use disorder  Patient was counseled on smoking cessation for 4 minutes. Patient smokes 1ppd. Dr. Guardado discussed how smoking is detrimental to the patient's health. Prescription for nicotine patch was offered. Patient declined.       Anemia, chronic disease  Hgb 11 on admission.   - Iron profile and studies c/w Iron deficiency + anemia of chronic disease  - outpatient follow up for screening colonoscopy   - No evidence of bleeding  - Hgb stable     UTI (urinary tract infection)  UCx EColi  - continue CTX, day 4  -  with urinary hesitancy- started flomax    WBC count still 21K. Will repeat in am.   Ultrasound no abnormalities     CBC pending this am. If trending down WBC count- will dc to home. If not- ID consult       Alcohol abuse  Drinks 3 quarts of beer daily with last drink 7/14/22.  on admit. Denies history of withdrawals. No signs of withdrawal. No cirrhosis on US  - CIWA Q4 monitoring  - PRN ativan for withdrawals  - continue vitamin supplementation         VTE Risk Mitigation (From admission, onward)         Ordered     Place RADHA hose  Until discontinued         07/15/22 1510     Place sequential compression device  Until discontinued         07/15/22 1510     IP VTE LOW RISK PATIENT  Once         07/15/22 1510                Discharge Planning   BROOKE:      Code Status: Full Code   Is the patient medically ready for discharge?:     Reason for patient still in hospital (select all that apply): Patient unstable  Discharge Plan A: Home   Discharge Delays: None known at this time          CBC pending. Likely discharge to home later.       Hay Cleveland MD  Department of Hospital Medicine   [facility] [place]

## 2022-07-20 NOTE — CONSULTS
[facility]  Infectious Disease  Consult Note    Patient Name: Paty Jiménez  MRN: [id number]  Admission Date: 7/15/2022  Hospital Length of Stay: 5 days  Attending Physician: Hay Montoya MD  Primary Care Provider: To Obtain Unable     Isolation Status: No active isolations        Inpatient consult to Infectious Diseases  Consult performed by: Liv Crowell MD  Consult ordered by: Hay Montoya MD          See consult note from 7/20

## 2022-07-21 ENCOUNTER — TELEPHONE (OUTPATIENT)
Dept: HEMATOLOGY/ONCOLOGY | Facility: CLINIC | Age: 62
End: 2022-07-21
Payer: MEDICAID

## 2022-07-21 VITALS
TEMPERATURE: 98 F | HEART RATE: 61 BPM | WEIGHT: 164.44 LBS | OXYGEN SATURATION: 100 % | BODY MASS INDEX: 26.43 KG/M2 | DIASTOLIC BLOOD PRESSURE: 74 MMHG | SYSTOLIC BLOOD PRESSURE: 132 MMHG | HEIGHT: 66 IN | RESPIRATION RATE: 18 BRPM

## 2022-07-21 PROBLEM — N39.0 UTI (URINARY TRACT INFECTION): Status: RESOLVED | Noted: 2022-07-15 | Resolved: 2022-07-21

## 2022-07-21 PROBLEM — R13.10 DYSPHAGIA: Status: RESOLVED | Noted: 2022-07-15 | Resolved: 2022-07-21

## 2022-07-21 PROBLEM — E87.1 HYPONATREMIA: Status: RESOLVED | Noted: 2022-07-15 | Resolved: 2022-07-21

## 2022-07-21 PROCEDURE — A4216 STERILE WATER/SALINE, 10 ML: HCPCS | Performed by: HOSPITALIST

## 2022-07-21 PROCEDURE — 25000003 PHARM REV CODE 250: Performed by: HOSPITALIST

## 2022-07-21 RX ADMIN — Medication 10 ML: at 06:07

## 2022-07-21 RX ADMIN — FOLIC ACID 1 MG: 1 TABLET ORAL at 09:07

## 2022-07-21 RX ADMIN — CYANOCOBALAMIN TAB 250 MCG 250 MCG: 250 TAB at 09:07

## 2022-07-21 RX ADMIN — THERA TABS 1 TABLET: TAB at 09:07

## 2022-07-21 RX ADMIN — THIAMINE HCL TAB 100 MG 100 MG: 100 TAB at 09:07

## 2022-07-21 NOTE — NURSING
Pt awake and alert during discharge instructions. No new RXpt aware that MD office will be contacting him for an appointment and if they do not call him today, he must reach out to the office to schedule.  No distress noted, pt denies any pain. PIV removed from RUE x2, site without pain, redness, drainage or swelling. All questions and concerns addressed. Pt ambulated off floor, escorted by Nela WINTER

## 2022-07-21 NOTE — PLAN OF CARE
SABRA called to schedule [facility]. There was no answer. SABRA left a message instructing them to call patient to establish care. SW also informed clinic that patient will need referral to Hematologist.

## 2022-07-21 NOTE — ASSESSMENT & PLAN NOTE
-Patient with hx of alcohol use and tobacco abuse  -Pro calcitonin elevated at 8.43 on admit  -Urine cx with E.coli  -WBC trending up, now at 21.3. Repeat procal still elevated at 2.8, but improved compared to admit. patient afebrile and non-toxic appearing   -Continue abx  -Monitor closely  -CBC in AM    ID does not think infection.  Will d/c and have him follow up with dentist and Heme/onc

## 2022-07-21 NOTE — TELEPHONE ENCOUNTER
We are not contracted with his insurance  You will need to forward to another provider  Thank you Dallin Duggan RN  ===View-only below this line===  ----- Message -----  From: Macie Vogel  Sent: 7/21/2022   9:19 AM CDT  To: [facility] Staff  Subject: Hospital Follow Up                               Good Morning     Patient needs a 1 week hospital follow up appointment.     Thank you!

## 2022-07-21 NOTE — SUBJECTIVE & OBJECTIVE
Interval History: No new issues. Wants to go home     Review of Systems   Constitutional:  Negative for activity change, appetite change and chills.   HENT:  Negative for congestion and dental problem.    Eyes:  Negative for discharge and itching.   Respiratory:  Negative for apnea and chest tightness.    Cardiovascular:  Negative for chest pain and leg swelling.   Gastrointestinal:  Negative for abdominal distention and abdominal pain.   Endocrine: Negative for cold intolerance.   Genitourinary:  Negative for difficulty urinating.   Musculoskeletal:  Negative for arthralgias and back pain.   Skin:  Negative for color change and pallor.   Neurological:  Negative for light-headedness and numbness.   Psychiatric/Behavioral:  Negative for agitation.    Objective:     Vital Signs (Most Recent):  Temp: 97.7 °F (36.5 °C) (07/21/22 0718)  Pulse: (!) 54 (07/21/22 0718)  Resp: 18 (07/21/22 0718)  BP: 129/66 (07/21/22 0718)  SpO2: 100 % (07/21/22 0718)   Vital Signs (24h Range):  Temp:  [97.5 °F (36.4 °C)-98.5 °F (36.9 °C)] 97.7 °F (36.5 °C)  Pulse:  [54-70] 54  Resp:  [18-20] 18  SpO2:  [100 %] 100 %  BP: (129-166)/(66-77) 129/66     Weight: 74.6 kg (164 lb 7.4 oz)  Body mass index is 26.55 kg/m².    Intake/Output Summary (Last 24 hours) at 7/21/2022 0859  Last data filed at 7/20/2022 1730  Gross per 24 hour   Intake 720 ml   Output --   Net 720 ml      Physical Exam  Vitals and nursing note reviewed.   Constitutional:       General: He is not in acute distress.     Appearance: Normal appearance. He is normal weight. He is not ill-appearing, toxic-appearing or diaphoretic.   HENT:      Head: Normocephalic and atraumatic.   Eyes:      Conjunctiva/sclera: Conjunctivae normal.   Cardiovascular:      Rate and Rhythm: Normal rate and regular rhythm.   Pulmonary:      Effort: Pulmonary effort is normal. No respiratory distress.      Breath sounds: No wheezing.   Abdominal:      General: There is no distension.      Tenderness:  There is no abdominal tenderness.   Skin:     Coloration: Skin is not jaundiced.   Neurological:      Mental Status: He is alert and oriented to person, place, and time.   Psychiatric:         Mood and Affect: Mood normal.         Behavior: Behavior normal.         Thought Content: Thought content normal.       Significant Labs: All pertinent labs within the past 24 hours have been reviewed.  BMP:   Recent Labs   Lab 07/20/22  0348   GLU 97   *   K 4.3   CL 99   CO2 24   BUN 19   CREATININE 1.1   CALCIUM 7.9*     CBC:   Recent Labs   Lab 07/20/22  0901   WBC 19.57*   HGB 9.7*   HCT 26.8*          Significant Imaging:

## 2022-07-21 NOTE — PLAN OF CARE
[facility] - Med Surg  Discharge Final Note    Primary Care Provider: To Obtain Unable    Expected Discharge Date: 7/21/2022    All needs met. Clinic will call patient to schedule appointment. SW notified nurse Krishna that patient is cleared for discharge from case management standpoint.     Final Discharge Note (most recent)       Final Note - 07/21/22 1046          Final Note    Assessment Type Final Discharge Note (P)      Anticipated Discharge Disposition Home or Self Care (P)      What phone number can be called within the next 1-3 days to see how you are doing after discharge? [phone] (P)      Hospital Resources/Appts/Education Provided -- (P)    Clinic will call patient to schedule appointment.       Post-Acute Status    Coverage Medicaid (P)      Other Status No Post-Acute Service Needs (P)      Discharge Delays None known at this time (P)                      Important Message from Medicare             Contact Info       [address] [address]  [phone]       Next Steps: Follow up    Instructions: Clinic will call you to schedule appointment and will refer you to a Hematologist. If you have not heard from clinic by end of business. Please call them to schedule appointment. Let clinic know you need a referral to a Hematologist.

## 2022-07-21 NOTE — PROGRESS NOTES
[facility] Medicine  Progress Note    Patient Name: Paty Jiménez  MRN: [id number]  Patient Class: IP- Inpatient   Admission Date: 7/15/2022  Length of Stay: 6 days  Attending Physician: Hay Montoya MD  Primary Care Provider: To Obtain Unable        Subjective:     Principal Problem:Hyponatremia        HPI:  Mr Paty Jiménez is a 62 y.o. man who presents with weakness. He states he was in his normal state of health until last week. He says he usually runs 2 miles a day and bikes. He has started to feel dizzy- room spinning, not presyncope- with activity. He has nausea and decreased PO intake because he is not able to swallow normally. He cannot say if food gets stuck when swallowing. No pain with swallowing. No abdominal pain. Says that for the last week whenever he urinates he also defecates. He has no headaches, vision changes, numbness. His sister at bedside says he is more confused than usual.     No prior history of this. No long term medical problems. He states that he drinks 3 quarts of beer daily with last drink yesterday. He denies any history of alcohol withdrawal.     In ED, noted to have Na 118 with no recent prior for comparison.       Overview/Hospital Course:  Mr Paty Jiménez is a 62 y.o. man admitted to ICU on 07/15/22 with symptomatic hyponatremia. Started NS. Overcorrected and started D5. Stabilized Na. Now off IVF, continuing diet and monitoring Na. Most likely cause is alcohol use. TSH, cortisol unremarkable. On no Rx at home that may have caused hyponatremia. US abdomen with no cirrhosis. Also with UTI, started CTX and flomax for urinary hesitancy. Urine culture with E.coli. ID was consulted for continue WBC count of 20K. ID did not think this was infection and Abx stopped.  Rec: dental exam and out patient follow up with heme/onc. Patient was discharged to home on 7/21/22. Activity as tolerated.       Interval History: No new issues. Wants to go home     Review of  Systems   Constitutional:  Negative for activity change, appetite change and chills.   HENT:  Negative for congestion and dental problem.    Eyes:  Negative for discharge and itching.   Respiratory:  Negative for apnea and chest tightness.    Cardiovascular:  Negative for chest pain and leg swelling.   Gastrointestinal:  Negative for abdominal distention and abdominal pain.   Endocrine: Negative for cold intolerance.   Genitourinary:  Negative for difficulty urinating.   Musculoskeletal:  Negative for arthralgias and back pain.   Skin:  Negative for color change and pallor.   Neurological:  Negative for light-headedness and numbness.   Psychiatric/Behavioral:  Negative for agitation.    Objective:     Vital Signs (Most Recent):  Temp: 97.7 °F (36.5 °C) (07/21/22 0718)  Pulse: (!) 54 (07/21/22 0718)  Resp: 18 (07/21/22 0718)  BP: 129/66 (07/21/22 0718)  SpO2: 100 % (07/21/22 0718)   Vital Signs (24h Range):  Temp:  [97.5 °F (36.4 °C)-98.5 °F (36.9 °C)] 97.7 °F (36.5 °C)  Pulse:  [54-70] 54  Resp:  [18-20] 18  SpO2:  [100 %] 100 %  BP: (129-166)/(66-77) 129/66     Weight: 74.6 kg (164 lb 7.4 oz)  Body mass index is 26.55 kg/m².    Intake/Output Summary (Last 24 hours) at 7/21/2022 0859  Last data filed at 7/20/2022 1730  Gross per 24 hour   Intake 720 ml   Output --   Net 720 ml      Physical Exam  Vitals and nursing note reviewed.   Constitutional:       General: He is not in acute distress.     Appearance: Normal appearance. He is normal weight. He is not ill-appearing, toxic-appearing or diaphoretic.   HENT:      Head: Normocephalic and atraumatic.   Eyes:      Conjunctiva/sclera: Conjunctivae normal.   Cardiovascular:      Rate and Rhythm: Normal rate and regular rhythm.   Pulmonary:      Effort: Pulmonary effort is normal. No respiratory distress.      Breath sounds: No wheezing.   Abdominal:      General: There is no distension.      Tenderness: There is no abdominal tenderness.   Skin:     Coloration: Skin is  not jaundiced.   Neurological:      Mental Status: He is alert and oriented to person, place, and time.   Psychiatric:         Mood and Affect: Mood normal.         Behavior: Behavior normal.         Thought Content: Thought content normal.       Significant Labs: All pertinent labs within the past 24 hours have been reviewed.  BMP:   Recent Labs   Lab 07/20/22  0348   GLU 97   *   K 4.3   CL 99   CO2 24   BUN 19   CREATININE 1.1   CALCIUM 7.9*     CBC:   Recent Labs   Lab 07/20/22  0901   WBC 19.57*   HGB 9.7*   HCT 26.8*          Significant Imaging:       Assessment/Plan:      * Hyponatremia  Na 118 on admit. Due to alcohol abuse  - calculated serum osm= 286-294.   - Measured serum osm pending and urine osm 194  - TSH, cortisol unremarkable  - not on any Rx that would cause this  - CXR no infiltrate or mass  - liver US no cirrhosis    Started NS. Na overcorrected. Started D5w.   - DC IVF on 07/16  - Na gradually improving, now at 132  - BMP daily   - Discussed alcohol cessation  Now at 133.      Leukocytosis  -Patient with hx of alcohol use and tobacco abuse  -Pro calcitonin elevated at 8.43 on admit  -Urine cx with E.coli  -WBC trending up, now at 21.3. Repeat procal still elevated at 2.8, but improved compared to admit. patient afebrile and non-toxic appearing   -Continue abx  -Monitor closely  -CBC in AM    ID does not think infection.  Will d/c and have him follow up with dentist and Heme/onc       Hyperglycemia  A1c:   Lab Results   Component Value Date    HGBA1C 5.8 (H) 07/15/2022     No history of diabetes   ADA diet, accuchecks ACHS, hypoglycemic protocol    Dysphagia  -Resolved with correction of hypoNa      Tobacco use disorder  Patient was counseled on smoking cessation for 4 minutes. Patient smokes 1ppd. Dr. Guardado discussed how smoking is detrimental to the patient's health. Prescription for nicotine patch was offered. Patient declined.       Anemia, chronic disease  Hgb 11 on  admission.   - Iron profile and studies c/w Iron deficiency + anemia of chronic disease  - outpatient follow up for screening colonoscopy   - No evidence of bleeding  - Hgb stable     UTI (urinary tract infection)  UCx EColi  - continue CTX, day 4  - with urinary hesitancy- started flomax    WBC count still 21K. Will repeat in am.   Ultrasound no abnormalities     CBC pending this am. If trending down WBC count- will dc to home. If not- ID consult       Alcohol abuse  Drinks 3 quarts of beer daily with last drink 7/14/22.  on admit. Denies history of withdrawals. No signs of withdrawal. No cirrhosis on US  - CIWA Q4 monitoring  - PRN ativan for withdrawals  - continue vitamin supplementation         VTE Risk Mitigation (From admission, onward)         Ordered     Place RADHA hose  Until discontinued         07/15/22 1510     Place sequential compression device  Until discontinued         07/15/22 1510     IP VTE LOW RISK PATIENT  Once         07/15/22 1510                Discharge Planning   BROOKE:      Code Status: Full Code   Is the patient medically ready for discharge?:     Reason for patient still in hospital (select all that apply): Patient unstable  Discharge Plan A: Home   Discharge Delays: None known at this time              Hay Cleveland MD  Department of Hospital Medicine   [facility] - [place] - Med Surg

## 2022-07-27 ENCOUNTER — HOSPITAL ENCOUNTER (EMERGENCY)
Facility: HOSPITAL | Age: 62
Discharge: HOME | End: 2022-07-28
Attending: EMERGENCY MEDICINE
Payer: MEDICAID

## 2022-07-27 DIAGNOSIS — Z78.9 ALCOHOL USE: ICD-10-CM

## 2022-07-27 DIAGNOSIS — R55 SYNCOPE: ICD-10-CM

## 2022-07-27 DIAGNOSIS — D64.9 ANEMIA, UNSPECIFIED TYPE: ICD-10-CM

## 2022-07-27 DIAGNOSIS — E87.1 HYPONATREMIA: ICD-10-CM

## 2022-07-27 DIAGNOSIS — N30.00 ACUTE CYSTITIS WITHOUT HEMATURIA: ICD-10-CM

## 2022-07-27 DIAGNOSIS — R55 SYNCOPE, UNSPECIFIED SYNCOPE TYPE: Primary | ICD-10-CM

## 2022-07-27 LAB
ALBUMIN SERPL BCP-MCNC: 2.2 G/DL (ref 3.5–5.2)
ALP SERPL-CCNC: 106 U/L (ref 55–135)
ALT SERPL W/O P-5'-P-CCNC: 30 U/L (ref 10–44)
AMPHET+METHAMPHET UR QL: NEGATIVE
ANION GAP SERPL CALC-SCNC: 10 MMOL/L (ref 8–16)
AST SERPL-CCNC: 46 U/L (ref 10–40)
BACTERIA #/AREA URNS HPF: ABNORMAL /HPF
BARBITURATES UR QL SCN>200 NG/ML: NEGATIVE
BASOPHILS # BLD AUTO: 0.03 K/UL (ref 0–0.2)
BASOPHILS # BLD AUTO: 0.03 K/UL (ref 0–0.2)
BASOPHILS NFR BLD: 0.3 % (ref 0–1.9)
BASOPHILS NFR BLD: 0.4 % (ref 0–1.9)
BENZODIAZ UR QL SCN>200 NG/ML: NEGATIVE
BILIRUB SERPL-MCNC: 0.7 MG/DL (ref 0.1–1)
BILIRUB UR QL STRIP: NEGATIVE
BUN SERPL-MCNC: 10 MG/DL (ref 8–23)
BZE UR QL SCN: ABNORMAL
CALCIUM SERPL-MCNC: 7.7 MG/DL (ref 8.7–10.5)
CANNABINOIDS UR QL SCN: ABNORMAL
CHLORIDE SERPL-SCNC: 102 MMOL/L (ref 95–110)
CLARITY UR: CLEAR
CO2 SERPL-SCNC: 20 MMOL/L (ref 23–29)
COLOR UR: YELLOW
CREAT SERPL-MCNC: 1 MG/DL (ref 0.5–1.4)
CREAT UR-MCNC: 30.2 MG/DL (ref 23–375)
DIFFERENTIAL METHOD: ABNORMAL
DIFFERENTIAL METHOD: ABNORMAL
EOSINOPHIL # BLD AUTO: 0.1 K/UL (ref 0–0.5)
EOSINOPHIL # BLD AUTO: 0.2 K/UL (ref 0–0.5)
EOSINOPHIL NFR BLD: 1.4 % (ref 0–8)
EOSINOPHIL NFR BLD: 2.1 % (ref 0–8)
ERYTHROCYTE [DISTWIDTH] IN BLOOD BY AUTOMATED COUNT: 16.2 % (ref 11.5–14.5)
ERYTHROCYTE [DISTWIDTH] IN BLOOD BY AUTOMATED COUNT: 16.5 % (ref 11.5–14.5)
EST. GFR  (AFRICAN AMERICAN): >60 ML/MIN/1.73 M^2
EST. GFR  (NON AFRICAN AMERICAN): >60 ML/MIN/1.73 M^2
ETHANOL SERPL-MCNC: 93 MG/DL
GLUCOSE SERPL-MCNC: 99 MG/DL (ref 70–110)
GLUCOSE UR QL STRIP: NEGATIVE
HCT VFR BLD AUTO: 20.1 % (ref 40–54)
HCT VFR BLD AUTO: 21.6 % (ref 40–54)
HGB BLD-MCNC: 7.2 G/DL (ref 14–18)
HGB BLD-MCNC: 7.4 G/DL (ref 14–18)
HGB UR QL STRIP: ABNORMAL
IMM GRANULOCYTES # BLD AUTO: 0.05 K/UL (ref 0–0.04)
IMM GRANULOCYTES # BLD AUTO: 0.11 K/UL (ref 0–0.04)
IMM GRANULOCYTES NFR BLD AUTO: 0.6 % (ref 0–0.5)
IMM GRANULOCYTES NFR BLD AUTO: 1.4 % (ref 0–0.5)
KETONES UR QL STRIP: NEGATIVE
LEUKOCYTE ESTERASE UR QL STRIP: ABNORMAL
LYMPHOCYTES # BLD AUTO: 1.9 K/UL (ref 1–4.8)
LYMPHOCYTES # BLD AUTO: 2.3 K/UL (ref 1–4.8)
LYMPHOCYTES NFR BLD: 24.1 % (ref 18–48)
LYMPHOCYTES NFR BLD: 26.4 % (ref 18–48)
MCH RBC QN AUTO: 30.5 PG (ref 27–31)
MCH RBC QN AUTO: 31.2 PG (ref 27–31)
MCHC RBC AUTO-ENTMCNC: 34.3 G/DL (ref 32–36)
MCHC RBC AUTO-ENTMCNC: 35.8 G/DL (ref 32–36)
MCV RBC AUTO: 87 FL (ref 82–98)
MCV RBC AUTO: 89 FL (ref 82–98)
METHADONE UR QL SCN>300 NG/ML: NEGATIVE
MICROSCOPIC COMMENT: ABNORMAL
MONOCYTES # BLD AUTO: 0.7 K/UL (ref 0.3–1)
MONOCYTES # BLD AUTO: 0.9 K/UL (ref 0.3–1)
MONOCYTES NFR BLD: 10.2 % (ref 4–15)
MONOCYTES NFR BLD: 8.6 % (ref 4–15)
NEUTROPHILS # BLD AUTO: 5 K/UL (ref 1.8–7.7)
NEUTROPHILS # BLD AUTO: 5.2 K/UL (ref 1.8–7.7)
NEUTROPHILS NFR BLD: 60.4 % (ref 38–73)
NEUTROPHILS NFR BLD: 64.1 % (ref 38–73)
NITRITE UR QL STRIP: NEGATIVE
NRBC BLD-RTO: 0 /100 WBC
NRBC BLD-RTO: 0 /100 WBC
OPIATES UR QL SCN: NEGATIVE
PCP UR QL SCN>25 NG/ML: NEGATIVE
PH UR STRIP: 5 [PH] (ref 5–8)
PLATELET # BLD AUTO: 225 K/UL (ref 150–450)
PLATELET # BLD AUTO: 225 K/UL (ref 150–450)
PMV BLD AUTO: 9 FL (ref 9.2–12.9)
PMV BLD AUTO: 9.1 FL (ref 9.2–12.9)
POCT GLUCOSE: 245 MG/DL (ref 70–110)
POTASSIUM SERPL-SCNC: 3.9 MMOL/L (ref 3.5–5.1)
PROT SERPL-MCNC: 7 G/DL (ref 6–8.4)
PROT UR QL STRIP: NEGATIVE
RBC # BLD AUTO: 2.31 M/UL (ref 4.6–6.2)
RBC # BLD AUTO: 2.43 M/UL (ref 4.6–6.2)
RBC #/AREA URNS HPF: 0 /HPF (ref 0–4)
SODIUM SERPL-SCNC: 132 MMOL/L (ref 136–145)
SP GR UR STRIP: <1.005 (ref 1–1.03)
TOXICOLOGY INFORMATION: ABNORMAL
TROPONIN I SERPL DL<=0.01 NG/ML-MCNC: <0.006 NG/ML (ref 0–0.03)
TROPONIN I SERPL DL<=0.01 NG/ML-MCNC: <0.006 NG/ML (ref 0–0.03)
URN SPEC COLLECT METH UR: ABNORMAL
UROBILINOGEN UR STRIP-ACNC: NEGATIVE EU/DL
WBC # BLD AUTO: 7.8 K/UL (ref 3.9–12.7)
WBC # BLD AUTO: 8.6 K/UL (ref 3.9–12.7)
WBC #/AREA URNS HPF: 11 /HPF (ref 0–5)
WBC CLUMPS URNS QL MICRO: ABNORMAL

## 2022-07-27 PROCEDURE — 85025 COMPLETE CBC W/AUTO DIFF WBC: CPT | Mod: 91 | Performed by: EMERGENCY MEDICINE

## 2022-07-27 PROCEDURE — 96366 THER/PROPH/DIAG IV INF ADDON: CPT

## 2022-07-27 PROCEDURE — 96365 THER/PROPH/DIAG IV INF INIT: CPT

## 2022-07-27 PROCEDURE — 87186 SC STD MICRODIL/AGAR DIL: CPT | Mod: 59 | Performed by: NURSE PRACTITIONER

## 2022-07-27 PROCEDURE — 87088 URINE BACTERIA CULTURE: CPT | Performed by: NURSE PRACTITIONER

## 2022-07-27 PROCEDURE — 87086 URINE CULTURE/COLONY COUNT: CPT | Performed by: NURSE PRACTITIONER

## 2022-07-27 PROCEDURE — 93005 ELECTROCARDIOGRAM TRACING: CPT

## 2022-07-27 PROCEDURE — 81000 URINALYSIS NONAUTO W/SCOPE: CPT | Mod: 59 | Performed by: NURSE PRACTITIONER

## 2022-07-27 PROCEDURE — 93010 EKG 12-LEAD: ICD-10-PCS | Mod: ,,, | Performed by: INTERNAL MEDICINE

## 2022-07-27 PROCEDURE — 87491 CHLMYD TRACH DNA AMP PROBE: CPT | Performed by: EMERGENCY MEDICINE

## 2022-07-27 PROCEDURE — 87591 N.GONORRHOEAE DNA AMP PROB: CPT | Performed by: EMERGENCY MEDICINE

## 2022-07-27 PROCEDURE — 80307 DRUG TEST PRSMV CHEM ANLYZR: CPT | Performed by: EMERGENCY MEDICINE

## 2022-07-27 PROCEDURE — 99285 EMERGENCY DEPT VISIT HI MDM: CPT | Mod: 25

## 2022-07-27 PROCEDURE — 87077 CULTURE AEROBIC IDENTIFY: CPT | Performed by: NURSE PRACTITIONER

## 2022-07-27 PROCEDURE — 80053 COMPREHEN METABOLIC PANEL: CPT | Performed by: NURSE PRACTITIONER

## 2022-07-27 PROCEDURE — 82077 ASSAY SPEC XCP UR&BREATH IA: CPT | Performed by: EMERGENCY MEDICINE

## 2022-07-27 PROCEDURE — 85025 COMPLETE CBC W/AUTO DIFF WBC: CPT | Performed by: NURSE PRACTITIONER

## 2022-07-27 PROCEDURE — 93010 ELECTROCARDIOGRAM REPORT: CPT | Mod: ,,, | Performed by: INTERNAL MEDICINE

## 2022-07-27 PROCEDURE — 36000 PLACE NEEDLE IN VEIN: CPT

## 2022-07-27 PROCEDURE — 84484 ASSAY OF TROPONIN QUANT: CPT | Mod: 91 | Performed by: NURSE PRACTITIONER

## 2022-07-28 VITALS
TEMPERATURE: 98 F | SYSTOLIC BLOOD PRESSURE: 121 MMHG | BODY MASS INDEX: 27.83 KG/M2 | HEIGHT: 64 IN | HEART RATE: 57 BPM | RESPIRATION RATE: 21 BRPM | DIASTOLIC BLOOD PRESSURE: 67 MMHG | WEIGHT: 163 LBS | OXYGEN SATURATION: 100 %

## 2022-07-28 PROCEDURE — 63600175 PHARM REV CODE 636 W HCPCS: Performed by: EMERGENCY MEDICINE

## 2022-07-28 PROCEDURE — 25000003 PHARM REV CODE 250: Performed by: EMERGENCY MEDICINE

## 2022-07-28 RX ORDER — CEPHALEXIN 500 MG/1
500 CAPSULE ORAL EVERY 8 HOURS
Qty: 42 CAPSULE | Refills: 0 | Status: SHIPPED | OUTPATIENT
Start: 2022-07-28 | End: 2022-08-11

## 2022-07-28 RX ORDER — LANOLIN ALCOHOL/MO/W.PET/CERES
100 CREAM (GRAM) TOPICAL
Status: COMPLETED | OUTPATIENT
Start: 2022-07-28 | End: 2022-07-28

## 2022-07-28 RX ORDER — DOCUSATE SODIUM 100 MG/1
100 CAPSULE, LIQUID FILLED ORAL 2 TIMES DAILY PRN
Qty: 60 CAPSULE | Refills: 3 | Status: SHIPPED | OUTPATIENT
Start: 2022-07-28 | End: 2023-01-22

## 2022-07-28 RX ORDER — FERROUS SULFATE 325(65) MG
325 TABLET, DELAYED RELEASE (ENTERIC COATED) ORAL DAILY
Qty: 30 TABLET | Refills: 3 | Status: SHIPPED | OUTPATIENT
Start: 2022-07-28 | End: 2023-01-22

## 2022-07-28 RX ORDER — FOLIC ACID 1 MG/1
1 TABLET ORAL
Status: COMPLETED | OUTPATIENT
Start: 2022-07-28 | End: 2022-07-28

## 2022-07-28 RX ADMIN — CEFTRIAXONE 1 G: 1 INJECTION, SOLUTION INTRAVENOUS at 12:07

## 2022-07-28 RX ADMIN — FOLIC ACID 1 MG: 1 TABLET ORAL at 02:07

## 2022-07-28 RX ADMIN — THERA TABS 1 TABLET: TAB at 02:07

## 2022-07-28 RX ADMIN — THIAMINE HCL TAB 100 MG 100 MG: 100 TAB at 02:07

## 2022-07-28 NOTE — ED PROVIDER NOTES
"Encounter Date: 7/27/2022       History     Chief Complaint   Patient presents with    Loss of Consciousness     Pt presents to ED c/o syncope episodes 10 mins pta.  Pt also reports weakness. Pt reports he was sitting at the table when if felt weak and "pass out." Episode was witness by pt's niece.  Denies cp, sob, ha, dizziness, numbness, tingling, loc or head injury.  Pain 0/10. Denies being a DM.       63 yo male presents via niece s/p syncopal episode.  Patient states he was outside riding his bicycle all day and did not eat much.  He was sitting at his table and passed out, falling onto his niece; no trauma.  Patient regained consciousness without incident.  No chest pain.  Patient denies prior syncope.  He admits drinking a beer today.  He states he smokes marijuana.  He snorted cocaine 2 days ago (Monday 7/25/22).      Patient was admitted here 7/16/22-7/21/22 for weakness and dizziness, found to be hyponatremic with Na 118, attributed to EtOH.      Of note, I saw this patient for syncope related to EtOH and heat exposure in 2019.      Patient does not have outpatient physician.          Review of patient's allergies indicates:  No Known Allergies  No past medical history on file.  No past surgical history on file.  Family History   Problem Relation Age of Onset    Cancer Mother     Cancer Father         liver     Social History     Tobacco Use    Smoking status: Current Every Day Smoker     Types: Cigarettes   Substance Use Topics    Alcohol use: Yes     Comment: 3 quarts of beer daily    Drug use: Not Currently     Review of Systems   Constitutional: Negative for fever.   HENT: Negative for sore throat.    Eyes: Negative for photophobia.   Respiratory: Negative for shortness of breath.    Cardiovascular: Negative for chest pain.   Gastrointestinal: Negative for abdominal pain.   Genitourinary: Negative for dysuria.   Musculoskeletal: Negative for back pain and neck stiffness.   Skin: Negative for " rash.   Neurological: Positive for syncope.       Physical Exam     Initial Vitals [07/27/22 1952]   BP Pulse Resp Temp SpO2   (!) 111/58 82 17 98.2 °F (36.8 °C) 99 %      MAP       --         Physical Exam    Nursing note and vitals reviewed.  Constitutional: He appears well-developed and well-nourished. He is not diaphoretic.   Awake, alert, nontoxic.   HENT:   Head: Normocephalic and atraumatic.   Eyes: Conjunctivae and EOM are normal. Pupils are equal, round, and reactive to light.   Neck: Neck supple.   Normal range of motion.  Cardiovascular: Normal rate, regular rhythm and intact distal pulses.   Pulmonary/Chest: Breath sounds normal. No respiratory distress. He has no wheezes. He has no rhonchi. He has no rales.   Abdominal: Abdomen is soft. There is no abdominal tenderness.   Genitourinary: Rectum:      Guaiac result negative.   Guaiac negative stool. : Acceptable.  Musculoskeletal:         General: No tenderness or edema. Normal range of motion.      Cervical back: Normal range of motion and neck supple.     Neurological: He is alert and oriented to person, place, and time. He has normal strength.   Moving all extremities   Skin: Skin is warm and dry.   Psychiatric: He has a normal mood and affect.         ED Course   Procedures  Labs Reviewed   CBC W/ AUTO DIFFERENTIAL - Abnormal; Notable for the following components:       Result Value    RBC 2.43 (*)     Hemoglobin 7.4 (*)     Hematocrit 21.6 (*)     RDW 16.5 (*)     MPV 9.0 (*)     Immature Granulocytes 0.6 (*)     Immature Grans (Abs) 0.05 (*)     All other components within normal limits   COMPREHENSIVE METABOLIC PANEL - Abnormal; Notable for the following components:    Sodium 132 (*)     CO2 20 (*)     Calcium 7.7 (*)     Albumin 2.2 (*)     AST 46 (*)     All other components within normal limits   URINALYSIS, REFLEX TO URINE CULTURE - Abnormal; Notable for the following components:    Specific Gravity, UA <1.005 (*)     Occult  Blood UA Trace (*)     Leukocytes, UA 2+ (*)     All other components within normal limits    Narrative:     Specimen Source->Urine   DRUG SCREEN PANEL, URINE EMERGENCY - Abnormal; Notable for the following components:    Cocaine (Metab.) Presumptive Positive (*)     THC Presumptive Positive (*)     All other components within normal limits    Narrative:     Specimen Source->Urine   ALCOHOL,MEDICAL (ETHANOL) - Abnormal; Notable for the following components:    Alcohol, Serum 93 (*)     All other components within normal limits   URINALYSIS MICROSCOPIC - Abnormal; Notable for the following components:    WBC, UA 11 (*)     WBC Clumps, UA Occasional (*)     All other components within normal limits    Narrative:     Specimen Source->Urine   CBC W/ AUTO DIFFERENTIAL - Abnormal; Notable for the following components:    RBC 2.31 (*)     Hemoglobin 7.2 (*)     Hematocrit 20.1 (*)     MCH 31.2 (*)     RDW 16.2 (*)     MPV 9.1 (*)     Immature Granulocytes 1.4 (*)     Immature Grans (Abs) 0.11 (*)     All other components within normal limits   POCT GLUCOSE - Abnormal; Notable for the following components:    POCT Glucose 245 (*)     All other components within normal limits   CULTURE, URINE   C. TRACHOMATIS/N. GONORRHOEAE BY AMP DNA   TROPONIN I   TROPONIN I     EKG Readings: (Independently Interpreted)   20:09: NSR, HR 80. Normal axis. Q wave in aVF, V4-V6. No ectopy. No STEMI. C/w 7/15/22.       Imaging Results          CT Head Without Contrast (Final result)  Result time 07/27/22 20:50:02    Final result by Silverio Sharma MD (07/27/22 20:50:02)                 Impression:      No acute intracranial abnormalities identified.      Electronically signed by: Silverio Sharma MD  Date:    07/27/2022  Time:    20:50             Narrative:    EXAMINATION:  CT HEAD WITHOUT CONTRAST    CLINICAL HISTORY:  Syncope, recurrent;    TECHNIQUE:  Low dose axial images were obtained through the head.  Coronal and sagittal reformations  were also performed. Contrast was not administered.    COMPARISON:  None.    FINDINGS:  There is mild chronic microvascular ischemic change.  No evidence of acute/recent major vascular distribution cerebral infarction, intraparenchymal hemorrhage, or intra-axial space occupying lesion. The ventricular system is normal in size and configuration with no evidence of hydrocephalus. No effacement of the skull-base cisterns. No abnormal extra-axial fluid collections or blood products.  Right frontal sinus disease is seen.  Remaining visualized paranasal sinuses and mastoid air cells are essentially clear.  The calvarium shows no significant abnormality.                               X-Ray Chest AP Portable (Final result)  Result time 07/27/22 20:29:47    Final result by Silverio Sharma MD (07/27/22 20:29:47)                 Impression:      No acute cardiopulmonary process identified.      Electronically signed by: Silverio Sharma MD  Date:    07/27/2022  Time:    20:29             Narrative:    EXAMINATION:  XR CHEST AP PORTABLE    CLINICAL HISTORY:  syncope;    TECHNIQUE:  Single frontal view of the chest was performed.    COMPARISON:  07/15/2022.    FINDINGS:  Cardiac silhouette is normal in size.  Lungs are symmetrically expanded.  No evidence of new focal consolidative process, pneumothorax, or significant pleural effusion.  No acute osseous abnormality identified.                              X-Rays:   Independently Interpreted Readings:   Other Readings:  CXR NAD    Medications   cefTRIAXone (ROCEPHIN) 1 g/50 mL D5W IVPB (0 g Intravenous Stopped 7/28/22 0226)   thiamine tablet 100 mg (100 mg Oral Given 7/28/22 0230)   folic acid tablet 1 mg (1 mg Oral Given 7/28/22 0230)   multivitamin tablet (1 tablet Oral Given 7/28/22 0230)     Medical Decision Making:   History:   Old Medical Records: I decided to obtain old medical records.  Old Records Summarized: records from previous admission(s).  Initial Assessment:   62  yo male s/p syncopal episode.  Differential Diagnosis:   Ddx includes dehydration, dysrhythmia, ACS, occult infection, toxidrome, other.  Independently Interpreted Test(s):   I have ordered and independently interpreted X-rays - see prior notes.  I have ordered and independently interpreted EKG Reading(s) - see prior notes  Clinical Tests:   Lab Tests: Reviewed and Ordered  Radiological Study: Ordered and Reviewed  Medical Tests: Ordered and Reviewed  ED Management:  EKG no STEMI. Morphology c/w prior.     CXR NAD.    Labs: EtOH 93. Tox + cocaine, THC. UA 2+ leuks. Troponin negative x 2, ruling out ACS.     Hgb 7.4, 7.2. This is a drop from Hgb 9.7 on 7/20/22. Patient is hemeoccult negative and denies bloody urine, stools, or emesis. Unclear why patient's Hgb dropped so rapidly this week. Patient's vitals in ED are reassuring. I have rx'ed supplemental iron (with stool softener).     Urine cx 7/15/22 positive for E coli so patient tx'ed in ED with IV rocephin 1g and d/c'ed on PO keflex 500mg PO TID x 2 weeks.     I suspect syncope today was due as prior to poor PO intake + heat exposure + EtOH +/- THC and cocaine. I have advised patient not to drink to excess or use illicit substances.     I have strongly advised PMD f/u and have placed referral to primary care.     D/c'ed with family member.                      Clinical Impression:   Final diagnoses:  [R55] Syncope  [R55] Syncope, unspecified syncope type (Primary)  [D64.9] Anemia, unspecified type  [Z72.89] Alcohol use  [E87.1] Hyponatremia  [N30.00] Acute cystitis without hematuria          ED Disposition Condition    Discharge Stable        ED Prescriptions     Medication Sig Dispense Start Date End Date Auth. Provider    cephALEXin (KEFLEX) 500 MG capsule Take 1 capsule (500 mg total) by mouth every 8 (eight) hours. for 14 days 42 capsule 7/28/2022 8/11/2022 Joslyn Garcia MD    ferrous sulfate 325 (65 FE) MG EC tablet Take 1 tablet (325 mg total) by mouth  once daily. 30 tablet 7/28/2022  Joslyn Garcia MD    docusate sodium (COLACE) 100 MG capsule Take 1 capsule (100 mg total) by mouth 2 (two) times daily as needed for Constipation. Take 1-2 times a day while taking iron to prevent or treat constipation. 60 capsule 7/28/2022  Joslyn Garcia MD        Follow-up Information     Follow up With Specialties Details Why Contact Info Additional Information    [facility]   [address] [place] [id number] [phone] [address] Floor           Joslyn Garcia MD  07/28/22 0934

## 2022-07-28 NOTE — FIRST PROVIDER EVALUATION
"Medical screening exam completed.  I have conducted a focused provider triage encounter, findings are as follows:    Brief history of present illness:  Per niece, patient was sitting at the table and had a syncopal episode that lasted approximately 5 minutes; woke up and acting normal since; no AMS, weakness, or neuro deficits per family    Vitals:    07/27/22 1952   BP: (!) 111/58   BP Location: Left arm   Patient Position: Sitting   Pulse: 82   Resp: 17   Temp: 98.2 °F (36.8 °C)   TempSrc: Oral   SpO2: 99%   Weight: 73.9 kg (163 lb)   Height: 5' 4" (1.626 m)       Pertinent physical exam:  Normal neuro exam, GCS 15    Brief workup plan:  EKG, CBG, CT head, labs    Preliminary workup initiated; this workup will be continued and followed by the physician or advanced practice provider that is assigned to the patient when roomed.  "

## 2022-07-28 NOTE — ED NOTES
Pt states that he was sitting at the table about to eat some pie when he fell out of the chair onto his niece with LOC. He states that he did not hit his head, but landed on his niece. Denies any pain at this time. Resting in bed with lights off.

## 2022-07-29 LAB
BACTERIA UR CULT: ABNORMAL
BACTERIA UR CULT: ABNORMAL
C TRACH DNA SPEC QL NAA+PROBE: NOT DETECTED
N GONORRHOEA DNA SPEC QL NAA+PROBE: NOT DETECTED

## 2022-09-12 NOTE — ASSESSMENT & PLAN NOTE
-Patient with hx of alcohol use and tobacco abuse  -Pro calcitonin elevated at 8.43 on admit  -Urine cx with E.coli  -WBC trending up, now at 21.3. Repeat procal still elevated at 2.8, but improved compared to admit. patient afebrile and non-toxic appearing   -Continue abx  -Monitor closely  -CBC in AM     39w2d

## 2023-01-21 ENCOUNTER — HOSPITAL ENCOUNTER (OUTPATIENT)
Facility: HOSPITAL | Age: 63
Discharge: HOME | End: 2023-01-24
Attending: EMERGENCY MEDICINE | Admitting: HOSPITALIST
Payer: MEDICAID

## 2023-01-21 DIAGNOSIS — R50.9 FEVER: ICD-10-CM

## 2023-01-21 DIAGNOSIS — R56.9 SEIZURE-LIKE ACTIVITY: Primary | ICD-10-CM

## 2023-01-21 DIAGNOSIS — R56.9 SEIZURE: ICD-10-CM

## 2023-01-21 DIAGNOSIS — F10.239 ALCOHOL DEPENDENCE WITH WITHDRAWAL WITH COMPLICATION: ICD-10-CM

## 2023-01-21 PROCEDURE — 87502 INFLUENZA DNA AMP PROBE: CPT

## 2023-01-21 PROCEDURE — 99285 EMERGENCY DEPT VISIT HI MDM: CPT | Mod: 25

## 2023-01-21 RX ORDER — LORAZEPAM 0.5 MG/1
1 TABLET ORAL
Status: COMPLETED | OUTPATIENT
Start: 2023-01-22 | End: 2023-01-22

## 2023-01-22 PROBLEM — R56.9 SEIZURE-LIKE ACTIVITY: Status: ACTIVE | Noted: 2023-01-22

## 2023-01-22 PROBLEM — E83.51 HYPOCALCEMIA: Status: ACTIVE | Noted: 2023-01-22

## 2023-01-22 LAB
ALBUMIN SERPL BCP-MCNC: 2.6 G/DL (ref 3.5–5.2)
ALBUMIN SERPL BCP-MCNC: 3.2 G/DL (ref 3.5–5.2)
ALP SERPL-CCNC: 102 U/L (ref 55–135)
ALP SERPL-CCNC: 83 U/L (ref 55–135)
ALT SERPL W/O P-5'-P-CCNC: 28 U/L (ref 10–44)
ALT SERPL W/O P-5'-P-CCNC: 37 U/L (ref 10–44)
AMORPH CRY URNS QL MICRO: ABNORMAL
ANION GAP SERPL CALC-SCNC: 10 MMOL/L (ref 8–16)
ANION GAP SERPL CALC-SCNC: 13 MMOL/L (ref 8–16)
APTT BLDCRRT: 22.2 SEC (ref 21–32)
AST SERPL-CCNC: 71 U/L (ref 10–40)
AST SERPL-CCNC: 91 U/L (ref 10–40)
AV INDEX (PROSTH): 0.91
AV MEAN GRADIENT: 4 MMHG
AV PEAK GRADIENT: 8 MMHG
AV VALVE AREA: 2.96 CM2
AV VELOCITY RATIO: 0.83
BACTERIA #/AREA URNS HPF: ABNORMAL /HPF
BASOPHILS # BLD AUTO: 0.01 K/UL (ref 0–0.2)
BASOPHILS # BLD AUTO: 0.01 K/UL (ref 0–0.2)
BASOPHILS NFR BLD: 0.1 % (ref 0–1.9)
BASOPHILS NFR BLD: 0.2 % (ref 0–1.9)
BILIRUB SERPL-MCNC: 0.7 MG/DL (ref 0.1–1)
BILIRUB SERPL-MCNC: 0.9 MG/DL (ref 0.1–1)
BILIRUB UR QL STRIP: NEGATIVE
BSA FOR ECHO PROCEDURE: 1.75 M2
BUN SERPL-MCNC: 16 MG/DL (ref 8–23)
BUN SERPL-MCNC: 17 MG/DL (ref 8–23)
CALCIUM SERPL-MCNC: 7.1 MG/DL (ref 8.7–10.5)
CALCIUM SERPL-MCNC: 8.5 MG/DL (ref 8.7–10.5)
CHLORIDE SERPL-SCNC: 101 MMOL/L (ref 95–110)
CHLORIDE SERPL-SCNC: 110 MMOL/L (ref 95–110)
CHOLEST SERPL-MCNC: 180 MG/DL (ref 120–199)
CHOLEST/HDLC SERPL: 2.6 {RATIO} (ref 2–5)
CK MB SERPL-MCNC: 4.2 NG/ML (ref 0.1–6.5)
CK MB SERPL-RTO: 1.6 % (ref 0–5)
CK SERPL-CCNC: 267 U/L (ref 20–200)
CLARITY UR: ABNORMAL
CO2 SERPL-SCNC: 16 MMOL/L (ref 23–29)
CO2 SERPL-SCNC: 20 MMOL/L (ref 23–29)
COLOR UR: YELLOW
CREAT SERPL-MCNC: 0.8 MG/DL (ref 0.5–1.4)
CREAT SERPL-MCNC: 1 MG/DL (ref 0.5–1.4)
CTP QC/QA: YES
CTP QC/QA: YES
CV ECHO LV RWT: 0.46 CM
DIFFERENTIAL METHOD: ABNORMAL
DIFFERENTIAL METHOD: ABNORMAL
DOP CALC AO PEAK VEL: 1.42 M/S
DOP CALC AO VTI: 27 CM
DOP CALC LVOT AREA: 3.3 CM2
DOP CALC LVOT DIAMETER: 2.04 CM
DOP CALC LVOT PEAK VEL: 1.18 M/S
DOP CALC LVOT STROKE VOLUME: 80.04 CM3
DOP CALCLVOT PEAK VEL VTI: 24.5 CM
E WAVE DECELERATION TIME: 239.94 MSEC
E/A RATIO: 1.25
E/E' RATIO: 6.08 M/S
ECHO LV POSTERIOR WALL: 1.07 CM (ref 0.6–1.1)
EJECTION FRACTION: 65 %
EOSINOPHIL # BLD AUTO: 0 K/UL (ref 0–0.5)
EOSINOPHIL # BLD AUTO: 0 K/UL (ref 0–0.5)
EOSINOPHIL NFR BLD: 0 % (ref 0–8)
EOSINOPHIL NFR BLD: 0 % (ref 0–8)
ERYTHROCYTE [DISTWIDTH] IN BLOOD BY AUTOMATED COUNT: 13.9 % (ref 11.5–14.5)
ERYTHROCYTE [DISTWIDTH] IN BLOOD BY AUTOMATED COUNT: 14.5 % (ref 11.5–14.5)
EST. GFR  (NO RACE VARIABLE): >60 ML/MIN/1.73 M^2
EST. GFR  (NO RACE VARIABLE): >60 ML/MIN/1.73 M^2
ETHANOL SERPL-MCNC: <10 MG/DL
FOLATE SERPL-MCNC: 5.7 NG/ML (ref 4–24)
FRACTIONAL SHORTENING: 36 % (ref 28–44)
GLUCOSE SERPL-MCNC: 110 MG/DL (ref 70–110)
GLUCOSE SERPL-MCNC: 121 MG/DL (ref 70–110)
GLUCOSE UR QL STRIP: NEGATIVE
HCT VFR BLD AUTO: 31 % (ref 40–54)
HCT VFR BLD AUTO: 35.2 % (ref 40–54)
HDLC SERPL-MCNC: 69 MG/DL (ref 40–75)
HDLC SERPL: 38.3 % (ref 20–50)
HGB BLD-MCNC: 11.2 G/DL (ref 14–18)
HGB BLD-MCNC: 12.2 G/DL (ref 14–18)
HGB UR QL STRIP: ABNORMAL
HYALINE CASTS #/AREA URNS LPF: 0 /LPF
IMM GRANULOCYTES # BLD AUTO: 0.01 K/UL (ref 0–0.04)
IMM GRANULOCYTES # BLD AUTO: 0.02 K/UL (ref 0–0.04)
IMM GRANULOCYTES NFR BLD AUTO: 0.2 % (ref 0–0.5)
IMM GRANULOCYTES NFR BLD AUTO: 0.3 % (ref 0–0.5)
INR PPP: 1.2 (ref 0.8–1.2)
INTERVENTRICULAR SEPTUM: 0.82 CM (ref 0.6–1.1)
IVC DIAMETER: 1.2 CM
IVRT: 108.47 MSEC
KETONES UR QL STRIP: ABNORMAL
LA MAJOR: 5.08 CM
LA MINOR: 5.06 CM
LA WIDTH: 5.1 CM
LDLC SERPL CALC-MCNC: 92.6 MG/DL (ref 63–159)
LEFT ATRIUM SIZE: 3.52 CM
LEFT ATRIUM VOLUME INDEX: 44.7 ML/M2
LEFT ATRIUM VOLUME: 77.36 CM3
LEFT INTERNAL DIMENSION IN SYSTOLE: 2.99 CM (ref 2.1–4)
LEFT VENTRICLE DIASTOLIC VOLUME INDEX: 57.45 ML/M2
LEFT VENTRICLE DIASTOLIC VOLUME: 99.39 ML
LEFT VENTRICLE MASS INDEX: 86 G/M2
LEFT VENTRICLE SYSTOLIC VOLUME INDEX: 20.1 ML/M2
LEFT VENTRICLE SYSTOLIC VOLUME: 34.73 ML
LEFT VENTRICULAR INTERNAL DIMENSION IN DIASTOLE: 4.64 CM (ref 3.5–6)
LEFT VENTRICULAR MASS: 149.15 G
LEUKOCYTE ESTERASE UR QL STRIP: ABNORMAL
LV LATERAL E/E' RATIO: 4.94 M/S
LV SEPTAL E/E' RATIO: 7.9 M/S
LVOT MG: 2.75 MMHG
LVOT MV: 0.78 CM/S
LYMPHOCYTES # BLD AUTO: 0.7 K/UL (ref 1–4.8)
LYMPHOCYTES # BLD AUTO: 1.3 K/UL (ref 1–4.8)
LYMPHOCYTES NFR BLD: 16 % (ref 18–48)
LYMPHOCYTES NFR BLD: 19.1 % (ref 18–48)
MAGNESIUM SERPL-MCNC: 2.4 MG/DL (ref 1.6–2.6)
MCH RBC QN AUTO: 30.6 PG (ref 27–31)
MCH RBC QN AUTO: 30.9 PG (ref 27–31)
MCHC RBC AUTO-ENTMCNC: 34.7 G/DL (ref 32–36)
MCHC RBC AUTO-ENTMCNC: 36.1 G/DL (ref 32–36)
MCV RBC AUTO: 86 FL (ref 82–98)
MCV RBC AUTO: 88 FL (ref 82–98)
MICROSCOPIC COMMENT: ABNORMAL
MONOCYTES # BLD AUTO: 0.5 K/UL (ref 0.3–1)
MONOCYTES # BLD AUTO: 0.9 K/UL (ref 0.3–1)
MONOCYTES NFR BLD: 11.2 % (ref 4–15)
MONOCYTES NFR BLD: 12.3 % (ref 4–15)
MV PEAK A VEL: 0.63 M/S
MV PEAK E VEL: 0.79 M/S
MV STENOSIS PRESSURE HALF TIME: 69.58 MS
MV VALVE AREA P 1/2 METHOD: 3.16 CM2
NEUTROPHILS # BLD AUTO: 3.3 K/UL (ref 1.8–7.7)
NEUTROPHILS # BLD AUTO: 4.7 K/UL (ref 1.8–7.7)
NEUTROPHILS NFR BLD: 68.2 % (ref 38–73)
NEUTROPHILS NFR BLD: 72.4 % (ref 38–73)
NITRITE UR QL STRIP: NEGATIVE
NONHDLC SERPL-MCNC: 111 MG/DL
NRBC BLD-RTO: 0 /100 WBC
NRBC BLD-RTO: 0 /100 WBC
PH UR STRIP: 5 [PH] (ref 5–8)
PHOSPHATE SERPL-MCNC: 2.4 MG/DL (ref 2.7–4.5)
PISA TR MAX VEL: 2.1 M/S
PLATELET # BLD AUTO: 146 K/UL (ref 150–450)
PLATELET # BLD AUTO: 150 K/UL (ref 150–450)
PMV BLD AUTO: 10.8 FL (ref 9.2–12.9)
PMV BLD AUTO: 9.5 FL (ref 9.2–12.9)
POC MOLECULAR INFLUENZA A AGN: NEGATIVE
POC MOLECULAR INFLUENZA B AGN: NEGATIVE
POTASSIUM SERPL-SCNC: 3.9 MMOL/L (ref 3.5–5.1)
POTASSIUM SERPL-SCNC: 5.1 MMOL/L (ref 3.5–5.1)
PROT SERPL-MCNC: 5.7 G/DL (ref 6–8.4)
PROT SERPL-MCNC: 7.4 G/DL (ref 6–8.4)
PROT UR QL STRIP: ABNORMAL
PROTHROMBIN TIME: 12.3 SEC (ref 9–12.5)
PV PEAK VELOCITY: 1.02 CM/S
RA MAJOR: 4.87 CM
RA PRESSURE: 3 MMHG
RA WIDTH: 4.4 CM
RBC # BLD AUTO: 3.62 M/UL (ref 4.6–6.2)
RBC # BLD AUTO: 3.99 M/UL (ref 4.6–6.2)
RBC #/AREA URNS HPF: 6 /HPF (ref 0–4)
RIGHT VENTRICULAR END-DIASTOLIC DIMENSION: 3.93 CM
RV TISSUE DOPPLER FREE WALL SYSTOLIC VELOCITY 1 (APICAL 4 CHAMBER VIEW): 0.02 CM/S
SARS-COV-2 RDRP RESP QL NAA+PROBE: NEGATIVE
SINUS: 3.13 CM
SODIUM SERPL-SCNC: 134 MMOL/L (ref 136–145)
SODIUM SERPL-SCNC: 136 MMOL/L (ref 136–145)
SP GR UR STRIP: 1.01 (ref 1–1.03)
SQUAMOUS #/AREA URNS HPF: 25 /HPF
STJ: 2.15 CM
TDI LATERAL: 0.16 M/S
TDI SEPTAL: 0.1 M/S
TDI: 0.13 M/S
TR MAX PG: 18 MMHG
TRICUSPID ANNULAR PLANE SYSTOLIC EXCURSION: 2.22 CM
TRIGL SERPL-MCNC: 92 MG/DL (ref 30–150)
TROPONIN I SERPL DL<=0.01 NG/ML-MCNC: 0.03 NG/ML (ref 0–0.03)
TROPONIN I SERPL DL<=0.01 NG/ML-MCNC: 0.03 NG/ML (ref 0–0.03)
TSH SERPL DL<=0.005 MIU/L-ACNC: 0.56 UIU/ML (ref 0.4–4)
TV REST PULMONARY ARTERY PRESSURE: 21 MMHG
URN SPEC COLLECT METH UR: ABNORMAL
UROBILINOGEN UR STRIP-ACNC: NEGATIVE EU/DL
VIT B12 SERPL-MCNC: 882 PG/ML (ref 210–950)
WBC # BLD AUTO: 4.55 K/UL (ref 3.9–12.7)
WBC # BLD AUTO: 6.92 K/UL (ref 3.9–12.7)
WBC #/AREA URNS HPF: 9 /HPF (ref 0–5)

## 2023-01-22 PROCEDURE — 80053 COMPREHEN METABOLIC PANEL: CPT | Performed by: EMERGENCY MEDICINE

## 2023-01-22 PROCEDURE — 83735 ASSAY OF MAGNESIUM: CPT | Performed by: NURSE PRACTITIONER

## 2023-01-22 PROCEDURE — 81000 URINALYSIS NONAUTO W/SCOPE: CPT | Performed by: EMERGENCY MEDICINE

## 2023-01-22 PROCEDURE — 86592 SYPHILIS TEST NON-TREP QUAL: CPT | Performed by: NURSE PRACTITIONER

## 2023-01-22 PROCEDURE — 85025 COMPLETE CBC W/AUTO DIFF WBC: CPT | Mod: 91 | Performed by: NURSE PRACTITIONER

## 2023-01-22 PROCEDURE — G0378 HOSPITAL OBSERVATION PER HR: HCPCS

## 2023-01-22 PROCEDURE — 96365 THER/PROPH/DIAG IV INF INIT: CPT

## 2023-01-22 PROCEDURE — 93005 ELECTROCARDIOGRAM TRACING: CPT

## 2023-01-22 PROCEDURE — 82077 ASSAY SPEC XCP UR&BREATH IA: CPT | Performed by: EMERGENCY MEDICINE

## 2023-01-22 PROCEDURE — 92523 SPEECH SOUND LANG COMPREHEN: CPT

## 2023-01-22 PROCEDURE — 84484 ASSAY OF TROPONIN QUANT: CPT | Mod: 91 | Performed by: NURSE PRACTITIONER

## 2023-01-22 PROCEDURE — 93010 EKG 12-LEAD: ICD-10-PCS | Mod: ,,, | Performed by: INTERNAL MEDICINE

## 2023-01-22 PROCEDURE — 87635 SARS-COV-2 COVID-19 AMP PRB: CPT | Performed by: EMERGENCY MEDICINE

## 2023-01-22 PROCEDURE — 80053 COMPREHEN METABOLIC PANEL: CPT | Mod: 91 | Performed by: NURSE PRACTITIONER

## 2023-01-22 PROCEDURE — 82607 VITAMIN B-12: CPT | Performed by: NURSE PRACTITIONER

## 2023-01-22 PROCEDURE — 85610 PROTHROMBIN TIME: CPT | Performed by: NURSE PRACTITIONER

## 2023-01-22 PROCEDURE — 82746 ASSAY OF FOLIC ACID SERUM: CPT | Performed by: NURSE PRACTITIONER

## 2023-01-22 PROCEDURE — 96361 HYDRATE IV INFUSION ADD-ON: CPT

## 2023-01-22 PROCEDURE — 92610 EVALUATE SWALLOWING FUNCTION: CPT

## 2023-01-22 PROCEDURE — 84100 ASSAY OF PHOSPHORUS: CPT | Performed by: NURSE PRACTITIONER

## 2023-01-22 PROCEDURE — 85730 THROMBOPLASTIN TIME PARTIAL: CPT | Performed by: NURSE PRACTITIONER

## 2023-01-22 PROCEDURE — 25000003 PHARM REV CODE 250: Mod: TB,JG | Performed by: EMERGENCY MEDICINE

## 2023-01-22 PROCEDURE — 83036 HEMOGLOBIN GLYCOSYLATED A1C: CPT | Performed by: NURSE PRACTITIONER

## 2023-01-22 PROCEDURE — 93010 ELECTROCARDIOGRAM REPORT: CPT | Mod: ,,, | Performed by: INTERNAL MEDICINE

## 2023-01-22 PROCEDURE — 80061 LIPID PANEL: CPT | Performed by: NURSE PRACTITIONER

## 2023-01-22 PROCEDURE — 84443 ASSAY THYROID STIM HORMONE: CPT | Performed by: NURSE PRACTITIONER

## 2023-01-22 PROCEDURE — 85025 COMPLETE CBC W/AUTO DIFF WBC: CPT | Performed by: EMERGENCY MEDICINE

## 2023-01-22 PROCEDURE — 82553 CREATINE MB FRACTION: CPT | Performed by: NURSE PRACTITIONER

## 2023-01-22 RX ORDER — ACETAMINOPHEN 325 MG/1
650 TABLET ORAL EVERY 6 HOURS PRN
Status: DISCONTINUED | OUTPATIENT
Start: 2023-01-22 | End: 2023-01-24 | Stop reason: HOSPADM

## 2023-01-22 RX ORDER — LORAZEPAM 2 MG/ML
1 INJECTION INTRAMUSCULAR EVERY 4 HOURS PRN
Status: DISCONTINUED | OUTPATIENT
Start: 2023-01-22 | End: 2023-01-24 | Stop reason: HOSPADM

## 2023-01-22 RX ORDER — CALCIUM GLUCONATE 20 MG/ML
1 INJECTION, SOLUTION INTRAVENOUS
Status: DISPENSED | OUTPATIENT
Start: 2023-01-22 | End: 2023-01-22

## 2023-01-22 RX ORDER — MAG HYDROX/ALUMINUM HYD/SIMETH 200-200-20
30 SUSPENSION, ORAL (FINAL DOSE FORM) ORAL EVERY 6 HOURS PRN
Status: DISCONTINUED | OUTPATIENT
Start: 2023-01-22 | End: 2023-01-24 | Stop reason: HOSPADM

## 2023-01-22 RX ORDER — LANOLIN ALCOHOL/MO/W.PET/CERES
100 CREAM (GRAM) TOPICAL DAILY
Status: DISCONTINUED | OUTPATIENT
Start: 2023-01-22 | End: 2023-01-24 | Stop reason: HOSPADM

## 2023-01-22 RX ORDER — SODIUM CHLORIDE 0.9 % (FLUSH) 0.9 %
10 SYRINGE (ML) INJECTION
Status: DISCONTINUED | OUTPATIENT
Start: 2023-01-22 | End: 2023-01-24 | Stop reason: HOSPADM

## 2023-01-22 RX ADMIN — CALCIUM GLUCONATE 1 G: 20 INJECTION, SOLUTION INTRAVENOUS at 04:01

## 2023-01-22 RX ADMIN — THIAMINE HCL TAB 100 MG 100 MG: 100 TAB at 09:01

## 2023-01-22 RX ADMIN — SODIUM CHLORIDE 1000 ML: 9 INJECTION, SOLUTION INTRAVENOUS at 12:01

## 2023-01-22 RX ADMIN — THERA TABS 1 TABLET: TAB at 09:01

## 2023-01-22 RX ADMIN — LORAZEPAM 1 MG: 0.5 TABLET ORAL at 12:01

## 2023-01-22 NOTE — PT/OT/SLP PROGRESS
Occupational Therapy      Patient Name:  Paty Jiménez   MRN:  [id number]    Patient not seen OOB for OT eval today secondary to aggression.  RN endorsing hold eval,  advising hold mobilization . Ativan administered. OT will follow-up 01/23/23 as requested.     1/22/2023

## 2023-01-22 NOTE — ED PROVIDER NOTES
"Encounter Date: 1/21/2023    SCRIBE #1 NOTE: I, Sonya Grace, am scribing for, and in the presence of,  Antonio Hdz MD. I have scribed the following portions of the note - Other sections scribed: HPI, ROS, PE.     History     Chief Complaint   Patient presents with    Seizures     Witnessed seizure at 2225 lasting approx 3 minutes. No known hx of seizure. Bystanders could not provide any medical hx info. Pt with GCS of 14 at this time.      Paty Jiménez is a 62 y.o. male, with a past medical history of Alcohol Abuse, who presents to the ED with seizure onset 1.5 hours ago. Per EMS, patient was watching TV with family when he had a 3 minute witnessed seizure. EMS states that the patient reportedly has no prior history of seizures. Per EMS, the patient had a CBG of 158 and was not oriented to person or time in the field. EMS denies administering any medications en route to the ED. No exacerbating or alleviating factors. Patient endorses EtOH use with beer and states that he drinks "every day." Patient denies headache, chest pain, nausea, abdominal pain, neck pain, or other associated symptoms. Patient is a poor historian and is unaccompanied at bedside, thus this is the extent of history available at this time.      The history is provided by the patient and the EMS personnel.   Review of patient's allergies indicates:  No Known Allergies  No past medical history on file.  No past surgical history on file.  Family History   Problem Relation Age of Onset    Cancer Mother     Cancer Father         liver     Social History     Tobacco Use    Smoking status: Every Day     Types: Cigarettes   Substance Use Topics    Alcohol use: Yes     Comment: 3 quarts of beer daily    Drug use: Not Currently     Review of Systems   Constitutional:  Negative for chills and fever.   HENT:  Negative for congestion.    Respiratory:  Negative for cough and shortness of breath.    Cardiovascular:  Negative for chest pain. "   Gastrointestinal:  Negative for abdominal pain, diarrhea and nausea.   Genitourinary:  Negative for dysuria and frequency.   Musculoskeletal:  Negative for back pain and neck pain.   Skin:  Negative for rash.   Neurological:  Positive for seizures. Negative for speech difficulty, weakness, numbness and headaches.   Psychiatric/Behavioral:  Positive for confusion.      Physical Exam     Initial Vitals [01/21/23 2343]   BP Pulse Resp Temp SpO2   (!) 200/96 90 18 98.6 °F (37 °C) 98 %      MAP       --         Physical Exam    Nursing note and vitals reviewed.  Constitutional: He appears well-developed. He is not diaphoretic. No distress.   Difficulty following commands.    HENT:   Head: Normocephalic.   Bite wound to left side of tongue.   Eyes: EOM are normal.   Pupils 2 mm and reactive. EOMI. No nystagmus.   Cardiovascular:  Normal rate and regular rhythm.           No murmur heard.  Pulmonary/Chest: Effort normal and breath sounds normal. He has no wheezes.   Abdominal: Abdomen is soft. He exhibits no distension. There is no abdominal tenderness.   Musculoskeletal:         General: Normal range of motion.     Neurological: He is alert.   Confused. Oriented to location, but not time or situation. Normal finger to nose. Subtle distal tremor to bilateral upper extremities.   Skin: Skin is warm.       ED Course   Procedures  Labs Reviewed   CBC W/ AUTO DIFFERENTIAL - Abnormal; Notable for the following components:       Result Value    RBC 3.99 (*)     Hemoglobin 12.2 (*)     Hematocrit 35.2 (*)     Lymph # 0.7 (*)     Lymph % 16.0 (*)     All other components within normal limits   COMPREHENSIVE METABOLIC PANEL - Abnormal; Notable for the following components:    CO2 16 (*)     Glucose 121 (*)     Calcium 7.1 (*)     Total Protein 5.7 (*)     Albumin 2.6 (*)     AST 71 (*)     All other components within normal limits   ALCOHOL,MEDICAL (ETHANOL)   URINALYSIS, REFLEX TO URINE CULTURE   SARS-COV-2 RDRP GENE   POCT  INFLUENZA A/B MOLECULAR   POCT GLUCOSE MONITORING CONTINUOUS          Imaging Results              CT Head Without Contrast (Final result)  Result time 01/22/23 01:22:45      Final result by Dorothea Dove MD (01/22/23 01:22:45)                   Impression:      1. No CT evidence of acute intracranial abnormality. Clinical correlation and further evaluation as warranted noting MRI would provide more definitive evaluation of epileptogenic foci.  2. Generalized cerebral and cerebellar volume loss.  Findings suggestive of chronic microvascular ischemic change.      Electronically signed by: Dorothea Dove MD  Date:    01/22/2023  Time:    01:22               Narrative:    EXAMINATION:  CT HEAD WITHOUT CONTRAST    CLINICAL HISTORY:  seizure;    TECHNIQUE:  Low dose axial images were obtained through the head.  Coronal and sagittal reformations were also performed. Contrast was not administered.    COMPARISON:  Head CT 07/27/2022    FINDINGS:  There is no acute intracranial hemorrhage, hydrocephalus, midline shift or mass effect. Generalized cerebral and cerebellar volume loss.  There is periventricular white matter hypoattenuation, nonspecific but likely reflecting sequela of chronic microvascular ischemic change.  Gray-white matter differentiation is otherwise maintained.  The basal cisterns are patent.  The visualized paranasal sinuses and mastoid air cells are clear of acute process.  The visualized bones of the calvarium demonstrate no acute osseous abnormality.                                       Medications   multivitamin tablet (has no administration in time range)   thiamine tablet 100 mg (has no administration in time range)   calcium gluconate 1 g in NS IVPB (premixed) (has no administration in time range)   sodium chloride 0.9% bolus 1,000 mL 1,000 mL (0 mLs Intravenous Stopped 1/22/23 0213)   LORazepam tablet 1 mg (1 mg Oral Given 1/22/23 0056)     Medical Decision Making:   History:   Old Medical Records:  I decided to obtain old medical records.  Initial Assessment:     62-year-old male presents following episode of seizure-like activity.  History was obtained from EMS.  Per EMS the family reported seeing generalized tonic-clonic movement.  Patient received no medication during EMS transport.  On arrival patient appeared slightly confused which does along with possible postictal state.  Patient did have tongue body marks in the left lateral tongue.  Differential includes syncope versus alcohol withdrawal seizure versus CVA.  Patient appears to have no deficit other than mild confusion upon arrival which did improve throughout ER stay.  Recommendation is for observation for MRI brain and monitoring for possible alcohol withdrawal.  Patient given single p.o. Ativan dose to prevent further withdrawal complication.  Differential Diagnosis:     Alcohol withdrawal seizure, CVA, syncope  Clinical Tests:   Lab Tests: Ordered and Reviewed  Radiological Study: Ordered and Reviewed  ED Management:    Please see workup for ECG interpretation.  Imaging independently reviewed.  Agree with radiologist's interpretation.  No ICH or mass on CT head.  All labs reviewed and considered in the patient's differential diagnosis.  No leukocytosis.  Mild hypocalcemia.  Prior records were evaluated and considered a differential diagnosis.    Plan was discussed with the patient.  This includes labs, plan, imaging.          Scribe Attestation:   Scribe #1: I performed the above scribed service and the documentation accurately describes the services I performed. I attest to the accuracy of the note.      ED Course as of 01/22/23 0250   Sun Jan 22, 2023   0152 Patient is awake and appears to be more oriented at this time.  Patient remembers that he was here for possible seizure-like activity.  Patient denies any complaints at this time.  No facial asymmetry.  Full range of motion all extremities.  No dysarthria. [JM]   0156 ECG time 12:04 a.m.      Rate 76, sinus, regular rhythm, normal axis.   QRS 78 .  No ST elevation or depression no T-wave inversion no Q-waves present     Normal sinus rhythm with single PAC.   [JM]      ED Course User Index  [JM] Antonio Hdz MD                   Clinical Impression:   Final diagnoses:  [R56.9] Seizure-like activity (Primary)  [F10.239] Alcohol dependence with withdrawal with complication  [R56.9] Seizure  [R50.9] Fever        ED Disposition Condition    Observation Stable        I, Antonio Hdz, personally performed the services described in this documentation. All medical record entries made by the scribe were at my direction and in my presence. I have reviewed the chart and agree that the record reflects my personal performance and is accurate and complete.          Antonio Hdz MD  01/22/23 0259

## 2023-01-22 NOTE — PLAN OF CARE
Pt participated in BSE and cognitive-communication evaluation 1/22/23. Pt is safe to remain on regular diet with thin liquids, given adherence to standard aspiration precautions. Pt presents with mild cognitive deficits, such as disorientation and deficits in STM. Pt goals added to address deficits.

## 2023-01-22 NOTE — PT/OT/SLP EVAL
Speech Language Pathology Evaluation  Cognitive/Bedside Swallow    Patient Name:  Paty Jiménez   MRN:  [id number]  Admitting Diagnosis: Seizure-like activity    Recommendations:                  General Recommendations:  Cognitive-linguistic therapy  Diet recommendations:  Regular, Thin   Aspiration Precautions: Standard aspiration precautions   General Precautions: Standard,    Communication strategies:  none    History:     No past medical history on file.    No past surgical history on file.    Social History: Patient lives at home with his niece, he has two sons and two grandchildren.    Prior Intubation HX:  no prior intubation hx during this admission    Modified Barium Swallow: not indicated, no MBSS on file     Chest X-Rays: x-ray not completed during this admission    Prior diet: regular diet with thin liquids.    Occupation/hobbies/homemaking: pt stated he enjoys watching TV, playing pool and hanging out with friends and family.    Subjective     Pt was awake and alert with his brother at bedside upon SLP arrival. Pt had his food tray at bedside and stated his brother also brought him a hamburger. Pt denied difficulty with eating or communication, but was open to an evaluation.     Patient goals: Pt did not state any goals related to speech, language, or cognition.      Pain/Comfort:  Pain Rating 1: 0/10    Respiratory Status: Room air    Objective:     Cognitive Status:    Arousal/Alertness Appropriate response to stimuli  Attention No obvious deficits observed    Orientation Person, Place, and Situation  Memory Immediate Recall 2/5     Pt not oriented to time. He could not recall day, month or year. He was able to select president given a choice of 3.     Receptive Language:   Comprehension:      WFL    Pragmatics:    WFL    Expressive Language:  Verbal:    Repetition Words 100% and Sentences 100%  Naming Confrontation 100%  Sentence formulation WNL  Conversational speech WNL        Motor  Speech:  WFL    Voice:   WFL    Visual-Spatial:  WFL        Oral Musculature Evaluation  Oral Musculature: WNL  Dentition: scattered dentition, teeth in poor condition  Secretion Management: adequate  Mucosal Quality: good  Mandibular Strength and Mobility: WNL  Oral Labial Strength and Mobility: WNL  Lingual Strength and Mobility: WNL  Velar Elevation: WNL  Buccal Strength and Mobility: WNL  Volitional Cough: strong, clear  Volitional Swallow: WNL  Voice Prior to PO Intake: WNL for age and gender    Bedside Swallow Eval:   Consistencies Assessed:  Thin liquids water via straw sips  Puree apple sauce  Mixed consistencies peas and mashed potatos  Solids rukhsana cracker      Oral Phase:   WNL    Pharyngeal Phase:   no overt clinical signs/symptoms of aspiration  no overt clinical signs/symptoms of pharyngeal dysphagia    Compensatory Strategies  None      Assessment:     Paty Jiménez is a 62 y.o. male with an SLP diagnosis of Cognitive-Linguistic Impairment.  He presents with mild cognitive deficits, such as disorientation and deficits with short-term memory. Pt presents with swallowing skills within normal limits.     Goals:   Multidisciplinary Problems       SLP Goals          Problem: SLP    Goal Priority Disciplines Outcome   SLP Goal     SLP Ongoing, Progressing   Description: 1. Pt to participate in BSE to determine safest and least restrictive diet.   2. Pt to participate in cognitive-communication evaluation.   3. Pt will complete orientation tasks with 75% accuracy utilizing cues, such as the information board.   4. Pt will participate in STM tasks given Ciera with 75% accuracy.                        Plan:     Patient to be seen:  2 x/week   Plan of Care expires:     Plan of Care reviewed with:  patient, sibling   SLP Follow-Up:  Yes       Discharge recommendations:      Barriers to Discharge:  None    Time Tracking:     SLP Treatment Date:   01/22/23  Speech Start Time:  1400  Speech Stop Time:  1430      Speech Total Time (min):  30 min    Billable Minutes: Eval 15  and Eval Swallow and Oral Function 15    01/22/2023

## 2023-01-22 NOTE — ED NOTES
Returned from CT, tolerated well.  Pt more alert, and is cooperative, still GCS 14, disoriented to time. Seizure precautions in place.

## 2023-01-22 NOTE — SUBJECTIVE & OBJECTIVE
No past medical history on file.    No past surgical history on file.    Review of patient's allergies indicates:  No Known Allergies    No current facility-administered medications on file prior to encounter.     Current Outpatient Medications on File Prior to Encounter   Medication Sig    docusate sodium (COLACE) 100 MG capsule Take 1 capsule (100 mg total) by mouth 2 (two) times daily as needed for Constipation. Take 1-2 times a day while taking iron to prevent or treat constipation.    ferrous sulfate 325 (65 FE) MG EC tablet Take 1 tablet (325 mg total) by mouth once daily.     Family History       Problem Relation (Age of Onset)    Cancer Mother, Father          Tobacco Use    Smoking status: Every Day     Types: Cigarettes    Smokeless tobacco: Not on file   Substance and Sexual Activity    Alcohol use: Yes     Comment: 3 quarts of beer daily    Drug use: Not Currently    Sexual activity: Not on file     Review of Systems   Constitutional:  Negative for chills and fever.   HENT:  Negative for congestion.    Eyes:  Negative for visual disturbance.   Respiratory:  Negative for shortness of breath.    Cardiovascular:  Negative for chest pain.   Gastrointestinal:  Negative for abdominal pain, constipation, nausea and vomiting.   Genitourinary:  Negative for difficulty urinating.   Musculoskeletal:  Negative for arthralgias and myalgias.   Skin:  Positive for wound.   Neurological:  Positive for seizures.   Psychiatric/Behavioral:  Positive for confusion.    Objective:     Vital Signs (Most Recent):  Temp: (!) 100.8 °F (38.2 °C) (01/22/23 0246)  Pulse: 76 (01/22/23 0232)  Resp: 20 (01/22/23 0232)  BP: (!) 173/79 (01/22/23 0232)  SpO2: 99 % (01/22/23 0232)   Vital Signs (24h Range):  Temp:  [98.6 °F (37 °C)-100.8 °F (38.2 °C)] 100.8 °F (38.2 °C)  Pulse:  [76-90] 76  Resp:  [18-20] 20  SpO2:  [98 %-99 %] 99 %  BP: (163-200)/(71-96) 173/79     Weight: 68 kg (150 lb)  Body mass index is 25.75 kg/m².    Physical  Exam  HENT:      Head: Normocephalic and atraumatic.      Nose: No congestion or rhinorrhea.      Mouth/Throat:      Mouth: Mucous membranes are moist.   Cardiovascular:      Rate and Rhythm: Normal rate.   Pulmonary:      Effort: No respiratory distress.   Abdominal:      General: Bowel sounds are normal.      Palpations: Abdomen is soft.   Musculoskeletal:         General: No deformity.      Cervical back: Normal range of motion and neck supple.   Skin:     General: Skin is warm.      Comments: Bite wound to tongue    Neurological:      Mental Status: He is alert.      Comments: Confused. Oriented to person and location, but not time or situation.    Psychiatric:         Mood and Affect: Mood normal.           Significant Labs: All pertinent labs within the past 24 hours have been reviewed.    Significant Imaging: I have reviewed all pertinent imaging results/findings within the past 24 hours.

## 2023-01-22 NOTE — HPI
"62 y.o. male with a past medical history of Alcohol Abuse, who presents to the ED with seizure like activity onset 1.5 hours ago. Per EMS, patient was watching TV with family when he had a 3 minute witnessed seizure. Patient has no prior history of seizures. Per EMS, the patient had a CBG of 158 and was not oriented to person or time in the field. EMS denies administering any medications en route to the ED. No exacerbating or alleviating factors. Patient currently oriented to person and place. Patient endorses EtOH use with beer and states that he drinks "every day." Patient denies headache, chest pain, nausea, abdominal pain, neck pain, or other associated symptoms. Patient is a poor historian and is unaccompanied at bedside.  Differentials include alcohol withdrawal, seizure, CVA, syncope.  Work up revealed- seizure like symptoms currently resolved,  H/H stable, no leukocytosis, u/a negative, troponin WNL, calcium 7.1, serum alcohol WNL, CT of head without acute abnormality, EKG no ischemic changes.  Patient being admitted to to hospital medicine observation unit for further medical management   "

## 2023-01-22 NOTE — H&P
"[facility] Medicine  History & Physical    Patient Name: Paty Jiménez  MRN: [id number]  Patient Class: OP- Observation  Admission Date: 1/21/2023  Attending Physician: Shania Mejia MD   Primary Care Provider: To Obtain Unable         Patient information was obtained from patient, EMS personnel, law enforcement and ER records.     Subjective:     Principal Problem:Seizure-like activity    Chief Complaint:   Chief Complaint   Patient presents with    Seizures     Witnessed seizure at 2225 lasting approx 3 minutes. No known hx of seizure. Bystanders could not provide any medical hx info. Pt with GCS of 14 at this time.         HPI: 62 y.o. male with a past medical history of Alcohol Abuse, who presents to the ED with seizure like activity onset 1.5 hours ago. Per EMS, patient was watching TV with family when he had a 3 minute witnessed seizure. Patient has no prior history of seizures. Per EMS, the patient had a CBG of 158 and was not oriented to person or time in the field. EMS denies administering any medications en route to the ED. No exacerbating or alleviating factors. Patient currently oriented to person and place. Patient endorses EtOH use with beer and states that he drinks "every day." Patient denies headache, chest pain, nausea, abdominal pain, neck pain, or other associated symptoms. Patient is a poor historian and is unaccompanied at bedside.  Differentials include alcohol withdrawal, seizure, CVA, syncope.  Work up revealed- seizure like symptoms currently resolved,  H/H stable, no leukocytosis, u/a negative, troponin WNL, calcium 7.1, serum alcohol WNL, CT of head without acute abnormality, EKG no ischemic changes.  Patient being admitted to to [facility] medicine observation unit for further medical management       No past medical history on file.    No past surgical history on file.    Review of patient's allergies indicates:  No Known Allergies    No current " facility-administered medications on file prior to encounter.     Current Outpatient Medications on File Prior to Encounter   Medication Sig    docusate sodium (COLACE) 100 MG capsule Take 1 capsule (100 mg total) by mouth 2 (two) times daily as needed for Constipation. Take 1-2 times a day while taking iron to prevent or treat constipation.    ferrous sulfate 325 (65 FE) MG EC tablet Take 1 tablet (325 mg total) by mouth once daily.     Family History       Problem Relation (Age of Onset)    Cancer Mother, Father          Tobacco Use    Smoking status: Every Day     Types: Cigarettes    Smokeless tobacco: Not on file   Substance and Sexual Activity    Alcohol use: Yes     Comment: 3 quarts of beer daily    Drug use: Not Currently    Sexual activity: Not on file     Review of Systems   Constitutional:  Negative for chills and fever.   HENT:  Negative for congestion.    Eyes:  Negative for visual disturbance.   Respiratory:  Negative for shortness of breath.    Cardiovascular:  Negative for chest pain.   Gastrointestinal:  Negative for abdominal pain, constipation, nausea and vomiting.   Genitourinary:  Negative for difficulty urinating.   Musculoskeletal:  Negative for arthralgias and myalgias.   Skin:  Positive for wound.   Neurological:  Positive for seizures.   Psychiatric/Behavioral:  Positive for confusion.    Objective:     Vital Signs (Most Recent):  Temp: (!) 100.8 °F (38.2 °C) (01/22/23 0246)  Pulse: 76 (01/22/23 0232)  Resp: 20 (01/22/23 0232)  BP: (!) 173/79 (01/22/23 0232)  SpO2: 99 % (01/22/23 0232)   Vital Signs (24h Range):  Temp:  [98.6 °F (37 °C)-100.8 °F (38.2 °C)] 100.8 °F (38.2 °C)  Pulse:  [76-90] 76  Resp:  [18-20] 20  SpO2:  [98 %-99 %] 99 %  BP: (163-200)/(71-96) 173/79     Weight: 68 kg (150 lb)  Body mass index is 25.75 kg/m².    Physical Exam  HENT:      Head: Normocephalic and atraumatic.      Nose: No congestion or rhinorrhea.      Mouth/Throat:      Mouth: Mucous membranes are  moist.   Cardiovascular:      Rate and Rhythm: Normal rate.   Pulmonary:      Effort: No respiratory distress.   Abdominal:      General: Bowel sounds are normal.      Palpations: Abdomen is soft.   Musculoskeletal:         General: No deformity.      Cervical back: Normal range of motion and neck supple.   Skin:     General: Skin is warm.      Comments: Bite wound to tongue    Neurological:      Mental Status: He is alert.      Comments: Confused. Oriented to person and location, but not time or situation.    Psychiatric:         Mood and Affect: Mood normal.           Significant Labs: All pertinent labs within the past 24 hours have been reviewed.    Significant Imaging: I have reviewed all pertinent imaging results/findings within the past 24 hours.    Assessment/Plan:     * Seizure-like activity  New onset seizure, vs stroke, vs synocope  Symptoms currently resolved  -Continuous cardiac monitoring   -Troponin WNL  -CT of head negative   -MRI/MRA pending  -u/a negative  B12, folate, rpr pending   -2D echo pending  -Neuro checks Q4hrs  -PT/OT/SP evaluation  Consult neurology           Hypocalcemia  Replete   Monitor       Tobacco use disorder  Counseled on smoking cessation for 5 minutes.      Alcohol abuse  Serum alcohol WNL  Monitor labs  CIWA q4  Ativan prn         VTE Risk Mitigation (From admission, onward)         Ordered     Place sequential compression device  Until discontinued         01/22/23 0258                   Sandy Jiménez NP  Department of Hospital Medicine   [facility] [place] [facility]

## 2023-01-22 NOTE — PHARMACY MED REC
"Admission Medication History     The home medication history was taken by Cecille Guo.    You may go to "Admission" then "Reconcile Home Medications" tabs to review and/or act upon these items.     The home medication list has been updated by the Pharmacy department.   Please read ALL comments highlighted in yellow.   Please address this information as you see fit.    Feel free to contact us if you have any questions or require assistance.    Medications listed below were obtained from: Patient/family and Analytic software- [employer]    The medication reconciliation was completed by the patient's bedside.  Patient reports no history of taking medication .    Cecille Guo  [phone]                    .        "

## 2023-01-22 NOTE — ED NOTES
Pt was at a family members home when he had a witnessed seizure described as tonic clonic that lasted approximately 3 min.  No trauma.  + urinary incontinence.  Unknown if history of seizures or ETOH abuse.  Pt presents drowsy, oriented to person and place.  Follows commands.  Pt gets frustrated and raises voice when asked questions and does not answer all questions.  Resp even and unlabored, skin warm and dry.  HOB elevated, SR up x 2, call bell within reach.  Seizure pads on bed and suction at HOB.

## 2023-01-22 NOTE — ASSESSMENT & PLAN NOTE
New onset seizure, vs stroke, vs synocope  Symptoms currently resolved  -Continuous cardiac monitoring   -Troponin WNL  -CT of head negative   -MRI/MRA pending  -u/a negative  B12, folate, rpr pending   -2D echo pending  -Neuro checks Q4hrs  -PT/OT/SP evaluation  Consult neurology

## 2023-01-23 PROBLEM — I10 HYPERTENSION: Status: ACTIVE | Noted: 2023-01-23

## 2023-01-23 LAB
ALBUMIN SERPL BCP-MCNC: 3.2 G/DL (ref 3.5–5.2)
ALP SERPL-CCNC: 98 U/L (ref 55–135)
ALT SERPL W/O P-5'-P-CCNC: 30 U/L (ref 10–44)
ANION GAP SERPL CALC-SCNC: 9 MMOL/L (ref 8–16)
AST SERPL-CCNC: 72 U/L (ref 10–40)
BASOPHILS # BLD AUTO: 0.03 K/UL (ref 0–0.2)
BASOPHILS NFR BLD: 0.5 % (ref 0–1.9)
BILIRUB SERPL-MCNC: 1 MG/DL (ref 0.1–1)
BUN SERPL-MCNC: 14 MG/DL (ref 8–23)
CALCIUM SERPL-MCNC: 8.9 MG/DL (ref 8.7–10.5)
CHLORIDE SERPL-SCNC: 102 MMOL/L (ref 95–110)
CO2 SERPL-SCNC: 24 MMOL/L (ref 23–29)
CREAT SERPL-MCNC: 0.8 MG/DL (ref 0.5–1.4)
DIFFERENTIAL METHOD: ABNORMAL
EOSINOPHIL # BLD AUTO: 0.1 K/UL (ref 0–0.5)
EOSINOPHIL NFR BLD: 1.1 % (ref 0–8)
ERYTHROCYTE [DISTWIDTH] IN BLOOD BY AUTOMATED COUNT: 14.1 % (ref 11.5–14.5)
EST. GFR  (NO RACE VARIABLE): >60 ML/MIN/1.73 M^2
ESTIMATED AVG GLUCOSE: 103 MG/DL (ref 68–131)
GLUCOSE SERPL-MCNC: 83 MG/DL (ref 70–110)
HBA1C MFR BLD: 5.2 % (ref 4–5.6)
HCT VFR BLD AUTO: 32.1 % (ref 40–54)
HGB BLD-MCNC: 11.1 G/DL (ref 14–18)
IMM GRANULOCYTES # BLD AUTO: 0.01 K/UL (ref 0–0.04)
IMM GRANULOCYTES NFR BLD AUTO: 0.2 % (ref 0–0.5)
LYMPHOCYTES # BLD AUTO: 2.2 K/UL (ref 1–4.8)
LYMPHOCYTES NFR BLD: 39.6 % (ref 18–48)
MCH RBC QN AUTO: 30.4 PG (ref 27–31)
MCHC RBC AUTO-ENTMCNC: 34.6 G/DL (ref 32–36)
MCV RBC AUTO: 88 FL (ref 82–98)
MONOCYTES # BLD AUTO: 0.7 K/UL (ref 0.3–1)
MONOCYTES NFR BLD: 13.1 % (ref 4–15)
NEUTROPHILS # BLD AUTO: 2.6 K/UL (ref 1.8–7.7)
NEUTROPHILS NFR BLD: 45.5 % (ref 38–73)
NRBC BLD-RTO: 0 /100 WBC
PLATELET # BLD AUTO: 136 K/UL (ref 150–450)
PMV BLD AUTO: 9.6 FL (ref 9.2–12.9)
POTASSIUM SERPL-SCNC: 3.8 MMOL/L (ref 3.5–5.1)
PROT SERPL-MCNC: 7.2 G/DL (ref 6–8.4)
RBC # BLD AUTO: 3.65 M/UL (ref 4.6–6.2)
RPR SER QL: NORMAL
SODIUM SERPL-SCNC: 135 MMOL/L (ref 136–145)
WBC # BLD AUTO: 5.65 K/UL (ref 3.9–12.7)

## 2023-01-23 PROCEDURE — 97165 OT EVAL LOW COMPLEX 30 MIN: CPT

## 2023-01-23 PROCEDURE — G0378 HOSPITAL OBSERVATION PER HR: HCPCS

## 2023-01-23 PROCEDURE — 99222 1ST HOSP IP/OBS MODERATE 55: CPT | Mod: ,,, | Performed by: NEUROLOGICAL SURGERY

## 2023-01-23 PROCEDURE — 97161 PT EVAL LOW COMPLEX 20 MIN: CPT

## 2023-01-23 PROCEDURE — 25000003 PHARM REV CODE 250: Performed by: EMERGENCY MEDICINE

## 2023-01-23 PROCEDURE — 99222 PR INITIAL HOSPITAL CARE,LEVL II: ICD-10-PCS | Mod: ,,, | Performed by: NEUROLOGICAL SURGERY

## 2023-01-23 PROCEDURE — 85025 COMPLETE CBC W/AUTO DIFF WBC: CPT | Performed by: NURSE PRACTITIONER

## 2023-01-23 PROCEDURE — 25000003 PHARM REV CODE 250: Performed by: STUDENT IN AN ORGANIZED HEALTH CARE EDUCATION/TRAINING PROGRAM

## 2023-01-23 PROCEDURE — 80053 COMPREHEN METABOLIC PANEL: CPT | Performed by: NURSE PRACTITIONER

## 2023-01-23 RX ORDER — AMLODIPINE BESYLATE 5 MG/1
10 TABLET ORAL DAILY
Status: DISCONTINUED | OUTPATIENT
Start: 2023-01-23 | End: 2023-01-24 | Stop reason: HOSPADM

## 2023-01-23 RX ADMIN — THERA TABS 1 TABLET: TAB at 08:01

## 2023-01-23 RX ADMIN — AMLODIPINE BESYLATE 10 MG: 5 TABLET ORAL at 12:01

## 2023-01-23 RX ADMIN — THIAMINE HCL TAB 100 MG 100 MG: 100 TAB at 08:01

## 2023-01-23 NOTE — PT/OT/SLP EVAL
Occupational Therapy   Evaluation and Discharge Note    Name: Paty Jiménez  MRN: [id number]  Admitting Diagnosis: Seizure-like activity  Recent Surgery: * No surgery found *      Recommendations:     Discharge Recommendations: home  Discharge Equipment Recommendations: none  Barriers to discharge:  None    Assessment:     Paty Jiménez is a 62 y.o. male with a medical diagnosis of Seizure-like activity. At this time, patient is functioning at their prior level of function and does not require further acute OT services.     Plan:     During this hospitalization, patient does not require further acute OT services.  Please re-consult if situation changes.    Plan of Care Reviewed with: patient    Subjective     Chief Complaint: none  Patient/Family Comments/goals: wants to stay until his daughter from [place] comes    Occupational Profile:  Living Environment: lives with his niece in a house, no concerns  Previous level of function: (I) self care and amb, no AD  Roles and Routines: rides his bike everywhere  Equipment Used at home: none  Assistance upon Discharge: niece, daughter    Pain/Comfort:  Pain Rating 1: 0/10    Patients cultural, spiritual, Caodaism conflicts given the current situation: no    Objective:     Communicated with: nurse prior to session.  Patient found HOB elevated with peripheral IV upon OT entry to room.    General Precautions: Standard,    Orthopedic Precautions: N/A  Braces: N/A  Respiratory Status: Room air     Occupational Performance:    Bed Mobility:    Patient completed Scooting/Bridging with modified independence  Patient completed Supine to Sit with modified independence    Functional Mobility/Transfers:  Patient completed Sit <> Stand Transfer with independence  with  no assistive device   Functional Mobility: The patient amb in the hallway with PT, (I), no AD    Activities of Daily Living:  Upper Body Dressing: independence    Lower Body Dressing: independence       Cognitive/Visual Perceptual:  Cognitive/Psychosocial Skills:     -       Oriented to: Person, Place, Time, and Situation   -       Follows Commands/attention:Follows multistep  commands  -       Communication: clear/fluent  -       Memory: No Deficits noted  -       Safety awareness/insight to disability: intact   -       Mood/Affect/Coping skills/emotional control: Appropriate to situation  Visual/Perceptual:      -Intact      Physical Exam:  Balance: -       good  Postural examination/scapula alignment:    -       No postural abnormalities identified  Skin integrity: Visible skin intact  Edema:  None noted  Sensation:    -       Intact  Upper Extremity Range of Motion:     -       Right Upper Extremity: WFL  -       Left Upper Extremity: WFL  Upper Extremity Strength:    -       Right Upper Extremity: WFL  -       Left Upper Extremity: WFL    AMPAC 6 Click ADL:  AMPAC Total Score: 24    Treatment & Education:  The patient participated in OT eval and encouraged the patient to sit in the chair.    Patient left up in chair with all lines intact and call button in reach    GOALS:   Multidisciplinary Problems       Occupational Therapy Goals       Not on file              Multidisciplinary Problems (Resolved)          Problem: Occupational Therapy    Goal Priority Disciplines Outcome Interventions   Occupational Therapy Goal   (Resolved)     OT, PT/OT Met                        History:     No past medical history on file.    No past surgical history on file.    Time Tracking:     OT Date of Treatment: 01/23/23  OT Start Time: 1411  OT Stop Time: 1419  OT Total Time (min): 8 min    Billable Minutes:Evaluation 8 (with PT)    1/23/2023

## 2023-01-23 NOTE — SUBJECTIVE & OBJECTIVE
Interval History: No events overnight.    Review of Systems   Constitutional:  Negative for chills and fever.   Respiratory:  Negative for cough and shortness of breath.    Cardiovascular:  Negative for chest pain and leg swelling.   Gastrointestinal:  Negative for abdominal distention and abdominal pain.   Objective:     Vital Signs (Most Recent):  Temp: 98.2 °F (36.8 °C) (01/23/23 0721)  Pulse: 65 (01/23/23 0721)  Resp: 20 (01/23/23 0721)  BP: (!) 184/88 (nurse notify) (01/23/23 0721)  SpO2: 99 % (01/23/23 0721)   Vital Signs (24h Range):  Temp:  [98.1 °F (36.7 °C)-98.9 °F (37.2 °C)] 98.2 °F (36.8 °C)  Pulse:  [60-89] 65  Resp:  [13-20] 20  SpO2:  [97 %-100 %] 99 %  BP: (125-185)/(62-88) 184/88     Weight: 60.9 kg (134 lb 4.2 oz)  Body mass index is 21.67 kg/m².    Intake/Output Summary (Last 24 hours) at 1/23/2023 1020  Last data filed at 1/23/2023 0920  Gross per 24 hour   Intake 120 ml   Output --   Net 120 ml      Physical Exam  Constitutional:       General: He is not in acute distress.     Appearance: He is not ill-appearing, toxic-appearing or diaphoretic.   Cardiovascular:      Rate and Rhythm: Normal rate and regular rhythm.      Heart sounds: No murmur heard.    No gallop.   Pulmonary:      Effort: No respiratory distress.      Breath sounds: Normal breath sounds. No wheezing or rales.   Abdominal:      General: Bowel sounds are normal. There is no distension.      Palpations: Abdomen is soft.      Tenderness: There is no abdominal tenderness.   Neurological:      Comments: No focal neuro deficits       Significant Labs: All pertinent labs within the past 24 hours have been reviewed.    Significant Imaging: I have reviewed all pertinent imaging results/findings within the past 24 hours.

## 2023-01-23 NOTE — NURSING
Nurses Note -- 4 Eyes      1/23/2023   6:38 AM      Skin assessed during: Admit      [x] No Pressure Injuries Present    []Prevention Measures Documented      [] Yes- Altered Skin Integrity Present or Discovered   [] LDA Added if Not in Epic (Describe Wound)   [] New Altered Skin Integrity was Present on Admit and Documented in LDA   [] Wound Image Taken    Wound Care Consulted? No    Attending Nurse:  Tiana Shannon RN     Second RN/Staff Member: Krishna HOLBROOK

## 2023-01-23 NOTE — NURSING
Received report from Katie @0115 in ED that patient will be arriving from ED, awaiting transport from ED.

## 2023-01-23 NOTE — PT/OT/SLP PROGRESS
Physical Therapy      Patient Name:  Paty Jiménez   MRN:  [id number]    Patient not seen at this for PT eval secondary to Testing/imaging (MRI). Will follow-up.

## 2023-01-23 NOTE — PLAN OF CARE
01/23/23 1404   Discharge Planning   Assessment Type Discharge Planning Brief Assessment   Resource/Environmental Concerns none   Support Systems Family members   Equipment Currently Used at Home none   Current Living Arrangements home   Patient/Family Anticipates Transition to home   Patient/Family Anticipated Services at Transition none   DME Needed Upon Discharge  none   Discharge Plan A Home with family  (with [facility] information)       [id number] [place] [address] [facility] [place] [address] [address] [address]  Phone: [phone] Fax: [phone]

## 2023-01-23 NOTE — PT/OT/SLP PROGRESS
Occupational Therapy      Patient Name:  Paty Jiménez   MRN:  [id number]    Patient not seen today secondary to Testing/imaging (xray/CT/MRI). Will follow-up later.    1/23/2023

## 2023-01-23 NOTE — PLAN OF CARE
Problem: Occupational Therapy  Goal: Occupational Therapy Goal  Outcome: Met     OT eval is complete. The patient is at his functional baseline and no OT is recommended.      The patient is ok for D/C to home from OT standpoint. No OT or DME needs.

## 2023-01-23 NOTE — PROGRESS NOTES
"[facility] Medicine  Progress Note    Patient Name: Paty Jiménez  MRN: [id number]  Patient Class: OP- Observation   Admission Date: 1/21/2023  Length of Stay: 0 days  Attending Physician: Sriram Zambrano MD  Primary Care Provider: To Obtain Unable        Subjective:     Principal Problem:Seizure-like activity        HPI:  62 y.o. male with a past medical history of Alcohol Abuse, who presents to the ED with seizure like activity onset 1.5 hours ago. Per EMS, patient was watching TV with family when he had a 3 minute witnessed seizure. Patient has no prior history of seizures. Per EMS, the patient had a CBG of 158 and was not oriented to person or time in the field. EMS denies administering any medications en route to the ED. No exacerbating or alleviating factors. Patient currently oriented to person and place. Patient endorses EtOH use with beer and states that he drinks "every day." Patient denies headache, chest pain, nausea, abdominal pain, neck pain, or other associated symptoms. Patient is a poor historian and is unaccompanied at bedside.  Differentials include alcohol withdrawal, seizure, CVA, syncope.  Work up revealed- seizure like symptoms currently resolved,  H/H stable, no leukocytosis, u/a negative, troponin WNL, calcium 7.1, serum alcohol WNL, CT of head without acute abnormality, EKG no ischemic changes.  Patient being admitted to [facility] medicine observation unit for further medical management       Overview/Hospital Course:  62y M admitted for seizure. CT head without acute finding. C/f EtOH seizure. Neurology consulted.      Interval History: No events overnight.    Review of Systems   Constitutional:  Negative for chills and fever.   Respiratory:  Negative for cough and shortness of breath.    Cardiovascular:  Negative for chest pain and leg swelling.   Gastrointestinal:  Negative for abdominal distention and abdominal pain.   Objective:     Vital Signs (Most Recent):  Temp: " 98.2 °F (36.8 °C) (01/23/23 0721)  Pulse: 65 (01/23/23 0721)  Resp: 20 (01/23/23 0721)  BP: (!) 184/88 (nurse notify) (01/23/23 0721)  SpO2: 99 % (01/23/23 0721)   Vital Signs (24h Range):  Temp:  [98.1 °F (36.7 °C)-98.9 °F (37.2 °C)] 98.2 °F (36.8 °C)  Pulse:  [60-89] 65  Resp:  [13-20] 20  SpO2:  [97 %-100 %] 99 %  BP: (125-185)/(62-88) 184/88     Weight: 60.9 kg (134 lb 4.2 oz)  Body mass index is 21.67 kg/m².    Intake/Output Summary (Last 24 hours) at 1/23/2023 1020  Last data filed at 1/23/2023 0920  Gross per 24 hour   Intake 120 ml   Output --   Net 120 ml      Physical Exam  Constitutional:       General: He is not in acute distress.     Appearance: He is not ill-appearing, toxic-appearing or diaphoretic.   Cardiovascular:      Rate and Rhythm: Normal rate and regular rhythm.      Heart sounds: No murmur heard.    No gallop.   Pulmonary:      Effort: No respiratory distress.      Breath sounds: Normal breath sounds. No wheezing or rales.   Abdominal:      General: Bowel sounds are normal. There is no distension.      Palpations: Abdomen is soft.      Tenderness: There is no abdominal tenderness.   Neurological:      Comments: No focal neuro deficits       Significant Labs: All pertinent labs within the past 24 hours have been reviewed.    Significant Imaging: I have reviewed all pertinent imaging results/findings within the past 24 hours.      Assessment/Plan:      * Seizure-like activity  Witnessed seizure-like activity prior to admission. No prior episodes. Significant EtOH use.   - MRI pending  - MRA ordered by admitting physician pending  - neurology consulted  - seizure precautions  - see alcohol abuse        Hypertension  Start amlodipine      Hypocalcemia  Replete   Monitor       Tobacco use disorder  Counseled on smoking cessation for 5 minutes.      Alcohol abuse  Serum alcohol WNL  Monitor labs  CIWA q4  Ativan prn         VTE Risk Mitigation (From admission, onward)         Ordered     Place  sequential compression device  Until discontinued         01/22/23 0258                Discharge Planning   BROOKE:      Code Status: Full Code   Is the patient medically ready for discharge?:     Reason for patient still in hospital (select all that apply): Patient trending condition, Laboratory test, Treatment, Consult recommendations and Pending disposition                     Sriram Zambrano MD  Department of Hospital Medicine   [facility]

## 2023-01-23 NOTE — PLAN OF CARE
Problem: Physical Therapy  Goal: Physical Therapy Goal  Outcome: Met     Pt ambulated ~400 ft independently in the hallway with no concerns.  Pt can be discharged home, no further therapy services needed.

## 2023-01-23 NOTE — PT/OT/SLP EVAL
Physical Therapy Evaluation and Discharge Note    Patient Name:  Paty Jiménez   MRN:  [id number]    Recommendations:     Discharge Recommendations: home  Discharge Equipment Recommendations: none   Barriers to discharge: None    Assessment:     Paty Jiménez is a 62 y.o. male admitted with a medical diagnosis of Seizure-like activity.  At this time, patient is functioning at their prior level of function and does not require further acute PT services.     Recent Surgery: * No surgery found *      Plan:     During this hospitalization, patient does not require further acute PT services.  Please re-consult if situation changes.      Subjective     Chief Complaint: N/A  Patient/Family Comments/goals: Pt agreeable to PT eval.   Pain/Comfort:  Pain Rating 1: 0/10      Living Environment:  Pt lives with niece in a [facility] with no concerns.   Prior to admission, patients level of function was independent.  Pt doesn't have a car, he rides his bicycle everywhere.  Equipment used at home: none.  Upon discharge, patient will have assistance from family (brother/sisters/2 sons/2 grandchildren/niece).    Objective:     Patient found HOB elevated with peripheral IV upon PT entry to room.    General Precautions: Standard  Orthopedic Precautions:N/A   Braces: N/A  Respiratory Status: Room air    Exams:  Cognitive Exam:  Patient was able to follow multiple commands.   Gross Motor Coordination:  WFL  Postural Exam:  Patient presented with the following abnormalities:    -       No postural abnormalities identified  Sensation:    -       Intact  light/touch BLE  Skin Integrity/Edema:      -       Skin integrity: Visible skin intact  -       Edema: None noted BLE  BLE ROM: WNL  BLE Strength: WNL    Functional Mobility:  Bed Mobility:     Scooting: independence  Supine to Sit: modified independence with HOB elevated   Transfers:     Sit to Stand:  independence with no AD  Bed to Chair: independence with  no AD  using  Step Transfer  Gait: Pt  ambulated ~400 ft independently with his tennis shoes in the hallway with no gait deviations.   Balance: Pt with fair+ dynamic standing balance.     AM-PAC 6 CLICK MOBILITY  Total Score:24       Patient left up in chair reclined with all lines intact and call button in reach.    GOALS:   Multidisciplinary Problems       Physical Therapy Goals       Not on file              Multidisciplinary Problems (Resolved)          Problem: Physical Therapy    Goal Priority Disciplines Outcome Goal Variances Interventions   Physical Therapy Goal   (Resolved)     PT, PT/OT Met                         History:     No past medical history on file.    No past surgical history on file.    Time Tracking:     PT Received On: 01/23/23  PT Start Time: 1410     PT Stop Time: 1418  PT Total Time (min): 8 min     Billable Minutes: Evaluation 8 min co-eval with OT      01/23/2023

## 2023-01-23 NOTE — ED NOTES
Received report from LUIS Philip. Introduced self at bedside, pt is resting comfortably, denies any pain at this time. Side rails up x2, seizure pads in place, call bell within reach. Pt is alert, calm, and oriented x4, no acute distress noted.

## 2023-01-23 NOTE — ASSESSMENT & PLAN NOTE
Witnessed seizure-like activity prior to admission. No prior episodes. Significant EtOH use.   - MRI pending  - MRA ordered by admitting physician pending  - neurology consulted  - seizure precautions  - see alcohol abuse

## 2023-01-23 NOTE — HOSPITAL COURSE
62y M admitted for seizure. CT head without acute finding. C/f EtOH seizure. Neurology consulted who recommended continuing pt on keppra 250 mg bid for now. Pt feeling better in the morning without recurrent seizure activity. Pt d/c home with rx for valium and keppra. Pt advised to quit drinking with valium taper, but told to not take this if he is going to continue to drink as it could be sedating. Pt endorsed understanding. Pt given referral for outpatient neurology f/u on d/c.

## 2023-01-24 VITALS
BODY MASS INDEX: 21.57 KG/M2 | WEIGHT: 134.25 LBS | RESPIRATION RATE: 20 BRPM | SYSTOLIC BLOOD PRESSURE: 163 MMHG | DIASTOLIC BLOOD PRESSURE: 94 MMHG | OXYGEN SATURATION: 100 % | HEART RATE: 72 BPM | HEIGHT: 66 IN | TEMPERATURE: 98 F

## 2023-01-24 PROCEDURE — 25000003 PHARM REV CODE 250: Performed by: STUDENT IN AN ORGANIZED HEALTH CARE EDUCATION/TRAINING PROGRAM

## 2023-01-24 PROCEDURE — G0378 HOSPITAL OBSERVATION PER HR: HCPCS

## 2023-01-24 PROCEDURE — 25000003 PHARM REV CODE 250: Performed by: EMERGENCY MEDICINE

## 2023-01-24 RX ORDER — LEVETIRACETAM 250 MG/1
250 TABLET ORAL 2 TIMES DAILY
Qty: 60 TABLET | Refills: 11 | Status: ON HOLD | OUTPATIENT
Start: 2023-01-24 | End: 2023-05-14 | Stop reason: SDUPTHER

## 2023-01-24 RX ORDER — DIAZEPAM 5 MG/1
TABLET ORAL
Qty: 10 TABLET | Refills: 0 | Status: ON HOLD | OUTPATIENT
Start: 2023-01-24 | End: 2023-05-14 | Stop reason: HOSPADM

## 2023-01-24 RX ORDER — DIAZEPAM 5 MG/1
5 TABLET ORAL EVERY 6 HOURS
Status: DISCONTINUED | OUTPATIENT
Start: 2023-01-24 | End: 2023-01-24 | Stop reason: HOSPADM

## 2023-01-24 RX ADMIN — DIAZEPAM 5 MG: 5 TABLET ORAL at 08:01

## 2023-01-24 NOTE — PLAN OF CARE
01/24/23 0821   Final Note   Assessment Type Final Discharge Note   Anticipated Discharge Disposition Home   Hospital Resources/Appts/Education Provided Appointments scheduled and added to AVS   Post-Acute Status   Post-Acute Authorization Other   Other Status No Post-Acute Service Needs     Pts nurse Claudine notified she can d/c pt from CM standpoint once seen by Dr Ozuna

## 2023-01-24 NOTE — NURSING
Patient cleared for discharged for by  and Janet, case management. AVS/discharge instructions reviewed and given to patient. Patient verbalizes understanding of instructions.   Patient notified, LINKS documentation reflects vaccine administrations for 2 COVID doses plus booster at [id number].  Patient states he will verify.  Right arm PIV removed, pressure dressing placed.   Patient transported to outpatient pharmacy via wheelchair to retrieve ordered home medications  then to exit.

## 2023-01-24 NOTE — NURSING
Patient remained free of falls during shift. RR even and unlabored during shift. Medications given as ordered. Plan of care discussed with patient and son at bedside. Patient verbalizes understanding.  No distress noted. Will report to night nurse.

## 2023-01-24 NOTE — CONSULTS
[facility] Surg  Neurology  Consult Note    Patient Name: Paty Jiménez  MRN: [id number]  Admission Date: 1/21/2023  Hospital Length of Stay: 0 days  Code Status: Full Code   Attending Provider: Sriram Zambrano MD   Consulting Provider: Surya Vallejo MD  Primary Care Physician: To Obtain Unable  Principal Problem:Seizure-like activity    Inpatient consult to Neurology  Consult performed by: Surya Vallejo MD  Consult ordered by: Sandy Jiménez NP         Subjective:     Chief Complaint:  Seizure     HPI:   62 year old male with prior alcohol abuse admitted to the hospital for assessment after having a seizure. He was watching TV and has no recall of what happened other than he woke up an unknown period of time later in the hospital. Per chart review he had a 3 minute seizure and he has no prior seizure history. He currently drinks (beer) daily. He has been back to his baseline and denies any problems.        No past medical history on file.    No past surgical history on file.    Review of patient's allergies indicates:  No Known Allergies    Current Neurological Medications:     No current facility-administered medications on file prior to encounter.     No current outpatient medications on file prior to encounter.     Family History       Problem Relation (Age of Onset)    Cancer Mother, Father          Tobacco Use    Smoking status: Every Day     Types: Cigarettes    Smokeless tobacco: Not on file   Substance and Sexual Activity    Alcohol use: Yes     Comment: 3 quarts of beer daily    Drug use: Not Currently    Sexual activity: Not on file     Review of Systems   Constitutional:  Negative for activity change, fatigue and unexpected weight change.   HENT:  Negative for trouble swallowing and voice change.    Eyes:  Negative for photophobia, pain and visual disturbance.   Respiratory:  Negative for apnea and shortness of breath.    Cardiovascular:  Negative for chest pain and palpitations.    Gastrointestinal:  Negative for constipation, nausea and vomiting.   Genitourinary:  Negative for difficulty urinating.   Musculoskeletal:  Negative for arthralgias, back pain, gait problem, myalgias and neck pain.   Skin:  Negative for color change and rash.   Neurological:  Positive for seizures. Negative for dizziness, syncope, speech difficulty, weakness, light-headedness, numbness and headaches.   Psychiatric/Behavioral:  Negative for agitation, behavioral problems and confusion.    Objective:     Vital Signs (Most Recent):  Temp: 98.3 °F (36.8 °C) (01/23/23 1952)  Pulse: 91 (01/23/23 1952)  Resp: 19 (01/23/23 1952)  BP: (!) 144/75 (01/23/23 1952)  SpO2: 100 % (01/23/23 1952)   Vital Signs (24h Range):  Temp:  [98.1 °F (36.7 °C)-98.6 °F (37 °C)] 98.3 °F (36.8 °C)  Pulse:  [] 91  Resp:  [14-20] 19  SpO2:  [97 %-100 %] 100 %  BP: (114-184)/(75-88) 144/75     Weight: 60.9 kg (134 lb 4.2 oz)  Body mass index is 21.67 kg/m².    Physical Exam  Constitutional:       Appearance: He is well-developed.   HENT:      Head: Normocephalic and atraumatic.   Eyes:      Extraocular Movements: EOM normal.      Pupils: Pupils are equal, round, and reactive to light.   Pulmonary:      Effort: Pulmonary effort is normal. No respiratory distress.   Musculoskeletal:         General: Normal range of motion.   Neurological:      Mental Status: He is alert and oriented to person, place, and time.      Coordination: Finger-Nose-Finger Test normal.   Psychiatric:         Speech: Speech normal.         Behavior: Behavior normal.       NEUROLOGICAL EXAMINATION:     MENTAL STATUS   Oriented to person, place, and time.   Registration: recalls 3 of 3 objects.   Attention: normal. Concentration: normal.   Speech: speech is normal   Level of consciousness: alert    CRANIAL NERVES     CN II   Right visual field deficit: none  Left visual field deficit: none     CN III, IV, VI   Pupils are equal, round, and reactive to light.  Extraocular  motions are normal.   Right pupil: Size: 4 mm.   Left pupil: Size: 4 mm.     GAIT AND COORDINATION      Coordination   Finger to nose coordination: normal    Tremor   Resting tremor: absent    Significant Labs: All pertinent lab results from the past 24 hours have been reviewed.    Significant Imaging: I have reviewed all pertinent imaging results/findings within the past 24 hours.    Assessment and Plan:     * Seizure-like activity  High risk for recurrent seizures with alcohol abuse. Start low dose Keppra 250 mg BID    Alcohol abuse  Counseled on cessation        VTE Risk Mitigation (From admission, onward)         Ordered     Place sequential compression device  Until discontinued         01/22/23 0258                Thank you for your consult. I will follow-up with patient. Please contact us if you have any additional questions.    Surya Vallejo MD  Neurology  [facility]

## 2023-01-24 NOTE — SUBJECTIVE & OBJECTIVE
No past medical history on file.    No past surgical history on file.    Review of patient's allergies indicates:  No Known Allergies    Current Neurological Medications:     No current facility-administered medications on file prior to encounter.     No current outpatient medications on file prior to encounter.     Family History       Problem Relation (Age of Onset)    Cancer Mother, Father          Tobacco Use    Smoking status: Every Day     Types: Cigarettes    Smokeless tobacco: Not on file   Substance and Sexual Activity    Alcohol use: Yes     Comment: 3 quarts of beer daily    Drug use: Not Currently    Sexual activity: Not on file     Review of Systems   Constitutional:  Negative for activity change, fatigue and unexpected weight change.   HENT:  Negative for trouble swallowing and voice change.    Eyes:  Negative for photophobia, pain and visual disturbance.   Respiratory:  Negative for apnea and shortness of breath.    Cardiovascular:  Negative for chest pain and palpitations.   Gastrointestinal:  Negative for constipation, nausea and vomiting.   Genitourinary:  Negative for difficulty urinating.   Musculoskeletal:  Negative for arthralgias, back pain, gait problem, myalgias and neck pain.   Skin:  Negative for color change and rash.   Neurological:  Positive for seizures. Negative for dizziness, syncope, speech difficulty, weakness, light-headedness, numbness and headaches.   Psychiatric/Behavioral:  Negative for agitation, behavioral problems and confusion.    Objective:     Vital Signs (Most Recent):  Temp: 98.3 °F (36.8 °C) (01/23/23 1952)  Pulse: 91 (01/23/23 1952)  Resp: 19 (01/23/23 1952)  BP: (!) 144/75 (01/23/23 1952)  SpO2: 100 % (01/23/23 1952)   Vital Signs (24h Range):  Temp:  [98.1 °F (36.7 °C)-98.6 °F (37 °C)] 98.3 °F (36.8 °C)  Pulse:  [] 91  Resp:  [14-20] 19  SpO2:  [97 %-100 %] 100 %  BP: (114-184)/(75-88) 144/75     Weight: 60.9 kg (134 lb 4.2 oz)  Body mass index is 21.67  kg/m².    Physical Exam  Constitutional:       Appearance: He is well-developed.   HENT:      Head: Normocephalic and atraumatic.   Eyes:      Extraocular Movements: EOM normal.      Pupils: Pupils are equal, round, and reactive to light.   Pulmonary:      Effort: Pulmonary effort is normal. No respiratory distress.   Musculoskeletal:         General: Normal range of motion.   Neurological:      Mental Status: He is alert and oriented to person, place, and time.      Coordination: Finger-Nose-Finger Test normal.   Psychiatric:         Speech: Speech normal.         Behavior: Behavior normal.       NEUROLOGICAL EXAMINATION:     MENTAL STATUS   Oriented to person, place, and time.   Registration: recalls 3 of 3 objects.   Attention: normal. Concentration: normal.   Speech: speech is normal   Level of consciousness: alert    CRANIAL NERVES     CN II   Right visual field deficit: none  Left visual field deficit: none     CN III, IV, VI   Pupils are equal, round, and reactive to light.  Extraocular motions are normal.   Right pupil: Size: 4 mm.   Left pupil: Size: 4 mm.     GAIT AND COORDINATION      Coordination   Finger to nose coordination: normal    Tremor   Resting tremor: absent    Significant Labs: All pertinent lab results from the past 24 hours have been reviewed.    Significant Imaging: I have reviewed all pertinent imaging results/findings within the past 24 hours.

## 2023-01-24 NOTE — DISCHARGE SUMMARY
"[facility] Medicine  Discharge Summary      Patient Name: Paty Jiménez  MRN: [id number]  RAPHAEL: [id number]  Patient Class: OP- Observation  Admission Date: 1/21/2023  Hospital Length of Stay: 0 days  Discharge Date and Time: 1/24/2023  9:56 AM  Attending Physician: No att. providers found   Discharging Provider: Julio Ozuna III, MD  Primary Care Provider: To Obtain Unable    Primary Care Team: Networked reference to record PCT     HPI:   62 y.o. male with a past medical history of Alcohol Abuse, who presents to the ED with seizure like activity onset 1.5 hours ago. Per EMS, patient was watching TV with family when he had a 3 minute witnessed seizure. Patient has no prior history of seizures. Per EMS, the patient had a CBG of 158 and was not oriented to person or time in the field. EMS denies administering any medications en route to the ED. No exacerbating or alleviating factors. Patient currently oriented to person and place. Patient endorses EtOH use with beer and states that he drinks "every day." Patient denies headache, chest pain, nausea, abdominal pain, neck pain, or other associated symptoms. Patient is a poor historian and is unaccompanied at bedside.  Differentials include alcohol withdrawal, seizure, CVA, syncope.  Work up revealed- seizure like symptoms currently resolved,  H/H stable, no leukocytosis, u/a negative, troponin WNL, calcium 7.1, serum alcohol WNL, CT of head without acute abnormality, EKG no ischemic changes.  Patient being admitted to to [facility] medicine observation unit for further medical management       * No surgery found *      Hospital Course:   62y M admitted for seizure. CT head without acute finding. C/f EtOH seizure. Neurology consulted who recommended continuing pt on keppra 250 mg bid for now. Pt feeling better in the morning without recurrent seizure activity. Pt d/c home with rx for valium and keppra. Pt advised to quit drinking with valium taper, but " told to not take this if he is going to continue to drink as it could be sedating. Pt endorsed understanding. Pt given referral for outpatient neurology f/u on d/c.        Goals of Care Treatment Preferences:  Code Status: Full Code      Consults:   Consults (From admission, onward)        Status Ordering Provider     Inpatient consult to Neurology  Once        Provider:  Surya Vallejo MD    Completed JAMES JOHNSON     IP consult to case management/social work  Once        Provider:  (Not yet assigned)    Completed JAMES JOHNSON          No new Assessment & Plan notes have been filed under this hospital service since the last note was generated.  Service: Hospital Medicine    Final Active Diagnoses:    Diagnosis Date Noted POA    PRINCIPAL PROBLEM:  Seizure-like activity [R56.9] 01/22/2023 Yes    Hypertension [I10] 01/23/2023 Yes    Hypocalcemia [E83.51] 01/22/2023 Yes    Alcohol abuse [F10.10] 07/15/2022 Yes    Tobacco use disorder [F17.200] 07/15/2022 Yes      Problems Resolved During this Admission:       Discharged Condition: fair    Disposition: Home or Self Care    Follow Up:    Patient Instructions:      Ambulatory referral/consult to Neurology   Standing Status: Future   Referral Priority: Routine Referral Type: Consultation   Referral Reason: Specialty Services Required   Requested Specialty: Neurology   Number of Visits Requested: 1     Diet Cardiac     Notify your health care provider if you experience any of the following:  increased confusion or weakness     Notify your health care provider if you experience any of the following:  persistent dizziness, light-headedness, or visual disturbances     Notify your health care provider if you experience any of the following:  worsening rash     Notify your health care provider if you experience any of the following:  severe persistent headache     Notify your health care provider if you experience any of the following:  redness, tenderness, or  signs of infection (pain, swelling, redness, odor or green/yellow discharge around incision site)     Notify your health care provider if you experience any of the following:  difficulty breathing or increased cough     Notify your health care provider if you experience any of the following:  severe uncontrolled pain     Notify your health care provider if you experience any of the following:  persistent nausea and vomiting or diarrhea     Notify your health care provider if you experience any of the following:  temperature >100.4     Activity as tolerated       Significant Diagnostic Studies: Labs: All labs within the past 24 hours have been reviewed    Pending Diagnostic Studies:     None         Medications:  Reconciled Home Medications:      Medication List      START taking these medications    diazePAM 5 MG tablet  Commonly known as: VALIUM  Take 1 tablet (5 mg total) by mouth every 6 (six) hours for 1 day, THEN 1 tablet (5 mg total) every 8 (eight) hours for 1 day, THEN 1 tablet (5 mg total) every 12 (twelve) hours for 1 day, THEN 1 tablet (5 mg total) every evening for 1 day.  Start taking on: January 24, 2023     levETIRAcetam 250 MG Tab  Commonly known as: KEPPRA  Take 1 tablet (250 mg total) by mouth 2 (two) times daily.            Indwelling Lines/Drains at time of discharge:   Lines/Drains/Airways     None                 Time spent on the discharge of patient: 30 minutes         Julio Ozuna III, MD  Department of Hospital Medicine  [facility]

## 2023-01-24 NOTE — HPI
62 year old male with prior alcohol abuse admitted to the hospital for assessment after having a seizure. He was watching TV and has no recall of what happened other than he woke up an unknown period of time later in the hospital. Per chart review he had a 3 minute seizure and he has no prior seizure history. He currently drinks (beer) daily. He has been back to his baseline and denies any problems.

## 2023-01-24 NOTE — PLAN OF CARE
Plan of care is ongoing.      Problem: Adult Inpatient Plan of Care  Goal: Plan of Care Review  Outcome: Ongoing, Progressing  Goal: Patient-Specific Goal (Individualized)  Outcome: Ongoing, Progressing  Goal: Absence of Hospital-Acquired Illness or Injury  Outcome: Ongoing, Progressing  Goal: Optimal Comfort and Wellbeing  Outcome: Ongoing, Progressing

## 2023-05-12 ENCOUNTER — HOSPITAL ENCOUNTER (INPATIENT)
Facility: HOSPITAL | Age: 63
LOS: 2 days | Discharge: HOME | DRG: 897 | End: 2023-05-14
Attending: EMERGENCY MEDICINE | Admitting: STUDENT IN AN ORGANIZED HEALTH CARE EDUCATION/TRAINING PROGRAM
Payer: MEDICAID

## 2023-05-12 DIAGNOSIS — S09.90XA CLOSED HEAD INJURY: ICD-10-CM

## 2023-05-12 DIAGNOSIS — F10.20 ALCOHOLISM: ICD-10-CM

## 2023-05-12 DIAGNOSIS — F10.10 ALCOHOL ABUSE: ICD-10-CM

## 2023-05-12 DIAGNOSIS — F05 POSTICTAL CONFUSION: ICD-10-CM

## 2023-05-12 DIAGNOSIS — G40.909 RECURRENT SEIZURES: ICD-10-CM

## 2023-05-12 DIAGNOSIS — R56.9 SEIZURE: Primary | ICD-10-CM

## 2023-05-12 LAB
ALBUMIN SERPL BCP-MCNC: 3.8 G/DL (ref 3.5–5.2)
ALP SERPL-CCNC: 120 U/L (ref 55–135)
ALT SERPL W/O P-5'-P-CCNC: 27 U/L (ref 10–44)
ANION GAP SERPL CALC-SCNC: 13 MMOL/L (ref 8–16)
AST SERPL-CCNC: 64 U/L (ref 10–40)
BASOPHILS # BLD AUTO: 0.02 K/UL (ref 0–0.2)
BASOPHILS NFR BLD: 0.3 % (ref 0–1.9)
BILIRUB SERPL-MCNC: 0.8 MG/DL (ref 0.1–1)
BUN SERPL-MCNC: 10 MG/DL (ref 8–23)
CALCIUM SERPL-MCNC: 9.3 MG/DL (ref 8.7–10.5)
CHLORIDE SERPL-SCNC: 103 MMOL/L (ref 95–110)
CO2 SERPL-SCNC: 20 MMOL/L (ref 23–29)
CREAT SERPL-MCNC: 0.8 MG/DL (ref 0.5–1.4)
DIFFERENTIAL METHOD: ABNORMAL
EOSINOPHIL # BLD AUTO: 0 K/UL (ref 0–0.5)
EOSINOPHIL NFR BLD: 0 % (ref 0–8)
ERYTHROCYTE [DISTWIDTH] IN BLOOD BY AUTOMATED COUNT: 14.7 % (ref 11.5–14.5)
EST. GFR  (NO RACE VARIABLE): >60 ML/MIN/1.73 M^2
ETHANOL SERPL-MCNC: <10 MG/DL
GLUCOSE SERPL-MCNC: 111 MG/DL (ref 70–110)
HCT VFR BLD AUTO: 35.3 % (ref 40–54)
HGB BLD-MCNC: 12.9 G/DL (ref 14–18)
IMM GRANULOCYTES # BLD AUTO: 0.03 K/UL (ref 0–0.04)
IMM GRANULOCYTES NFR BLD AUTO: 0.5 % (ref 0–0.5)
INR PPP: 1.1 (ref 0.8–1.2)
LYMPHOCYTES # BLD AUTO: 0.8 K/UL (ref 1–4.8)
LYMPHOCYTES NFR BLD: 12.1 % (ref 18–48)
MAGNESIUM SERPL-MCNC: 2.1 MG/DL (ref 1.6–2.6)
MCH RBC QN AUTO: 31.1 PG (ref 27–31)
MCHC RBC AUTO-ENTMCNC: 36.5 G/DL (ref 32–36)
MCV RBC AUTO: 85 FL (ref 82–98)
MONOCYTES # BLD AUTO: 0.6 K/UL (ref 0.3–1)
MONOCYTES NFR BLD: 9.2 % (ref 4–15)
NEUTROPHILS # BLD AUTO: 5 K/UL (ref 1.8–7.7)
NEUTROPHILS NFR BLD: 77.9 % (ref 38–73)
NRBC BLD-RTO: 0 /100 WBC
PLATELET # BLD AUTO: ABNORMAL K/UL (ref 150–450)
PLATELET BLD QL SMEAR: ABNORMAL
PMV BLD AUTO: ABNORMAL FL (ref 9.2–12.9)
POTASSIUM SERPL-SCNC: 4.1 MMOL/L (ref 3.5–5.1)
PROT SERPL-MCNC: 8.3 G/DL (ref 6–8.4)
PROTHROMBIN TIME: 11.8 SEC (ref 9–12.5)
RBC # BLD AUTO: 4.15 M/UL (ref 4.6–6.2)
SODIUM SERPL-SCNC: 136 MMOL/L (ref 136–145)
WBC # BLD AUTO: 6.39 K/UL (ref 3.9–12.7)

## 2023-05-12 PROCEDURE — 85025 COMPLETE CBC W/AUTO DIFF WBC: CPT | Performed by: EMERGENCY MEDICINE

## 2023-05-12 PROCEDURE — 85610 PROTHROMBIN TIME: CPT | Performed by: EMERGENCY MEDICINE

## 2023-05-12 PROCEDURE — 99285 EMERGENCY DEPT VISIT HI MDM: CPT | Mod: 25

## 2023-05-12 PROCEDURE — 95816 PR EEG,W/AWAKE & DROWSY RECORD: ICD-10-PCS | Mod: 26,,, | Performed by: PSYCHIATRY & NEUROLOGY

## 2023-05-12 PROCEDURE — 96365 THER/PROPH/DIAG IV INF INIT: CPT

## 2023-05-12 PROCEDURE — 25000003 PHARM REV CODE 250: Performed by: EMERGENCY MEDICINE

## 2023-05-12 PROCEDURE — 96366 THER/PROPH/DIAG IV INF ADDON: CPT

## 2023-05-12 PROCEDURE — 95816 EEG AWAKE AND DROWSY: CPT | Mod: 26,,, | Performed by: PSYCHIATRY & NEUROLOGY

## 2023-05-12 PROCEDURE — 63600175 PHARM REV CODE 636 W HCPCS: Performed by: STUDENT IN AN ORGANIZED HEALTH CARE EDUCATION/TRAINING PROGRAM

## 2023-05-12 PROCEDURE — 11000001 HC ACUTE MED/SURG PRIVATE ROOM

## 2023-05-12 PROCEDURE — 82077 ASSAY SPEC XCP UR&BREATH IA: CPT | Performed by: EMERGENCY MEDICINE

## 2023-05-12 PROCEDURE — 83735 ASSAY OF MAGNESIUM: CPT | Performed by: EMERGENCY MEDICINE

## 2023-05-12 PROCEDURE — 63600175 PHARM REV CODE 636 W HCPCS: Performed by: EMERGENCY MEDICINE

## 2023-05-12 PROCEDURE — 25000003 PHARM REV CODE 250: Performed by: STUDENT IN AN ORGANIZED HEALTH CARE EDUCATION/TRAINING PROGRAM

## 2023-05-12 PROCEDURE — 95816 EEG AWAKE AND DROWSY: CPT

## 2023-05-12 PROCEDURE — 96375 TX/PRO/DX INJ NEW DRUG ADDON: CPT

## 2023-05-12 PROCEDURE — 80053 COMPREHEN METABOLIC PANEL: CPT | Performed by: EMERGENCY MEDICINE

## 2023-05-12 RX ORDER — TALC
6 POWDER (GRAM) TOPICAL NIGHTLY
Status: DISCONTINUED | OUTPATIENT
Start: 2023-05-12 | End: 2023-05-14 | Stop reason: HOSPADM

## 2023-05-12 RX ORDER — CLONIDINE HYDROCHLORIDE 0.1 MG/1
0.1 TABLET ORAL EVERY 8 HOURS
Status: DISCONTINUED | OUTPATIENT
Start: 2023-05-12 | End: 2023-05-13

## 2023-05-12 RX ORDER — HYDRALAZINE HYDROCHLORIDE 20 MG/ML
10 INJECTION INTRAMUSCULAR; INTRAVENOUS
Status: COMPLETED | OUTPATIENT
Start: 2023-05-12 | End: 2023-05-12

## 2023-05-12 RX ORDER — LORAZEPAM 2 MG/ML
2 INJECTION INTRAMUSCULAR EVERY 4 HOURS PRN
Status: DISCONTINUED | OUTPATIENT
Start: 2023-05-12 | End: 2023-05-14 | Stop reason: HOSPADM

## 2023-05-12 RX ORDER — LORAZEPAM 2 MG/ML
1 INJECTION INTRAMUSCULAR EVERY 4 HOURS PRN
Status: DISCONTINUED | OUTPATIENT
Start: 2023-05-12 | End: 2023-05-14 | Stop reason: HOSPADM

## 2023-05-12 RX ORDER — GABAPENTIN 400 MG/1
1200 CAPSULE ORAL ONCE
Status: DISCONTINUED | OUTPATIENT
Start: 2023-05-12 | End: 2023-05-13

## 2023-05-12 RX ORDER — FOLIC ACID 1 MG/1
1 TABLET ORAL DAILY
Status: DISCONTINUED | OUTPATIENT
Start: 2023-05-13 | End: 2023-05-14 | Stop reason: HOSPADM

## 2023-05-12 RX ORDER — DIAZEPAM 10 MG/2ML
5 INJECTION INTRAMUSCULAR
Status: COMPLETED | OUTPATIENT
Start: 2023-05-12 | End: 2023-05-12

## 2023-05-12 RX ORDER — LORAZEPAM 2 MG/ML
1 INJECTION INTRAMUSCULAR
Status: COMPLETED | OUTPATIENT
Start: 2023-05-12 | End: 2023-05-12

## 2023-05-12 RX ORDER — GABAPENTIN 400 MG/1
800 CAPSULE ORAL 3 TIMES DAILY
Status: DISCONTINUED | OUTPATIENT
Start: 2023-05-12 | End: 2023-05-13

## 2023-05-12 RX ORDER — CLONIDINE 0.1 MG/24H
2 PATCH, EXTENDED RELEASE TRANSDERMAL
Status: DISCONTINUED | OUTPATIENT
Start: 2023-05-13 | End: 2023-05-13

## 2023-05-12 RX ORDER — HYDROXYZINE PAMOATE 25 MG/1
50 CAPSULE ORAL EVERY 4 HOURS PRN
Status: DISCONTINUED | OUTPATIENT
Start: 2023-05-12 | End: 2023-05-14 | Stop reason: HOSPADM

## 2023-05-12 RX ORDER — LEVETIRACETAM 500 MG/5ML
1500 INJECTION, SOLUTION, CONCENTRATE INTRAVENOUS EVERY 12 HOURS
Status: DISCONTINUED | OUTPATIENT
Start: 2023-05-12 | End: 2023-05-13

## 2023-05-12 RX ADMIN — LORAZEPAM 1 MG: 2 INJECTION INTRAMUSCULAR; INTRAVENOUS at 11:05

## 2023-05-12 RX ADMIN — Medication 6 MG: at 09:05

## 2023-05-12 RX ADMIN — GABAPENTIN 800 MG: 400 CAPSULE ORAL at 09:05

## 2023-05-12 RX ADMIN — THIAMINE HYDROCHLORIDE: 100 INJECTION, SOLUTION INTRAMUSCULAR; INTRAVENOUS at 11:05

## 2023-05-12 RX ADMIN — HYDRALAZINE HYDROCHLORIDE 10 MG: 20 INJECTION INTRAMUSCULAR; INTRAVENOUS at 12:05

## 2023-05-12 RX ADMIN — DIAZEPAM 5 MG: 5 INJECTION, SOLUTION INTRAMUSCULAR; INTRAVENOUS at 12:05

## 2023-05-12 RX ADMIN — THIAMINE HYDROCHLORIDE 500 MG: 100 INJECTION, SOLUTION INTRAMUSCULAR; INTRAVENOUS at 09:05

## 2023-05-12 RX ADMIN — LEVETIRACETAM 1500 MG: 100 INJECTION, SOLUTION INTRAVENOUS at 11:05

## 2023-05-12 NOTE — HPI
62y M w/ prior hx of seizures thought to be EtOH/toxin mediated and alcohol use disorder presents w/ seizure. The patient was unable to provide coherent history at the time of examination. Per ED report, the patient was noted to have a seizure at home and had a tonic clonic seizure in the ED. He has prior hx of seizures and was not prescribed AEDs at discharge as they were thought to be from EtOH withdrawal. The patient reportedly drinks daily.

## 2023-05-12 NOTE — ED NOTES
Witnessed seizure at 1105 and lasted approximately 2.5 mins. RN at bedside, Dr. Bowie notified. Airway intact, no injury to patient (pt was on the stretcher with seizure pads).

## 2023-05-12 NOTE — ED PROVIDER NOTES
Encounter Date: 5/12/2023    SCRIBE #1 NOTE: I, Georgina Rai, am scribing for, and in the presence of,  Antonio Bowie MD. I have scribed the following portions of the note - Other sections scribed: HPI, ROS.     History     Chief Complaint   Patient presents with    Seizures     Ems called to 63yo male that had a witnessed seizure by family. Patient did fall and hit his head. Unknown how long seizure lasted. Postictal and urinary incontinent on Ems arrival. Family stated that he was diagnosed with seizures about a year ago and this is his 4th one. Denied taking any meds     Paty Jiménez is a 61 y/o male with PMHx of seizures, who presents to the ED via EMS for seizure onset PTA. ED nurse notes R knee abrasion and head abrasion. Information provided by an independent historian, pt's relative, who states his cousin witnessed the seizure; he reports onset secondary to pt attempting to stand up. Pt's relative states this is the pt's 3rd seizure; last episode onset April. He reports in April, pt was transferrred to [facility] where he was discharged with no follow-up appointment. Pt relative is unsure if pt endorses anti-epileptic medications; states upon each discharge from the hospital, there were no medications prescribed or needed to be picked up. Historian states pt is a regular EtOH user; denies if pt recently stopped drinking, reporting pt lives with his cousin. Upon prompting to ask his cousin if pt recently stopped or reduced EtOH intake, historian responded that his cousin would not know or would lie. Pt denies headache, neck pain, and any other associated symptoms.       The history is provided by the patient and a relative. No  was used.   Review of patient's allergies indicates:  No Known Allergies  Past Medical History:   Diagnosis Date    Seizures      History reviewed. No pertinent surgical history.  Family History   Problem Relation Age of Onset    Cancer Mother      Cancer Father         liver     Social History     Tobacco Use    Smoking status: Every Day     Types: Cigarettes   Substance Use Topics    Alcohol use: Yes     Comment: 3 quarts of beer daily    Drug use: Not Currently     Review of Systems   Musculoskeletal:  Negative for neck pain.   Skin:  Positive for wound.   Neurological:  Positive for seizures. Negative for headaches.     Physical Exam     Initial Vitals [05/12/23 1007]   BP Pulse Resp Temp SpO2   (!) 187/86 73 18 -- 98 %      MAP       --         Physical Exam  The patient was examined specifically for the following:   General:No significant distress, Good color, Warm and dry. Head and neck:Scalp atraumatic, Neck supple. Neurological:Appropriate conversation, Gross motor deficits. Eyes:Conjugate gaze, Clear corneas. ENT: No epistaxis. Cardiac: Regular rate and rhythm, Grossly normal heart tones. Pulmonary: Wheezing, Rales. Gastrointestinal: Abdominal tenderness, Abdominal distention. Musculoskeletal: Extremity deformity, Apparent pain with range of motion of the joints. Skin: Rash.   The findings on examination were normal except for the following:  The patient is confused.  There is a contusion over the right brow.  There is no midline cervical tenderness.  Lungs are clear and free of wheezing rales rubs or rhonchi.  The abdomen is soft.  The patient has an abrasion on the right knee.  Cranial nerves motor and sensory examination grossly unremarkable.  The patient demonstrates normal coordination.    ED Course   Critical Care    Date/Time: 5/12/2023 4:01 PM  Performed by: Antonio Bowie MD  Authorized by: Antonio Bowie MD   Direct patient critical care time: 22 minutes  Additional history critical care time: 11 minutes  Ordering / reviewing critical care time: 11 minutes  Documentation critical care time: 11 minutes  Consulting other physicians critical care time: 5 minutes  Total critical care time (exclusive of procedural time) : 60  minutes  Critical care time was exclusive of separately billable procedures and treating other patients and teaching time.  Critical care was necessary to treat or prevent imminent or life-threatening deterioration of the following conditions: CNS failure or compromise and metabolic crisis.  Critical care was time spent personally by me on the following activities: development of treatment plan with patient or surrogate, discussions with primary provider, evaluation of patient's response to treatment, examination of patient, obtaining history from patient or surrogate, ordering and performing treatments and interventions, ordering and review of laboratory studies, ordering and review of radiographic studies, pulse oximetry, re-evaluation of patient's condition and review of old charts.      Labs Reviewed   CBC W/ AUTO DIFFERENTIAL - Abnormal; Notable for the following components:       Result Value    RBC 4.15 (*)     Hemoglobin 12.9 (*)     Hematocrit 35.3 (*)     MCH 31.1 (*)     MCHC 36.5 (*)     RDW 14.7 (*)     Lymph # 0.8 (*)     Gran % 77.9 (*)     Lymph % 12.1 (*)     Platelet Estimate Clumped (*)     All other components within normal limits   COMPREHENSIVE METABOLIC PANEL - Abnormal; Notable for the following components:    CO2 20 (*)     Glucose 111 (*)     AST 64 (*)     All other components within normal limits   ALCOHOL,MEDICAL (ETHANOL)   MAGNESIUM   PROTIME-INR          Imaging Results              CT Head Without Contrast (Final result)  Result time 05/12/23 11:14:32      Final result by Chapincito Mendez MD (05/12/23 11:14:32)                   Impression:      Extracranial soft tissue swelling without evidence of underlying calvarial fracture or acute intracranial hemorrhage.      Electronically signed by: Chapincito Mendez MD  Date:    05/12/2023  Time:    11:14               Narrative:    EXAMINATION:  CT HEAD WITHOUT CONTRAST    CLINICAL HISTORY:  Head trauma, intracranial venous injury suspected;  Unspecified injury of head, initial encounter    TECHNIQUE:  Low dose axial CT images obtained throughout the head without the use of intravenous contrast.  Axial, sagittal and coronal reconstructions were performed.    Examination mildly degraded by patient motion artifact.    COMPARISON:  CT head 01/22/2023, MRI brain 01/23/2023    FINDINGS:  Intracranial compartment:    Ventricles and sulci are stable in size.  Pattern mild generalized cerebral volume loss, without evidence of hydrocephalus.    The brain parenchyma appears stable.  No new parenchymal hemorrhage, edema, mass effect or major vascular distribution infarct.    No new extra-axial blood or fluid collections.    Scattered vascular calcification about the skull base.    Skull/extracranial contents (limited evaluation):    Left parietal extracranial soft tissue swelling near the vertex.  No displaced calvarial fracture.  Partially visualized remote left orbital floor fracture.    The mastoid air cells and visualized paranasal sinuses are essentially clear.                                    Medical decision making: Given the above this patient presents emergency room after reportedly having a seizure at home.  The patient had a seizure in the emergency room.  The patient has had multiple seizures in the past.  He is a daily drinker.  He is found to have an alcohol level of 0.  Perhaps this is alcohol withdrawal.  He responded to Ativan on both occasions.  I treated him with Keppra after the 2nd seizure.  The patient remains confused at 3:30 p.m..  I will admit this patient to the hospital.  I will treat with benzodiazepines.  I will defer to hospital medicine about continuing with Keppra.  Laboratory work reveals a mild anemia with a hemoglobin of 12.9.  The patient's platelets are clumped.  Chemistries reveal a normal magnesium the glucose is 111 not significantly elevated the CO2 is low at 20.  AST is slightly high at 64.  There is no evidence of  hepatic or renal failure.  Will admit this patient for a prolonged postictal period.  The patient could be an alcohol withdrawal.  Dr. Zambrano accepts the patient.  The case was presented     Medications   levETIRAcetam injection 1,500 mg (1,500 mg Intravenous Given 5/12/23 1115)   cloNIDine 0.1 mg/24 hr td ptwk 2 patch (has no administration in time range)   cloNIDine tablet 0.1 mg (has no administration in time range)   gabapentin capsule 800 mg (has no administration in time range)   folic acid tablet 1 mg (has no administration in time range)   B-complex with vitamin C tablet 1 tablet (has no administration in time range)   melatonin tablet 6 mg (has no administration in time range)   multivitamin tablet (has no administration in time range)   thiamine (B-1) 500 mg in dextrose 5 % (D5W) 100 mL IVPB (has no administration in time range)   hydrOXYzine pamoate capsule 50 mg (has no administration in time range)   sodium chloride 0.9% 1,000 mL with mvi, (ADULT) no.4 with vit K 3,300 unit- 150 mcg/10 mL 10 mL, thiamine 100 mg, folic acid 1 mg infusion ( Intravenous Stopped 5/12/23 1356)   LORazepam injection 1 mg (1 mg Intravenous Given 5/12/23 1109)   diazePAM injection 5 mg (5 mg Intravenous Given 5/12/23 1222)   hydrALAZINE injection 10 mg (10 mg Intravenous Given 5/12/23 1230)              Scribe Attestation:   Scribe #1: I performed the above scribed service and the documentation accurately describes the services I performed. I attest to the accuracy of the note.             I personally performed the services described in this documentation.  All medical record  entries made by the scribe are at my direction and in my presence.  Signed, Dr. Bowie       Clinical Impression:   Final diagnoses:  [S09.90XA] Closed head injury  [R56.9] Seizure (Primary)  [G40.909] Recurrent seizures  [F10.20] Alcoholism  [F05] Postictal confusion        ED Disposition Condition    Admit Stable                Antonio Bowie,  MD  05/12/23 1605

## 2023-05-12 NOTE — SUBJECTIVE & OBJECTIVE
Past Medical History:   Diagnosis Date    Seizures        History reviewed. No pertinent surgical history.    Review of patient's allergies indicates:  No Known Allergies    No current facility-administered medications on file prior to encounter.     Current Outpatient Medications on File Prior to Encounter   Medication Sig    diazePAM (VALIUM) 5 MG tablet Take 1 tablet (5 mg total) by mouth every 6 (six) hours for 1 day, THEN 1 tablet (5 mg total) every 8 (eight) hours for 1 day, THEN 1 tablet (5 mg total) every 12 (twelve) hours for 1 day, THEN 1 tablet (5 mg total) every evening for 1 day.    levETIRAcetam (KEPPRA) 250 MG Tab Take 1 tablet (250 mg total) by mouth 2 (two) times daily.     Family History       Problem Relation (Age of Onset)    Cancer Mother, Father          Tobacco Use    Smoking status: Every Day     Types: Cigarettes    Smokeless tobacco: Not on file   Substance and Sexual Activity    Alcohol use: Yes     Comment: 3 quarts of beer daily    Drug use: Not Currently    Sexual activity: Not on file     Review of Systems   Unable to perform ROS: Mental status change   Objective:     Vital Signs (Most Recent):  Pulse: 74 (05/12/23 1547)  Resp: (!) 31 (05/12/23 1502)  BP: (!) 152/71 (05/12/23 1547)  SpO2: 96 % (05/12/23 1547) Vital Signs (24h Range):  Pulse:  [] 74  Resp:  [17-43] 31  SpO2:  [82 %-100 %] 96 %  BP: (145-244)/() 152/71     Weight: 63.5 kg (140 lb)  Body mass index is 22.6 kg/m².     Physical Exam  Constitutional:       General: He is not in acute distress.     Appearance: He is ill-appearing and diaphoretic. He is not toxic-appearing.   Cardiovascular:      Rate and Rhythm: Normal rate and regular rhythm.      Heart sounds: No murmur heard.    No gallop.   Pulmonary:      Effort: Pulmonary effort is normal. No respiratory distress.      Breath sounds: Normal breath sounds. No wheezing.   Abdominal:      General: Bowel sounds are normal. There is no distension.       Palpations: Abdomen is soft.      Tenderness: There is no abdominal tenderness.   Neurological:      Comments: Lethargic, unable to answer questions, no focal deficits              Significant Labs: All pertinent labs within the past 24 hours have been reviewed.    Significant Imaging: I have reviewed all pertinent imaging results/findings within the past 24 hours.

## 2023-05-12 NOTE — H&P
[facility] Medicine  History & Physical    Patient Name: Paty Jiménez  MRN: [id number]  Patient Class: IP- Inpatient  Admission Date: 5/12/2023  Attending Physician: Sriram Zambrano MD  Primary Care Provider: To Obtain Unable         Patient information was obtained from patient, past medical records and ER records.     Subjective:     Principal Problem:Seizure    Chief Complaint:   Chief Complaint   Patient presents with    Seizures     Ems called to 61yo male that had a witnessed seizure by family. Patient did fall and hit his head. Unknown how long seizure lasted. Postictal and urinary incontinent on Ems arrival. Family stated that he was diagnosed with seizures about a year ago and this is his 4th one. 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 The patient was unable to provide coherent history at the time of examination. Per ED report, the patient was noted to have a seizure at home and had a tonic clonic seizure in the ED. He has prior hx of seizures and was not prescribed AEDs at discharge as they were thought to be from EtOH withdrawal. The patient reportedly drinks daily.       Past Medical History:   Diagnosis Date    Seizures        History reviewed. No pertinent surgical history.    Review of patient's allergies indicates:  No Known Allergies    No current facility-administered medications on file prior to encounter.     Current Outpatient Medications on File Prior to Encounter   Medication Sig    diazePAM (VALIUM) 5 MG tablet Take 1 tablet (5 mg total) by mouth every 6 (six) hours for 1 day, THEN 1 tablet (5 mg total) every 8 (eight) hours for 1 day, THEN 1 tablet (5 mg total) every 12 (twelve) hours for 1 day, THEN 1 tablet (5 mg total) every evening for 1 day.    levETIRAcetam (KEPPRA) 250 MG Tab Take 1 tablet (250 mg total) by mouth 2 (two) times daily.     Family History        Problem Relation (Age of Onset)    Cancer Mother, Father          Tobacco Use    Smoking status: Every Day     Types: Cigarettes    Smokeless tobacco: Not on file   Substance and Sexual Activity    Alcohol use: Yes     Comment: 3 quarts of beer daily    Drug use: Not Currently    Sexual activity: Not on file     Review of Systems   Unable to perform ROS: Mental status change   Objective:     Vital Signs (Most Recent):  Pulse: 74 (05/12/23 1547)  Resp: (!) 31 (05/12/23 1502)  BP: (!) 152/71 (05/12/23 1547)  SpO2: 96 % (05/12/23 1547) Vital Signs (24h Range):  Pulse:  [] 74  Resp:  [17-43] 31  SpO2:  [82 %-100 %] 96 %  BP: (145-244)/() 152/71     Weight: 63.5 kg (140 lb)  Body mass index is 22.6 kg/m².     Physical Exam  Constitutional:       General: He is not in acute distress.     Appearance: He is ill-appearing and diaphoretic. He is not toxic-appearing.   Cardiovascular:      Rate and Rhythm: Normal rate and regular rhythm.      Heart sounds: No murmur heard.    No gallop.   Pulmonary:      Effort: Pulmonary effort is normal. No respiratory distress.      Breath sounds: Normal breath sounds. No wheezing.   Abdominal:      General: Bowel sounds are normal. There is no distension.      Palpations: Abdomen is soft.      Tenderness: There is no abdominal tenderness.   Neurological:      Comments: Lethargic, unable to answer questions, no focal deficits              Significant Labs: All pertinent labs within the past 24 hours have been reviewed.    Significant Imaging: I have reviewed all pertinent imaging results/findings within the past 24 hours.    Assessment/Plan:     * Seizure  Pt reported to have seizure prior to arrival and then had tonic clonic movements in the ED. Unclear if this is EtOH withdrawal or development of primary seizure disorder, as pt has had three similar episodes this year.   - loaded with Keppra by the ED  - EEG pending  - teleneurology consultation pending  - see  alcohol abuse for withdrawal management        Hypertension  Clonidine as above      Tobacco use disorder  Will  when appropriate      Alcohol abuse  Unclear if the patient's seizures are due to withdrawal. No other symptoms of withdrawal at the time of my exam.   - benzodiazepine sparing protocol with clonidine, gabapentin  - CIWA scores  - lorazepam prn for elevated CIWA  - thiamine/MV        VTE Risk Mitigation (From admission, onward)    None                     Sriram Zambrano MD  Department of Hospital Medicine  [facility] - Emergency Dept

## 2023-05-12 NOTE — ED TRIAGE NOTES
BIB EMS, pt with witnessed seizure activity.  Pt with hx of seizures, but son unable to tell if any medication was prescribed for seizure.  Pt is also a daily drinker.  Pt is combative at this time.  Seizure pads placed on side rails.

## 2023-05-12 NOTE — ASSESSMENT & PLAN NOTE
Unclear if the patient's seizures are due to withdrawal. No other symptoms of withdrawal at the time of my exam.   - benzodiazepine sparing protocol with clonidine, gabapentin  - CIWA scores  - lorazepam prn for elevated CIWA  - thiamine/MV

## 2023-05-12 NOTE — ED NOTES
Report called to the floor to Nilsa SMITH. Pt alert and orientated although non-compliant still. Pt on Seizure precautions, transport requested. Telemetry box 2326 connected and signal confirmed with Tele tech.

## 2023-05-12 NOTE — ASSESSMENT & PLAN NOTE
Pt reported to have seizure prior to arrival and then had tonic clonic movements in the ED. Unclear if this is EtOH withdrawal or development of primary seizure disorder, as pt has had three similar episodes this year.   - loaded with Keppra by the ED  - EEG pending  - teleneurology consultation pending  - see alcohol abuse for withdrawal management

## 2023-05-13 PROCEDURE — 11000001 HC ACUTE MED/SURG PRIVATE ROOM

## 2023-05-13 PROCEDURE — 99223 1ST HOSP IP/OBS HIGH 75: CPT | Mod: ,,, | Performed by: PSYCHIATRY & NEUROLOGY

## 2023-05-13 PROCEDURE — 99223 PR INITIAL HOSPITAL CARE,LEVL III: ICD-10-PCS | Mod: ,,, | Performed by: PSYCHIATRY & NEUROLOGY

## 2023-05-13 PROCEDURE — 63600175 PHARM REV CODE 636 W HCPCS: Performed by: STUDENT IN AN ORGANIZED HEALTH CARE EDUCATION/TRAINING PROGRAM

## 2023-05-13 PROCEDURE — 25000003 PHARM REV CODE 250: Performed by: STUDENT IN AN ORGANIZED HEALTH CARE EDUCATION/TRAINING PROGRAM

## 2023-05-13 PROCEDURE — 63600175 PHARM REV CODE 636 W HCPCS: Performed by: EMERGENCY MEDICINE

## 2023-05-13 RX ORDER — LEVETIRACETAM 500 MG/5ML
750 INJECTION, SOLUTION, CONCENTRATE INTRAVENOUS EVERY 12 HOURS
Status: DISCONTINUED | OUTPATIENT
Start: 2023-05-13 | End: 2023-05-14

## 2023-05-13 RX ORDER — ENOXAPARIN SODIUM 100 MG/ML
40 INJECTION SUBCUTANEOUS EVERY 24 HOURS
Status: DISCONTINUED | OUTPATIENT
Start: 2023-05-13 | End: 2023-05-14 | Stop reason: HOSPADM

## 2023-05-13 RX ADMIN — Medication 1 TABLET: at 09:05

## 2023-05-13 RX ADMIN — THIAMINE HYDROCHLORIDE 500 MG: 100 INJECTION, SOLUTION INTRAMUSCULAR; INTRAVENOUS at 02:05

## 2023-05-13 RX ADMIN — CLONIDINE HYDROCHLORIDE 0.1 MG: 0.1 TABLET ORAL at 12:05

## 2023-05-13 RX ADMIN — THIAMINE HYDROCHLORIDE 500 MG: 100 INJECTION, SOLUTION INTRAMUSCULAR; INTRAVENOUS at 09:05

## 2023-05-13 RX ADMIN — LEVETIRACETAM 1500 MG: 100 INJECTION, SOLUTION INTRAVENOUS at 12:05

## 2023-05-13 RX ADMIN — THERA TABS 1 TABLET: TAB at 09:05

## 2023-05-13 RX ADMIN — FOLIC ACID 1 MG: 1 TABLET ORAL at 09:05

## 2023-05-13 RX ADMIN — LEVETIRACETAM 750 MG: 100 INJECTION, SOLUTION INTRAVENOUS at 09:05

## 2023-05-13 RX ADMIN — ENOXAPARIN SODIUM 40 MG: 40 INJECTION SUBCUTANEOUS at 04:05

## 2023-05-13 RX ADMIN — Medication 6 MG: at 09:05

## 2023-05-13 NOTE — PROCEDURES
EEG Extended Monitoring up to one hour    Date/Time: 5/12/2023 10:10 AM  Performed by: Orlando Fraire MD  Authorized by: Sriram Zambrano MD     ELECTROENCEPHALOGRAM REPORT    DATE OF SERVICE: 5/12/23  EEG NUMBER: OW   REQUESTED BY: Juan Manuel  LOCATION OF SERVICE: [facility]    METHODOLOGY   Electroencephalographic (EEG) recording is with electrodes placed according to the International 10-20 placement system.  Thirty two (32) channels of digital signal (sampling rate of 512/sec) including T1 and T2 was simultaneously recorded from the scalp and may include  EKG, EMG, and/or eye monitors.  Recording band pass was 0.1 to 512 hz.  Digital video recording of the patient is simultaneously recorded with the EEG.  The patient is instructed report clinical symptoms which may occur during the recording session.  EEG and video recording is stored and archived in digital format. Activation procedures which include photic stimulation, hyperventilation and instructing patients to perform simple task are done in selected patients.    The EEG is displayed on a monitor screen and can be reviewed using different montages.  Computer assisted analysis is employed to detect spike and electrographic seizure activity.   The entire record is submitted for computer analysis.  The entire recording is visually reviewed and the times identified by computer analysis as being spikes or seizures are reviewed again.  Compresses spectral analysis (CSA) is also performed on the activity recorded from each individual channel.  This is displayed as a power display of frequencies from 0 to 30 Hz over time.   The CSA is reviewed looking for asymmetries in power between homologous areas of the scalp and then compared with the original EEG recording.     [employer] software was also utilized in the review of this study.  This software suite analyzes the EEG recording in multiple domains.  Coherence and rhythmicity is computed to identify EEG sections which may  contain organized seizures.  Each channel undergoes analysis to detect presence of spike and sharp waves which have special and morphological characteristic of epileptic activity.  The routine EEG recording is converted from spacial into frequency domain.  This is then displayed comparing homologous areas to identify areas of significant asymmetry.  Algorithm to identify non-cortically generated artifact is used to separate eye movement, EMG and other artifact from the EEG    EEG FINDINGS  The record shows a fair  organization at rest, consisting of a 8 Hz posterior dominant rhythm with good  reactivity. There is mild bilateral beta activity. There is continuous diffuse theta and delta range background slowing.    Drowsiness is characterized by attenuation of the background, vertex waves, and bilateral theta slowing.     Provocative maneuvers including hyperventilation and photic stimulation were not performed.     EKG recording shows a regular rhythm.    There is no push button or clinical event.    IMPRESSION:  Abnormal study due to mild  diffuse background slowing consistent with diffuse cerebral dysfunction and encephalopathy which may be on the basis of toxic, metabolic, or primary neuronal disorder.     Orlando Fraire MD

## 2023-05-13 NOTE — NURSING
[facility] [place]  Nurses Note -- 4 Eyes      5/13/2023       Skin assessed on: Q Shift      [x] No Pressure Injuries Present    []Prevention Measures Documented    [] Yes LDA  for Pressure Injury Previously documented     [] Yes New Pressure Injury Discovered   [] LDA for New Pressure Injury Added      Attending RN:  Ninfa Ceron RN     Second RN:  anu herman

## 2023-05-13 NOTE — PROGRESS NOTES
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 The patient was unable to provide coherent history at the time of examination. Per ED report, the patient was noted to have a seizure at home and had a tonic clonic seizure in the ED. He has prior hx of seizures and was not prescribed AEDs at discharge as they were thought to be from EtOH withdrawal. The patient reportedly drinks daily.       Overview/Hospital Course:  Spot EEG without seizures. No observed seizures overnight. No evidence of EtOH withdrawal.      Interval History: Pt oriented. No recurrent seizures. No evidence of alcohol withdrawal.    Review of Systems   Constitutional:  Negative for chills and fever.   Respiratory:  Negative for cough and shortness of breath.    Cardiovascular:  Negative for chest pain and leg swelling.   Gastrointestinal:  Negative for abdominal distention and abdominal pain.   Objective:     Vital Signs (Most Recent):  Temp: 98.1 °F (36.7 °C) (05/13/23 1120)  Pulse: (!) 56 (05/13/23 1120)  Resp: 17 (05/13/23 1120)  BP: 123/61 (05/13/23 1120)  SpO2: 100 % (05/13/23 1120) Vital Signs (24h Range):  Temp:  [97 °F (36.1 °C)-98.6 °F (37 °C)] 98.1 °F (36.7 °C)  Pulse:  [55-86] 56  Resp:  [16-31] 17  SpO2:  [96 %-100 %] 100 %  BP: ()/(53-80) 123/61     Weight: 63.5 kg (140 lb)  Body mass index is 22.6 kg/m².    Intake/Output Summary (Last 24 hours) at 5/13/2023 1258  Last data filed at 5/13/2023 1244  Gross per 24 hour   Intake 219.52 ml   Output 300 ml   Net -80.48 ml         Physical Exam  Constitutional:       General: He is not in acute  distress.     Appearance: He is not ill-appearing, toxic-appearing or diaphoretic.   Cardiovascular:      Rate and Rhythm: Normal rate and regular rhythm.      Heart sounds: No murmur heard.    No gallop.   Pulmonary:      Effort: No respiratory distress.      Breath sounds: Normal breath sounds. No wheezing or rales.   Abdominal:      General: Bowel sounds are normal. There is no distension.      Palpations: Abdomen is soft.      Tenderness: There is no abdominal tenderness.           Significant Labs: All pertinent labs within the past 24 hours have been reviewed.    Significant Imaging: I have reviewed all pertinent imaging results/findings within the past 24 hours.      Assessment/Plan:      * Seizure  Pt reported to have seizure prior to arrival and then had tonic clonic movements in the ED. Unclear if this is EtOH withdrawal or development of primary seizure disorder, as pt has had three similar episodes this year. Drinks 6 beers/day, but denies withdrawal sx prior to this episode and has no e/o withdrawal currently. EEG without epileptic activity  - loaded with Keppra by the ED   - cont Keppra  - teleneurology consultation pending  - MRI performed in January without significant pathology  - see alcohol abuse for withdrawal management        Hypertension  Treat prn      Tobacco use disorder  Will  when appropriate      Alcohol abuse  Unclear if the patient's seizures are due to withdrawal. No other symptoms of withdrawal at the time of my exam on admission or today  - CIWA scores  - lorazepam prn for elevated CIWA  - thiamine/MV        VTE Risk Mitigation (From admission, onward)         Ordered     enoxaparin injection 40 mg  Daily         05/13/23 1301                Discharge Planning   BROOKE:      Code Status: Full Code   Is the patient medically ready for discharge?:     Reason for patient still in hospital (select all that apply): Patient trending condition, Laboratory test, Treatment, Consult  recommendations and Pending disposition                     Sriram Zambrano MD  Department of Hospital Medicine   [facility]

## 2023-05-13 NOTE — HOSPITAL COURSE
Spot EEG without seizures. No observed seizures during his hospital course. No evidence of alcohol withdrawal. Neurology not available on site, but teleneurology was consulted and felt that the seizures were likely due to EtOH but given uncertainty and recurrent seizures they recommended keppra w/ neurology follow up. Pt given seizure precautions. All questions answered prior to dc, pt in agreement with plan.

## 2023-05-13 NOTE — ASSESSMENT & PLAN NOTE
Unclear if the patient's seizures are due to withdrawal. No other symptoms of withdrawal at the time of my exam on admission or today  - CIWA scores  - lorazepam prn for elevated CIWA  - thiamine/MV

## 2023-05-13 NOTE — ASSESSMENT & PLAN NOTE
Pt reported to have seizure prior to arrival and then had tonic clonic movements in the ED. Unclear if this is EtOH withdrawal or development of primary seizure disorder, as pt has had three similar episodes this year. Drinks 6 beers/day, but denies withdrawal sx prior to this episode and has no e/o withdrawal currently. EEG without epileptic activity  - loaded with Keppra by the ED   - cont Keppra  - teleneurology consultation pending  - MRI performed in January without significant pathology  - see alcohol abuse for withdrawal management

## 2023-05-13 NOTE — NURSING
[facility] [place]  Nurses Note -- 4 Eyes      5/13/2023       Skin assessed on: Q Shift      [x] No Pressure Injuries Present    []Prevention Measures Documented    [] Yes LDA  for Pressure Injury Previously documented     [] Yes New Pressure Injury Discovered   [] LDA for New Pressure Injury Added      Attending RN:  Keila Weiner LPN     Second RN:  Jailene Ceron RN

## 2023-05-13 NOTE — PLAN OF CARE
Problem: Skin Injury Risk Increased  Goal: Skin Health and Integrity  Intervention: Optimize Skin Protection  Flowsheets (Taken 5/13/2023 0409)  Pressure Reduction Techniques:   frequent weight shift encouraged   rest period provided between sit times  Skin Protection:   incontinence pads utilized   tubing/devices free from skin contact  Intervention: Promote and Optimize Oral Intake  Flowsheets (Taken 5/13/2023 0409)  Oral Nutrition Promotion:   physical activity promoted   rest periods promoted

## 2023-05-13 NOTE — NURSING
"Arrived to floor via stretcher from ED, transferred to bed w/o incident, alert but drowsy & yawning, denies pain at this time, stated "let me sleep please",no distress noted, seizure pad applied to both sides of bed, no distress noted,safety maintained.  "

## 2023-05-13 NOTE — SUBJECTIVE & OBJECTIVE
Interval History: Pt oriented. No recurrent seizures. No evidence of alcohol withdrawal.    Review of Systems   Constitutional:  Negative for chills and fever.   Respiratory:  Negative for cough and shortness of breath.    Cardiovascular:  Negative for chest pain and leg swelling.   Gastrointestinal:  Negative for abdominal distention and abdominal pain.   Objective:     Vital Signs (Most Recent):  Temp: 98.1 °F (36.7 °C) (05/13/23 1120)  Pulse: (!) 56 (05/13/23 1120)  Resp: 17 (05/13/23 1120)  BP: 123/61 (05/13/23 1120)  SpO2: 100 % (05/13/23 1120) Vital Signs (24h Range):  Temp:  [97 °F (36.1 °C)-98.6 °F (37 °C)] 98.1 °F (36.7 °C)  Pulse:  [55-86] 56  Resp:  [16-31] 17  SpO2:  [96 %-100 %] 100 %  BP: ()/(53-80) 123/61     Weight: 63.5 kg (140 lb)  Body mass index is 22.6 kg/m².    Intake/Output Summary (Last 24 hours) at 5/13/2023 1258  Last data filed at 5/13/2023 1244  Gross per 24 hour   Intake 219.52 ml   Output 300 ml   Net -80.48 ml         Physical Exam  Constitutional:       General: He is not in acute distress.     Appearance: He is not ill-appearing, toxic-appearing or diaphoretic.   Cardiovascular:      Rate and Rhythm: Normal rate and regular rhythm.      Heart sounds: No murmur heard.    No gallop.   Pulmonary:      Effort: No respiratory distress.      Breath sounds: Normal breath sounds. No wheezing or rales.   Abdominal:      General: Bowel sounds are normal. There is no distension.      Palpations: Abdomen is soft.      Tenderness: There is no abdominal tenderness.           Significant Labs: All pertinent labs within the past 24 hours have been reviewed.    Significant Imaging: I have reviewed all pertinent imaging results/findings within the past 24 hours.

## 2023-05-13 NOTE — CONSULTS
"[facility] Tele-Consultation from Neurology    Consultation started: 5/13/2023 at 2:35 PM   The chief complaint leading to consultation is: seizure  This consultation was requested by:    The patient location is: [place] [facility]  The patient arrived at: 2:35pm  Spoke nurse at bedside with patient assisting consultant. Also present with the patient at the time of the consultation:None    Inpatient consult to Telemedicine-General Neurology  Consult performed by: Chantel Perez MD  Consult ordered by: Sriram Zambrano MD  Reason for consult: seizures  Assessment/Recommendations: Alcohol abuse  Seizure disorder    Recommend  Start keppra 500mg bid. Please give 1 month med with 1 refill.  Seizure education and precautions given  Advised to abstain from driving, swimming, climbing ladders, etc for the next 3 months.  Needs neurology outpatient consult for follow up.   Would recommend outpatient alcohol cessation program if patient willing to attend. Currently resistant to discontinuing alcohol use.         Subjective:     History of Present Illness:  Paty Jiménez is a 62 y.o. male who presents with seizure.    Last thing he remembers is being dropped off at home. Does not remember the event. Last seizure in April, in the setting of alcohol withdrawal.   Never wakes up with tongue bites or urinary incontinence. No bruises or injuries that he can't explain.    Has history of two prior seizure episodes.       Currently drinks about "2-3 quartz", 32oz can. Has been drinking this much for the past few weeks.  Previously drank about a 12 pack a day.  Last drink was just prior this admission.    No head injuries, no childhood seizures, no h/o meningitis. No recent infections.  No recent medication changed. Does take a vitamin at home.  No drug use.    Patient information was obtained from patient, past medical records, ER records, and primary team.    Past Medical History:   Diagnosis Date    Seizures        History " reviewed. No pertinent surgical history.    Family History   Problem Relation Age of Onset    Cancer Mother     Cancer Father         liver        Social History     Tobacco Use    Smoking status: Every Day     Types: Cigarettes    Smokeless tobacco: Not on file   Substance Use Topics    Alcohol use: Yes     Comment: 3 quarts of beer daily    Only smoke about a 1pk of cigarettes per week.    Medications:   Current Facility-Administered Medications:     enoxaparin injection 40 mg, 40 mg, Subcutaneous, Daily, Sriram Zambrano MD    folic acid tablet 1 mg, 1 mg, Oral, Daily, Sriram Zambrano MD, 1 mg at 05/13/23 0929    folic acid-vit B6-vit B12 2.5-25-2 mg tablet 1 tablet, 1 tablet, Oral, Daily, Sriram Zambrano MD, 1 tablet at 05/13/23 0929    hydrOXYzine pamoate capsule 50 mg, 50 mg, Oral, Q4H PRN, Sriram Zambrano MD    levETIRAcetam injection 750 mg, 750 mg, Intravenous, Q12H, Sriram Zambrano MD, 750 mg at 05/13/23 0928    LORazepam injection 1 mg, 1 mg, Intravenous, Q4H PRN, Sriram Zambrano MD    LORazepam injection 2 mg, 2 mg, Intravenous, Q4H PRN, Sriram Zambrano MD    melatonin tablet 6 mg, 6 mg, Oral, Nightly, Sriram Zambrano MD, 6 mg at 05/12/23 2134    multivitamin tablet, 1 tablet, Oral, Daily, Sriram Zambrano MD, 1 tablet at 05/13/23 0929    thiamine (B-1) 500 mg in dextrose 5 % (D5W) 100 mL IVPB, 500 mg, Intravenous, TID, Sriram Zambrano MD, Last Rate: 50 mL/hr at 05/13/23 1421, 500 mg at 05/13/23 1421    Allergies: Review of patient's allergies indicates:  No Known Allergies    Review Of Systems: Review of Systems   Constitutional:  Negative for chills and fever.   HENT:  Negative for sore throat.    Respiratory:  Negative for shortness of breath.    Cardiovascular:  Negative for chest pain.   Gastrointestinal:  Negative for abdominal pain.   Neurological:  Positive for seizures.   Psychiatric/Behavioral:  Positive for substance abuse.        Objective:     Vitals: Blood pressure 123/61, pulse (!)  "56, temperature 98.1 °F (36.7 °C), temperature source Oral, resp. rate 17, height 5' 6" (1.676 m), weight 63.5 kg (140 lb), SpO2 100 %. Reviewed for date of service     Constitutional: Well-developed, well-nourished, appears stated age    Neurological:    Mental status: Alert and oriented to person, place, time, and situation; no dysarthria; no aphasia; normal recent and remote memory; follows commands  Cranial nerves:   CN II: Pupils 3--2 OU   CN III, IV, VI: Extraocular movements full, no nystagmus visualized  CN V: Symmetric sensation to touch   CN VII: Face strong and symmetric bilaterally   CN VIII: Hearing grossly intact   CN IX, X: Palate raises midline and symmetric   CN XI: Strong shoulder shrug B   CN XII: Tongue appears midline   Motor: 5/5 power, normal bulk by appearance, no drift   Sensory: Intact to touch in all four extremities   Coordination: No dysmetria or tremor with outstretched hands   Gait: Not OOB  Deep tendon reflexes: DTRs 2+ and symmetric throughout, toes downgoing on Babinski testing      Labs:Reviewed for date of service  Radiology (i.e. PET Scan, X-ray, CT, MRI): Reviewed for date of service    Assessment - Diagnosis - Goals:     Impression: Paty Jiménez 62 y.o. male with seizure.    Recommendations: see above    Consultation ended:  3:22 PM     Total time spent with patient: > 70 Minutes      The attending portion of this evaluation, treatment, and documentation was performed per Chantel Perez MD via audiovisual.        Thank you for this consult. Please do not hesitate to contact us with questions.    "

## 2023-05-14 VITALS
RESPIRATION RATE: 20 BRPM | HEART RATE: 53 BPM | WEIGHT: 140 LBS | HEIGHT: 66 IN | TEMPERATURE: 98 F | OXYGEN SATURATION: 100 % | SYSTOLIC BLOOD PRESSURE: 147 MMHG | DIASTOLIC BLOOD PRESSURE: 71 MMHG | BODY MASS INDEX: 22.5 KG/M2

## 2023-05-14 PROCEDURE — 63600175 PHARM REV CODE 636 W HCPCS: Performed by: STUDENT IN AN ORGANIZED HEALTH CARE EDUCATION/TRAINING PROGRAM

## 2023-05-14 PROCEDURE — 25000003 PHARM REV CODE 250: Performed by: STUDENT IN AN ORGANIZED HEALTH CARE EDUCATION/TRAINING PROGRAM

## 2023-05-14 RX ORDER — LEVETIRACETAM 500 MG/1
500 TABLET ORAL 2 TIMES DAILY
Status: DISCONTINUED | OUTPATIENT
Start: 2023-05-14 | End: 2023-05-14 | Stop reason: HOSPADM

## 2023-05-14 RX ORDER — LEVETIRACETAM 250 MG/1
500 TABLET ORAL 2 TIMES DAILY
Qty: 120 TABLET | Refills: 3 | Status: SHIPPED | OUTPATIENT
Start: 2023-05-14 | End: 2023-09-07 | Stop reason: SDUPTHER

## 2023-05-14 RX ADMIN — THERA TABS 1 TABLET: TAB at 09:05

## 2023-05-14 RX ADMIN — Medication 1 TABLET: at 09:05

## 2023-05-14 RX ADMIN — THIAMINE HYDROCHLORIDE 500 MG: 100 INJECTION, SOLUTION INTRAMUSCULAR; INTRAVENOUS at 09:05

## 2023-05-14 RX ADMIN — LEVETIRACETAM 500 MG: 500 TABLET, FILM COATED ORAL at 09:05

## 2023-05-14 RX ADMIN — FOLIC ACID 1 MG: 1 TABLET ORAL at 09:05

## 2023-05-14 NOTE — NURSING
Pt discharged per MD order. IV removed. Catheter tip intact. No distress noted. Discharge instructions reviewed with pt and family. Given the opportunity for questions. All questions answered. AVS given to pt and placed in blue folder. Patient verbalized understanding of all instructions. Vitals per chart. afebrile. No complaints of pain, N/V, diarrhea, or SOB. Pt left unit via wheelchair transport. Will take cab home.

## 2023-05-14 NOTE — NURSING
[facility]  Nurses Note -- 4 Eyes      5/14/2023       Skin assessed on: Q Shift      [x] No Pressure Injuries Present    []Prevention Measures Documented    [] Yes LDA  for Pressure Injury Previously documented     [] Yes New Pressure Injury Discovered   [] LDA for New Pressure Injury Added      Attending RN:  Keila Weiner LPN     Second RN:  Krishna Silva RN

## 2023-05-14 NOTE — DISCHARGE SUMMARY
[facility]  Discharge Summary      Patient Name: Paty Jiménez  MRN: [id number]  RAPHAEL: [id number]  Patient Class: IP- Inpatient  Admission Date: 5/12/2023  Hospital Length of Stay: 2 days  Discharge Date and Time: No discharge date for patient encounter.  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 The patient was unable to provide coherent history at the time of examination. Per ED report, the patient was noted to have a seizure at home and had a tonic clonic seizure in the ED. He has prior hx of seizures and was not prescribed AEDs at discharge as they were thought to be from EtOH withdrawal. The patient reportedly drinks daily.       * No surgery found *      Hospital Course:   Spot EEG without seizures. No observed seizures during his hospital course. No evidence of alcohol withdrawal. Neurology not available on site, but teleneurology was consulted and felt that the seizures were likely due to EtOH but given uncertainty and recurrent seizures they recommended keppra w/ neurology follow up. Pt given seizure precautions. All questions answered prior to dc, pt in agreement with plan.        Goals of Care Treatment Preferences:  Code Status: Full Code      Consults:   Consults (From admission, onward)        Status Ordering Provider     Inpatient consult to Telemedicine-General Neurology  Once        Provider:  Chantel Perez MD    Completed ZO ZAMBRANO          No new Assessment & Plan notes have been filed under this hospital service since the last note was generated.  Service: Hospital Medicine    Final Active Diagnoses:    Diagnosis Date Noted POA    PRINCIPAL PROBLEM:  Seizure [R56.9] 01/22/2023 Yes    Hypertension [I10]  01/23/2023 Yes    Alcohol abuse [F10.10] 07/15/2022 Yes    Tobacco use disorder [F17.200] 07/15/2022 Yes      Problems Resolved During this Admission:       Discharged Condition: good    Disposition: Home or Self Care    Follow Up:   Follow-up Information     [facility]. Schedule an appointment as soon as possible for a visit in 1 week(s).    Why: for Hospital Follow up  Contact information:  [address] [facility] [address] [place] [address]  [phone]                       Patient Instructions:      Ambulatory referral/consult to Neurology   Standing Status: Future   Referral Priority: Routine Referral Type: Consultation   Referral Reason: Specialty Services Required   Requested Specialty: Neurology   Number of Visits Requested: 1       Significant Diagnostic Studies: N/A  As above    Pending Diagnostic Studies:     None         Medications:  Reconciled Home Medications:      Medication List      CHANGE how you take these medications    levETIRAcetam 250 MG Tab  Commonly known as: KEPPRA  Take 2 tablets (500 mg total) by mouth 2 (two) times daily.  What changed: how much to take        STOP taking these medications    diazePAM 5 MG tablet  Commonly known as: VALIUM            Indwelling Lines/Drains at time of discharge:   Lines/Drains/Airways     None                 Time spent on the discharge of patient: 45 minutes         Sriram Zambrano MD  Department of Hospital Medicine  [facility]

## 2023-05-14 NOTE — PLAN OF CARE
NurseAnnmarie notified that pt is clear to DC from a CM standpoint.       05/14/23 0806   Final Note   Assessment Type Final Discharge Note   Anticipated Discharge Disposition Home   Hospital Resources/Appts/Education Provided   (Patient has no PCP and must self schedule at [facility])   Post-Acute Status   Post-Acute Authorization Other   Other Status No Post-Acute Service Needs   Discharge Delays None known at this time

## 2023-05-14 NOTE — PLAN OF CARE
05/14/23 0802   Discharge Assessment   Assessment Type Discharge Planning Assessment     Patient with active DC order prior to completion of DC needs assessment

## 2023-07-08 ENCOUNTER — HOSPITAL ENCOUNTER (INPATIENT)
Facility: HOSPITAL | Age: 63
LOS: 6 days | Discharge: HOME | DRG: 100 | End: 2023-07-14
Attending: EMERGENCY MEDICINE | Admitting: PSYCHIATRY & NEUROLOGY
Payer: MEDICAID

## 2023-07-08 DIAGNOSIS — I50.21 ACUTE SYSTOLIC HEART FAILURE: ICD-10-CM

## 2023-07-08 DIAGNOSIS — A41.9 SEPSIS, DUE TO UNSPECIFIED ORGANISM, UNSPECIFIED WHETHER ACUTE ORGAN DYSFUNCTION PRESENT: ICD-10-CM

## 2023-07-08 DIAGNOSIS — G93.40 ENCEPHALOPATHY ACUTE: ICD-10-CM

## 2023-07-08 DIAGNOSIS — R07.9 CHEST PAIN: ICD-10-CM

## 2023-07-08 DIAGNOSIS — R74.01 TRANSAMINITIS: ICD-10-CM

## 2023-07-08 DIAGNOSIS — I21.02 ST ELEVATION MYOCARDIAL INFARCTION INVOLVING LEFT ANTERIOR DESCENDING (LAD) CORONARY ARTERY: ICD-10-CM

## 2023-07-08 DIAGNOSIS — R56.9 ALCOHOL WITHDRAWAL SEIZURE WITH DELIRIUM: ICD-10-CM

## 2023-07-08 DIAGNOSIS — R65.10 SIRS (SYSTEMIC INFLAMMATORY RESPONSE SYNDROME): ICD-10-CM

## 2023-07-08 DIAGNOSIS — R79.89 ELEVATED TROPONIN: ICD-10-CM

## 2023-07-08 DIAGNOSIS — F10.931 DELIRIUM TREMENS: ICD-10-CM

## 2023-07-08 DIAGNOSIS — F14.90 COCAINE USE: ICD-10-CM

## 2023-07-08 DIAGNOSIS — F10.20 ALCOHOL USE DISORDER, SEVERE, DEPENDENCE: ICD-10-CM

## 2023-07-08 DIAGNOSIS — R56.9 SEIZURE: ICD-10-CM

## 2023-07-08 DIAGNOSIS — I10 PRIMARY HYPERTENSION: Chronic | ICD-10-CM

## 2023-07-08 DIAGNOSIS — F10.931 ALCOHOL WITHDRAWAL SEIZURE WITH DELIRIUM: ICD-10-CM

## 2023-07-08 DIAGNOSIS — F19.90 SUBSTANCE USE DISORDER: Chronic | ICD-10-CM

## 2023-07-08 DIAGNOSIS — I21.3 ST ELEVATION MYOCARDIAL INFARCTION (STEMI), UNSPECIFIED ARTERY: ICD-10-CM

## 2023-07-08 DIAGNOSIS — R56.9 SEIZURES: ICD-10-CM

## 2023-07-08 DIAGNOSIS — T67.01XA: ICD-10-CM

## 2023-07-08 DIAGNOSIS — F10.10 ALCOHOL ABUSE: Chronic | ICD-10-CM

## 2023-07-08 DIAGNOSIS — R79.89 ELEVATED TROPONIN I LEVEL: ICD-10-CM

## 2023-07-08 DIAGNOSIS — R41.82 ALTERED MENTAL STATUS, UNSPECIFIED ALTERED MENTAL STATUS TYPE: Primary | ICD-10-CM

## 2023-07-08 DIAGNOSIS — E87.1 HYPONATREMIA: ICD-10-CM

## 2023-07-08 DIAGNOSIS — D69.6 THROMBOCYTOPENIA: ICD-10-CM

## 2023-07-08 LAB
ALBUMIN SERPL BCP-MCNC: 3.8 G/DL (ref 3.5–5.2)
ALLENS TEST: ABNORMAL
ALP SERPL-CCNC: 132 U/L (ref 55–135)
ALT SERPL W/O P-5'-P-CCNC: 65 U/L (ref 10–44)
AMPHET+METHAMPHET UR QL: NEGATIVE
ANION GAP SERPL CALC-SCNC: 14 MMOL/L (ref 8–16)
ANION GAP SERPL CALC-SCNC: 20 MMOL/L (ref 8–16)
APTT PPP: 24.8 SEC (ref 21–32)
AST SERPL-CCNC: 139 U/L (ref 10–40)
BARBITURATES UR QL SCN>200 NG/ML: NEGATIVE
BASOPHILS # BLD AUTO: 0.03 K/UL (ref 0–0.2)
BASOPHILS NFR BLD: 0.5 % (ref 0–1.9)
BENZODIAZ UR QL SCN>200 NG/ML: NEGATIVE
BILIRUB SERPL-MCNC: 1.1 MG/DL (ref 0.1–1)
BILIRUB UR QL STRIP: NEGATIVE
BUN SERPL-MCNC: 7 MG/DL (ref 6–30)
BUN SERPL-MCNC: 8 MG/DL (ref 8–23)
BZE UR QL SCN: ABNORMAL
CALCIUM SERPL-MCNC: 9.1 MG/DL (ref 8.7–10.5)
CANNABINOIDS UR QL SCN: ABNORMAL
CHLORIDE SERPL-SCNC: 100 MMOL/L (ref 95–110)
CHLORIDE SERPL-SCNC: 102 MMOL/L (ref 95–110)
CK SERPL-CCNC: 322 U/L (ref 20–200)
CK SERPL-CCNC: 609 U/L (ref 20–200)
CLARITY CSF: CLEAR
CLARITY UR: CLEAR
CO2 SERPL-SCNC: 21 MMOL/L (ref 23–29)
COLOR CSF: COLORLESS
COLOR UR: YELLOW
CREAT SERPL-MCNC: 1 MG/DL (ref 0.5–1.4)
CREAT SERPL-MCNC: 1 MG/DL (ref 0.5–1.4)
CREAT UR-MCNC: 48.8 MG/DL (ref 23–375)
CSF TUBE NUMBER: 1
CSF TUBE NUMBER: 1
DIFFERENTIAL METHOD: ABNORMAL
EOSINOPHIL # BLD AUTO: 0 K/UL (ref 0–0.5)
EOSINOPHIL NFR BLD: 0 % (ref 0–8)
ERYTHROCYTE [DISTWIDTH] IN BLOOD BY AUTOMATED COUNT: 14.4 % (ref 11.5–14.5)
EST. GFR  (NO RACE VARIABLE): >60 ML/MIN/1.73 M^2
ETHANOL SERPL-MCNC: <10 MG/DL
ETHANOL SERPL-MCNC: <10 MG/DL
GLUCOSE CSF-MCNC: 82 MG/DL (ref 40–70)
GLUCOSE SERPL-MCNC: 106 MG/DL (ref 70–110)
GLUCOSE SERPL-MCNC: 108 MG/DL (ref 70–110)
GLUCOSE UR QL STRIP: NEGATIVE
HCO3 UR-SCNC: 24.4 MMOL/L (ref 24–28)
HCT VFR BLD AUTO: 33.5 % (ref 40–54)
HCT VFR BLD CALC: 39 %PCV (ref 36–54)
HGB BLD-MCNC: 11.8 G/DL (ref 14–18)
HGB UR QL STRIP: ABNORMAL
IMM GRANULOCYTES # BLD AUTO: 0.01 K/UL (ref 0–0.04)
IMM GRANULOCYTES NFR BLD AUTO: 0.2 % (ref 0–0.5)
INR PPP: 1.2 (ref 0.8–1.2)
INR PPP: 1.3 (ref 0.8–1.2)
KETONES UR QL STRIP: NEGATIVE
LACTATE SERPL-SCNC: 3.3 MMOL/L (ref 0.5–2.2)
LACTATE SERPL-SCNC: 3.9 MMOL/L (ref 0.5–2.2)
LDH SERPL L TO P-CCNC: 3.52 MMOL/L (ref 0.5–2.2)
LEUKOCYTE ESTERASE UR QL STRIP: NEGATIVE
LIPASE SERPL-CCNC: 24 U/L (ref 4–60)
LYMPHOCYTES # BLD AUTO: 0.9 K/UL (ref 1–4.8)
LYMPHOCYTES NFR BLD: 14.6 % (ref 18–48)
MCH RBC QN AUTO: 30.5 PG (ref 27–31)
MCHC RBC AUTO-ENTMCNC: 35.2 G/DL (ref 32–36)
MCV RBC AUTO: 87 FL (ref 82–98)
METHADONE UR QL SCN>300 NG/ML: NEGATIVE
MICROSCOPIC COMMENT: NORMAL
MONOCYTES # BLD AUTO: 0.6 K/UL (ref 0.3–1)
MONOCYTES NFR BLD: 9.6 % (ref 4–15)
NEUTROPHILS # BLD AUTO: 4.7 K/UL (ref 1.8–7.7)
NEUTROPHILS NFR BLD: 75.1 % (ref 38–73)
NITRITE UR QL STRIP: NEGATIVE
NRBC BLD-RTO: 0 /100 WBC
OPIATES UR QL SCN: NEGATIVE
PCO2 BLDA: 38.6 MMHG (ref 35–45)
PCP UR QL SCN>25 NG/ML: NEGATIVE
PH SMN: 7.41 [PH] (ref 7.35–7.45)
PH UR STRIP: 6 [PH] (ref 5–8)
PLATELET # BLD AUTO: 108 K/UL (ref 150–450)
PMV BLD AUTO: 10.5 FL (ref 9.2–12.9)
PO2 BLDA: 18 MMHG (ref 40–60)
POC BE: 0 MMOL/L
POC IONIZED CALCIUM: 1.15 MMOL/L (ref 1.06–1.42)
POC SATURATED O2: 28 % (ref 95–100)
POC TCO2 (MEASURED): 24 MMOL/L (ref 23–29)
POC TCO2: 26 MMOL/L (ref 24–29)
POCT GLUCOSE: 166 MG/DL (ref 70–110)
POTASSIUM BLD-SCNC: 4.2 MMOL/L (ref 3.5–5.1)
POTASSIUM SERPL-SCNC: 4.2 MMOL/L (ref 3.5–5.1)
PROCALCITONIN SERPL IA-MCNC: 1.67 NG/ML
PROCALCITONIN SERPL IA-MCNC: 4.19 NG/ML
PROT CSF-MCNC: 42 MG/DL (ref 15–40)
PROT SERPL-MCNC: 7.9 G/DL (ref 6–8.4)
PROT UR QL STRIP: NEGATIVE
PROTHROMBIN TIME: 12.4 SEC (ref 9–12.5)
PROTHROMBIN TIME: 13.1 SEC (ref 9–12.5)
RBC # BLD AUTO: 3.87 M/UL (ref 4.6–6.2)
RBC # CSF: 80 /CU MM
RBC #/AREA URNS HPF: 4 /HPF (ref 0–4)
SALICYLATES SERPL-MCNC: <5 MG/DL (ref 15–30)
SAMPLE: ABNORMAL
SAMPLE: ABNORMAL
SITE: ABNORMAL
SODIUM BLD-SCNC: 138 MMOL/L (ref 136–145)
SODIUM SERPL-SCNC: 137 MMOL/L (ref 136–145)
SP GR UR STRIP: 1.01 (ref 1–1.03)
SPECIMEN VOL CSF: ABNORMAL ML
T4 FREE SERPL-MCNC: 0.91 NG/DL (ref 0.71–1.51)
TOXICOLOGY INFORMATION: ABNORMAL
TROPONIN I SERPL DL<=0.01 NG/ML-MCNC: 1.72 NG/ML (ref 0–0.03)
TROPONIN I SERPL DL<=0.01 NG/ML-MCNC: 4.73 NG/ML (ref 0–0.03)
TROPONIN I SERPL DL<=0.01 NG/ML-MCNC: 5.87 NG/ML (ref 0–0.03)
TSH SERPL DL<=0.005 MIU/L-ACNC: 0.34 UIU/ML (ref 0.4–4)
URN SPEC COLLECT METH UR: ABNORMAL
UROBILINOGEN UR STRIP-ACNC: NEGATIVE EU/DL
WBC # BLD AUTO: 6.22 K/UL (ref 3.9–12.7)
WBC # CSF: 1 /CU MM (ref 0–5)
WBC #/AREA URNS HPF: 2 /HPF (ref 0–5)

## 2023-07-08 PROCEDURE — 84484 ASSAY OF TROPONIN QUANT: CPT | Mod: 91

## 2023-07-08 PROCEDURE — 84443 ASSAY THYROID STIM HORMONE: CPT | Performed by: EMERGENCY MEDICINE

## 2023-07-08 PROCEDURE — 82550 ASSAY OF CK (CPK): CPT | Performed by: EMERGENCY MEDICINE

## 2023-07-08 PROCEDURE — 96361 HYDRATE IV INFUSION ADD-ON: CPT

## 2023-07-08 PROCEDURE — 84439 ASSAY OF FREE THYROXINE: CPT | Performed by: EMERGENCY MEDICINE

## 2023-07-08 PROCEDURE — 84157 ASSAY OF PROTEIN OTHER: CPT | Performed by: EMERGENCY MEDICINE

## 2023-07-08 PROCEDURE — 83605 ASSAY OF LACTIC ACID: CPT | Mod: 91

## 2023-07-08 PROCEDURE — 95720 EEG PHY/QHP EA INCR W/VEEG: CPT | Mod: ,,, | Performed by: PSYCHIATRY & NEUROLOGY

## 2023-07-08 PROCEDURE — 82077 ASSAY SPEC XCP UR&BREATH IA: CPT | Performed by: EMERGENCY MEDICINE

## 2023-07-08 PROCEDURE — 81001 URINALYSIS AUTO W/SCOPE: CPT

## 2023-07-08 PROCEDURE — 93010 EKG 12-LEAD: ICD-10-PCS | Mod: ,,, | Performed by: INTERNAL MEDICINE

## 2023-07-08 PROCEDURE — 27000207 HC ISOLATION

## 2023-07-08 PROCEDURE — 80053 COMPREHEN METABOLIC PANEL: CPT | Performed by: EMERGENCY MEDICINE

## 2023-07-08 PROCEDURE — 84145 PROCALCITONIN (PCT): CPT | Mod: 91

## 2023-07-08 PROCEDURE — 84484 ASSAY OF TROPONIN QUANT: CPT | Mod: 91 | Performed by: EMERGENCY MEDICINE

## 2023-07-08 PROCEDURE — 96367 TX/PROPH/DG ADDL SEQ IV INF: CPT | Mod: 59

## 2023-07-08 PROCEDURE — 80179 DRUG ASSAY SALICYLATE: CPT | Performed by: EMERGENCY MEDICINE

## 2023-07-08 PROCEDURE — 93005 ELECTROCARDIOGRAM TRACING: CPT

## 2023-07-08 PROCEDURE — 87498 ENTEROVIRUS PROBE&REVRS TRNS: CPT | Performed by: EMERGENCY MEDICINE

## 2023-07-08 PROCEDURE — 84295 ASSAY OF SERUM SODIUM: CPT

## 2023-07-08 PROCEDURE — 99291 PR CRITICAL CARE, E/M 30-74 MINUTES: ICD-10-PCS | Mod: ,,,

## 2023-07-08 PROCEDURE — 82565 ASSAY OF CREATININE: CPT

## 2023-07-08 PROCEDURE — 93010 ELECTROCARDIOGRAM REPORT: CPT | Mod: 76,,, | Performed by: INTERNAL MEDICINE

## 2023-07-08 PROCEDURE — 96365 THER/PROPH/DIAG IV INF INIT: CPT | Mod: 59

## 2023-07-08 PROCEDURE — 87529 HSV DNA AMP PROBE: CPT | Performed by: EMERGENCY MEDICINE

## 2023-07-08 PROCEDURE — 25000003 PHARM REV CODE 250

## 2023-07-08 PROCEDURE — 80307 DRUG TEST PRSMV CHEM ANLYZR: CPT | Performed by: EMERGENCY MEDICINE

## 2023-07-08 PROCEDURE — 85014 HEMATOCRIT: CPT

## 2023-07-08 PROCEDURE — 82077 ASSAY SPEC XCP UR&BREATH IA: CPT | Mod: 91

## 2023-07-08 PROCEDURE — 87798 DETECT AGENT NOS DNA AMP: CPT | Performed by: EMERGENCY MEDICINE

## 2023-07-08 PROCEDURE — 85610 PROTHROMBIN TIME: CPT | Mod: 91 | Performed by: EMERGENCY MEDICINE

## 2023-07-08 PROCEDURE — 99291 CRITICAL CARE FIRST HOUR: CPT

## 2023-07-08 PROCEDURE — 87205 SMEAR GRAM STAIN: CPT | Performed by: EMERGENCY MEDICINE

## 2023-07-08 PROCEDURE — 82945 GLUCOSE OTHER FLUID: CPT | Performed by: EMERGENCY MEDICINE

## 2023-07-08 PROCEDURE — 96375 TX/PRO/DX INJ NEW DRUG ADDON: CPT

## 2023-07-08 PROCEDURE — 93010 EKG 12-LEAD: ICD-10-PCS | Mod: 76,,, | Performed by: INTERNAL MEDICINE

## 2023-07-08 PROCEDURE — 99000 SPECIMEN HANDLING OFFICE-LAB: CPT | Performed by: EMERGENCY MEDICINE

## 2023-07-08 PROCEDURE — 80321 ALCOHOLS BIOMARKERS 1OR 2: CPT

## 2023-07-08 PROCEDURE — 83605 ASSAY OF LACTIC ACID: CPT

## 2023-07-08 PROCEDURE — 63600175 PHARM REV CODE 636 W HCPCS

## 2023-07-08 PROCEDURE — 87449 NOS EACH ORGANISM AG IA: CPT | Performed by: EMERGENCY MEDICINE

## 2023-07-08 PROCEDURE — 83605 ASSAY OF LACTIC ACID: CPT | Performed by: EMERGENCY MEDICINE

## 2023-07-08 PROCEDURE — 51702 INSERT TEMP BLADDER CATH: CPT

## 2023-07-08 PROCEDURE — 83690 ASSAY OF LIPASE: CPT | Performed by: EMERGENCY MEDICINE

## 2023-07-08 PROCEDURE — 99292 PR CRITICAL CARE, ADDL 30 MIN: ICD-10-PCS | Mod: ,,,

## 2023-07-08 PROCEDURE — 25000003 PHARM REV CODE 250: Performed by: PSYCHIATRY & NEUROLOGY

## 2023-07-08 PROCEDURE — 25000003 PHARM REV CODE 250: Performed by: EMERGENCY MEDICINE

## 2023-07-08 PROCEDURE — 84145 PROCALCITONIN (PCT): CPT | Performed by: EMERGENCY MEDICINE

## 2023-07-08 PROCEDURE — 20000000 HC ICU ROOM

## 2023-07-08 PROCEDURE — 87040 BLOOD CULTURE FOR BACTERIA: CPT | Performed by: EMERGENCY MEDICINE

## 2023-07-08 PROCEDURE — 81000 URINALYSIS NONAUTO W/SCOPE: CPT | Mod: 59 | Performed by: EMERGENCY MEDICINE

## 2023-07-08 PROCEDURE — 82550 ASSAY OF CK (CPK): CPT | Mod: 91

## 2023-07-08 PROCEDURE — 89051 BODY FLUID CELL COUNT: CPT | Performed by: EMERGENCY MEDICINE

## 2023-07-08 PROCEDURE — 99291 CRITICAL CARE FIRST HOUR: CPT | Mod: ,,,

## 2023-07-08 PROCEDURE — 82330 ASSAY OF CALCIUM: CPT

## 2023-07-08 PROCEDURE — 84132 ASSAY OF SERUM POTASSIUM: CPT

## 2023-07-08 PROCEDURE — 99900035 HC TECH TIME PER 15 MIN (STAT)

## 2023-07-08 PROCEDURE — 85025 COMPLETE CBC W/AUTO DIFF WBC: CPT | Performed by: EMERGENCY MEDICINE

## 2023-07-08 PROCEDURE — 87040 BLOOD CULTURE FOR BACTERIA: CPT | Mod: 59

## 2023-07-08 PROCEDURE — 93010 ELECTROCARDIOGRAM REPORT: CPT | Mod: 59,,, | Performed by: INTERNAL MEDICINE

## 2023-07-08 PROCEDURE — 85730 THROMBOPLASTIN TIME PARTIAL: CPT | Performed by: EMERGENCY MEDICINE

## 2023-07-08 PROCEDURE — 87070 CULTURE OTHR SPECIMN AEROBIC: CPT | Performed by: EMERGENCY MEDICINE

## 2023-07-08 PROCEDURE — 95720 PR EEG, W/VIDEO, CONT RECORD, I&R, >12<26 HRS: ICD-10-PCS | Mod: ,,, | Performed by: PSYCHIATRY & NEUROLOGY

## 2023-07-08 PROCEDURE — 62272 THER SPI PNXR DRG CSF: CPT

## 2023-07-08 PROCEDURE — P9612 CATHETERIZE FOR URINE SPEC: HCPCS

## 2023-07-08 PROCEDURE — 80047 BASIC METABLC PNL IONIZED CA: CPT

## 2023-07-08 PROCEDURE — 93010 ELECTROCARDIOGRAM REPORT: CPT | Mod: ,,, | Performed by: INTERNAL MEDICINE

## 2023-07-08 PROCEDURE — 63600175 PHARM REV CODE 636 W HCPCS: Performed by: EMERGENCY MEDICINE

## 2023-07-08 PROCEDURE — 99292 CRITICAL CARE ADDL 30 MIN: CPT | Mod: ,,,

## 2023-07-08 RX ORDER — LEVETIRACETAM 500 MG/5ML
1000 INJECTION, SOLUTION, CONCENTRATE INTRAVENOUS EVERY 12 HOURS
Status: DISCONTINUED | OUTPATIENT
Start: 2023-07-08 | End: 2023-07-09

## 2023-07-08 RX ORDER — LORAZEPAM 2 MG/ML
2 INJECTION INTRAMUSCULAR
Status: COMPLETED | OUTPATIENT
Start: 2023-07-08 | End: 2023-07-08

## 2023-07-08 RX ORDER — PROCHLORPERAZINE EDISYLATE 5 MG/ML
5 INJECTION INTRAMUSCULAR; INTRAVENOUS EVERY 6 HOURS PRN
Status: DISCONTINUED | OUTPATIENT
Start: 2023-07-08 | End: 2023-07-12

## 2023-07-08 RX ORDER — CLOPIDOGREL 300 MG/1
600 TABLET, FILM COATED ORAL ONCE
Status: COMPLETED | OUTPATIENT
Start: 2023-07-09 | End: 2023-07-08

## 2023-07-08 RX ORDER — POLYETHYLENE GLYCOL 3350 17 G/17G
17 POWDER, FOR SOLUTION ORAL DAILY
Status: DISCONTINUED | OUTPATIENT
Start: 2023-07-09 | End: 2023-07-14 | Stop reason: HOSPADM

## 2023-07-08 RX ORDER — LIDOCAINE HYDROCHLORIDE 10 MG/ML
1 INJECTION INFILTRATION; PERINEURAL ONCE
Status: COMPLETED | OUTPATIENT
Start: 2023-07-08 | End: 2023-07-08

## 2023-07-08 RX ORDER — MIDAZOLAM HYDROCHLORIDE 1 MG/ML
2 INJECTION INTRAMUSCULAR; INTRAVENOUS
Status: COMPLETED | OUTPATIENT
Start: 2023-07-08 | End: 2023-07-08

## 2023-07-08 RX ORDER — ACETAMINOPHEN 650 MG/1
650 SUPPOSITORY RECTAL
Status: COMPLETED | OUTPATIENT
Start: 2023-07-08 | End: 2023-07-08

## 2023-07-08 RX ORDER — ONDANSETRON 2 MG/ML
4 INJECTION INTRAMUSCULAR; INTRAVENOUS EVERY 8 HOURS PRN
Status: DISCONTINUED | OUTPATIENT
Start: 2023-07-08 | End: 2023-07-12

## 2023-07-08 RX ORDER — THIAMINE HCL 100 MG
100 TABLET ORAL DAILY
Status: DISCONTINUED | OUTPATIENT
Start: 2023-07-09 | End: 2023-07-14 | Stop reason: HOSPADM

## 2023-07-08 RX ORDER — SODIUM CHLORIDE 0.9 % (FLUSH) 0.9 %
10 SYRINGE (ML) INJECTION
Status: DISCONTINUED | OUTPATIENT
Start: 2023-07-08 | End: 2023-07-14 | Stop reason: HOSPADM

## 2023-07-08 RX ORDER — DIAZEPAM 10 MG/2ML
5 INJECTION INTRAMUSCULAR ONCE
Status: COMPLETED | OUTPATIENT
Start: 2023-07-09 | End: 2023-07-08

## 2023-07-08 RX ORDER — DIAZEPAM 10 MG/2ML
2.5 INJECTION INTRAMUSCULAR EVERY 4 HOURS PRN
Status: DISCONTINUED | OUTPATIENT
Start: 2023-07-08 | End: 2023-07-08

## 2023-07-08 RX ORDER — MUPIROCIN 20 MG/G
OINTMENT TOPICAL 2 TIMES DAILY
Status: DISPENSED | OUTPATIENT
Start: 2023-07-08 | End: 2023-07-13

## 2023-07-08 RX ORDER — DEXMEDETOMIDINE HYDROCHLORIDE 4 UG/ML
0-1.4 INJECTION, SOLUTION INTRAVENOUS CONTINUOUS
Status: DISCONTINUED | OUTPATIENT
Start: 2023-07-08 | End: 2023-07-08

## 2023-07-08 RX ORDER — DIPHENHYDRAMINE HYDROCHLORIDE 50 MG/ML
25 INJECTION INTRAMUSCULAR; INTRAVENOUS
Status: COMPLETED | OUTPATIENT
Start: 2023-07-08 | End: 2023-07-08

## 2023-07-08 RX ORDER — HEPARIN SODIUM,PORCINE/D5W 25000/250
0-40 INTRAVENOUS SOLUTION INTRAVENOUS CONTINUOUS
Status: DISCONTINUED | OUTPATIENT
Start: 2023-07-08 | End: 2023-07-08

## 2023-07-08 RX ORDER — ACETAMINOPHEN 10 MG/ML
1000 INJECTION, SOLUTION INTRAVENOUS EVERY 8 HOURS
Status: DISCONTINUED | OUTPATIENT
Start: 2023-07-08 | End: 2023-07-09

## 2023-07-08 RX ORDER — LORAZEPAM 2 MG/ML
1 INJECTION INTRAMUSCULAR
Status: COMPLETED | OUTPATIENT
Start: 2023-07-08 | End: 2023-07-08

## 2023-07-08 RX ORDER — ASPIRIN 300 MG/1
600 SUPPOSITORY RECTAL
Status: COMPLETED | OUTPATIENT
Start: 2023-07-08 | End: 2023-07-08

## 2023-07-08 RX ORDER — LORAZEPAM 2 MG/ML
INJECTION INTRAMUSCULAR
Status: DISPENSED
Start: 2023-07-08 | End: 2023-07-09

## 2023-07-08 RX ORDER — HEPARIN SODIUM,PORCINE/D5W 25000/250
0-40 INTRAVENOUS SOLUTION INTRAVENOUS CONTINUOUS
Status: DISCONTINUED | OUTPATIENT
Start: 2023-07-08 | End: 2023-07-11

## 2023-07-08 RX ORDER — ACETAMINOPHEN 325 MG/1
650 TABLET ORAL EVERY 4 HOURS PRN
Status: DISCONTINUED | OUTPATIENT
Start: 2023-07-08 | End: 2023-07-09

## 2023-07-08 RX ORDER — FOLIC ACID 1 MG/1
1 TABLET ORAL DAILY
Status: DISCONTINUED | OUTPATIENT
Start: 2023-07-09 | End: 2023-07-14 | Stop reason: HOSPADM

## 2023-07-08 RX ORDER — ATORVASTATIN CALCIUM 40 MG/1
80 TABLET, FILM COATED ORAL ONCE
Status: COMPLETED | OUTPATIENT
Start: 2023-07-09 | End: 2023-07-09

## 2023-07-08 RX ORDER — HALOPERIDOL 5 MG/ML
2.5 INJECTION INTRAMUSCULAR
Status: COMPLETED | OUTPATIENT
Start: 2023-07-08 | End: 2023-07-08

## 2023-07-08 RX ORDER — DIAZEPAM 10 MG/2ML
5 INJECTION INTRAMUSCULAR EVERY 4 HOURS PRN
Status: DISCONTINUED | OUTPATIENT
Start: 2023-07-08 | End: 2023-07-09

## 2023-07-08 RX ORDER — AMOXICILLIN 250 MG
1 CAPSULE ORAL 2 TIMES DAILY
Status: DISCONTINUED | OUTPATIENT
Start: 2023-07-08 | End: 2023-07-14 | Stop reason: HOSPADM

## 2023-07-08 RX ADMIN — DIPHENHYDRAMINE HYDROCHLORIDE 25 MG: 50 INJECTION, SOLUTION INTRAMUSCULAR; INTRAVENOUS at 01:07

## 2023-07-08 RX ADMIN — CLOPIDOGREL BISULFATE 600 MG: 300 TABLET, FILM COATED ORAL at 11:07

## 2023-07-08 RX ADMIN — CEFTRIAXONE 2 G: 2 INJECTION, POWDER, FOR SOLUTION INTRAMUSCULAR; INTRAVENOUS at 06:07

## 2023-07-08 RX ADMIN — LORAZEPAM 1 MG: 2 INJECTION INTRAMUSCULAR; INTRAVENOUS at 12:07

## 2023-07-08 RX ADMIN — DIAZEPAM 2.5 MG: 5 INJECTION, SOLUTION INTRAMUSCULAR; INTRAVENOUS at 11:07

## 2023-07-08 RX ADMIN — HEPARIN SODIUM 12 UNITS/KG/HR: 10000 INJECTION, SOLUTION INTRAVENOUS at 06:07

## 2023-07-08 RX ADMIN — MIDAZOLAM 2 MG: 1 INJECTION INTRAMUSCULAR; INTRAVENOUS at 03:07

## 2023-07-08 RX ADMIN — HALOPERIDOL LACTATE 2.5 MG: 5 INJECTION, SOLUTION INTRAMUSCULAR at 01:07

## 2023-07-08 RX ADMIN — PIPERACILLIN SODIUM AND TAZOBACTAM SODIUM 4.5 G: 4; .5 INJECTION, POWDER, LYOPHILIZED, FOR SOLUTION INTRAVENOUS at 01:07

## 2023-07-08 RX ADMIN — DEXMEDETOMIDINE HYDROCHLORIDE 0.2 MCG/KG/HR: 4 INJECTION, SOLUTION INTRAVENOUS at 08:07

## 2023-07-08 RX ADMIN — LORAZEPAM 2 MG: 2 INJECTION INTRAMUSCULAR; INTRAVENOUS at 06:07

## 2023-07-08 RX ADMIN — LEVETIRACETAM 1000 MG: 100 INJECTION, SOLUTION INTRAVENOUS at 10:07

## 2023-07-08 RX ADMIN — LORAZEPAM 2 MG: 2 INJECTION INTRAMUSCULAR; INTRAVENOUS at 02:07

## 2023-07-08 RX ADMIN — AMPICILLIN 2 G: 2 INJECTION, POWDER, FOR SOLUTION INTRAMUSCULAR; INTRAVENOUS at 10:07

## 2023-07-08 RX ADMIN — LORAZEPAM 2 MG: 2 INJECTION INTRAMUSCULAR; INTRAVENOUS at 01:07

## 2023-07-08 RX ADMIN — LORAZEPAM 2 MG: 2 INJECTION INTRAMUSCULAR; INTRAVENOUS at 12:07

## 2023-07-08 RX ADMIN — VANCOMYCIN HYDROCHLORIDE 1000 MG: 1 INJECTION, POWDER, LYOPHILIZED, FOR SOLUTION INTRAVENOUS at 04:07

## 2023-07-08 RX ADMIN — SODIUM CHLORIDE, POTASSIUM CHLORIDE, SODIUM LACTATE AND CALCIUM CHLORIDE 1000 ML: 600; 310; 30; 20 INJECTION, SOLUTION INTRAVENOUS at 02:07

## 2023-07-08 RX ADMIN — ACYCLOVIR SODIUM 400 MG: 50 INJECTION, SOLUTION INTRAVENOUS at 05:07

## 2023-07-08 RX ADMIN — DIAZEPAM 5 MG: 5 INJECTION, SOLUTION INTRAMUSCULAR; INTRAVENOUS at 11:07

## 2023-07-08 RX ADMIN — ACETAMINOPHEN 650 MG: 650 SUPPOSITORY RECTAL at 03:07

## 2023-07-08 RX ADMIN — MUPIROCIN: 20 OINTMENT TOPICAL at 10:07

## 2023-07-08 RX ADMIN — THIAMINE HYDROCHLORIDE 500 MG: 100 INJECTION, SOLUTION INTRAMUSCULAR; INTRAVENOUS at 03:07

## 2023-07-08 RX ADMIN — ASPIRIN 600 MG: 300 SUPPOSITORY RECTAL at 03:07

## 2023-07-08 RX ADMIN — LIDOCAINE HYDROCHLORIDE 1 ML: 10 INJECTION, SOLUTION INFILTRATION; PERINEURAL at 03:07

## 2023-07-08 RX ADMIN — ACETAMINOPHEN 1000 MG: 10 INJECTION INTRAVENOUS at 10:07

## 2023-07-08 RX ADMIN — ACETAMINOPHEN 650 MG: 650 SUPPOSITORY RECTAL at 12:07

## 2023-07-08 NOTE — ED PROVIDER NOTES
Encounter Date: 7/8/2023    SCRIBE #1 NOTE: I, Marycarmen Davis, am scribing for, and in the presence of,  Joseph Silva Jr., MD. I have scribed the following portions of the note - Other sections scribed: HPI, ROS.     History     Chief Complaint   Patient presents with    Seizures     EMS called to 62yo male that possibly had an unwitnessed seizure in a shed in the back of his house. Ems reports that he has a hx of seizures, unknown if he is compliant with meds, and that it was very hot in the shed. Reported that his initial temp was 101.6 axillary. Patient urinated and defecated on himself and is combative at triage.      This is a 63 year old male with PMHx of Hypertension, CAD, alcohol abuse, and Seizures on Keppra presenting to the ED via EMS for further evaluation of a seizure. Per (independent historian (EMS), they were called out by family for patient having a seizure. Family reports to EMS that patient was found in a garage with no AC. He urinated and defecated on himself during this time. Last seizure in May. EMS unaware of compliance with medications. /80, Sugar 144. Per EMS, patient is combative and confused. No other exacerbating or alleviating factors. No further complaints at present time.     The history is provided by the EMS personnel. History limited by: Acuity of condition. No  was used.   Review of patient's allergies indicates:  No Known Allergies  Past Medical History:   Diagnosis Date    Seizures      No past surgical history on file.  Family History   Problem Relation Age of Onset    Cancer Mother     Cancer Father         liver     Social History     Tobacco Use    Smoking status: Every Day     Types: Cigarettes   Substance Use Topics    Alcohol use: Yes     Comment: 3 quarts of beer daily    Drug use: Not Currently     Review of Systems   Reason unable to perform ROS: Acuity of condition.   Neurological:  Positive for seizures.   Psychiatric/Behavioral:   Positive for confusion.      Physical Exam     Initial Vitals [07/08/23 1217]   BP Pulse Resp Temp SpO2   134/80 86 18 (!) 103.1 °F (39.5 °C) 100 %      MAP       --         Physical Exam    Nursing note and vitals reviewed.  Constitutional: He appears distressed.   Patient is extremely agitated.   HENT:   Head: Normocephalic.   Right Ear: External ear normal.   Left Ear: External ear normal.   Nose: Nose normal.   Mouth/Throat: Oropharynx is clear and moist.   No signs of head trauma.   Eyes: Conjunctivae and EOM are normal. Pupils are equal, round, and reactive to light. Right eye exhibits no discharge. Left eye exhibits no discharge. No scleral icterus.   Neck: Neck supple. No JVD present.   Normal range of motion.  Cardiovascular:  Normal rate, regular rhythm, normal heart sounds and intact distal pulses.     Exam reveals no gallop and no friction rub.       No murmur heard.  Pulmonary/Chest: Breath sounds normal. No stridor. No respiratory distress. He has no wheezes. He has no rhonchi. He has no rales. He exhibits no tenderness.   Abdominal: Abdomen is soft. Bowel sounds are normal. He exhibits no distension and no mass. There is no abdominal tenderness. There is no rebound and no guarding.   Musculoskeletal:         General: No tenderness or edema. Normal range of motion.      Cervical back: Normal range of motion and neck supple.     Neurological:   Patient is not verbalizing or following any commands.  He is intermittently extremely physically agitated/aggressive.  Face is symmetric.  Pupils are equal and reactive bilaterally.   Skin: Skin is warm and dry. No rash noted. No erythema. No pallor.   No sign of wounds or skin breakdown.       ED Course   Lumbar Puncture    Date/Time: 7/8/2023 6:13 PM  Location procedure was performed: [facility] EMERGENCY DEPARTMENT  Performed by: Joseph Silva Jr., MD  Authorized by: Joseph Silva Jr., MD   Pre-operative diagnosis:  Altered mental status,  fever  Consent  Done: Emergent Situation  Indications: evaluation for infection and evaluation for altered mental status    Anesthesia:  Local Anesthetic: lidocaine 1% without epinephrine  Anesthetic total: 3 mL    Patient sedated: no  Preparation: Patient was prepped and draped in the usual sterile fashion.  Lumbar space: L3-L4 interspace  Patient's position: left lateral decubitus  Needle gauge: 20  Needle length: 3.5 in  Number of attempts: 3  Fluid appearance: clear  Tubes of fluid: 4  Total volume: 5 ml  Post-procedure: site cleaned and adhesive bandage applied  Complications: No  Estimated blood loss (mL): 0  Specimens: No  Implants: No  Patient tolerance: Patient tolerated the procedure well with no immediate complications      Critical Care    Date/Time: 7/8/2023 6:15 PM  Performed by: Joseph Silva Jr., MD  Authorized by: Joseph Silva Jr., MD   Direct patient critical care time: 40 minutes  Additional history critical care time: 10 minutes  Ordering / reviewing critical care time: 15 minutes  Documentation critical care time: 10 minutes  Consulting other physicians critical care time: 10 minutes  Consult with family critical care time: 10 minutes  Total critical care time (exclusive of procedural time) : 95 minutes  Critical care was necessary to treat or prevent imminent or life-threatening deterioration of the following conditions: cardiac failure, circulatory failure, CNS failure or compromise and sepsis.  Critical care was time spent personally by me on the following activities: development of treatment plan with patient or surrogate, discussions with consultants, interpretation of cardiac output measurements, examination of patient, evaluation of patient's response to treatment, review of old charts, re-evaluation of patient's condition, pulse oximetry, ordering and review of radiographic studies, ordering and review of laboratory studies, ordering and performing treatments and interventions and obtaining  history from patient or surrogate.      Labs Reviewed   CBC W/ AUTO DIFFERENTIAL - Abnormal; Notable for the following components:       Result Value    RBC 3.87 (*)     Hemoglobin 11.8 (*)     Hematocrit 33.5 (*)     Platelets 108 (*)     Lymph # 0.9 (*)     Gran % 75.1 (*)     Lymph % 14.6 (*)     All other components within normal limits   COMPREHENSIVE METABOLIC PANEL - Abnormal; Notable for the following components:    CO2 21 (*)     Total Bilirubin 1.1 (*)      (*)     ALT 65 (*)     All other components within normal limits   URINALYSIS, REFLEX TO URINE CULTURE - Abnormal; Notable for the following components:    Occult Blood UA 1+ (*)     All other components within normal limits    Narrative:     Specimen Source->Urine   PROCALCITONIN - Abnormal; Notable for the following components:    Procalcitonin 1.67 (*)     All other components within normal limits   TROPONIN I - Abnormal; Notable for the following components:    Troponin I 1.718 (*)     All other components within normal limits   DRUG SCREEN PANEL, URINE EMERGENCY - Abnormal; Notable for the following components:    Cocaine (Metab.) Presumptive Positive (*)     THC Presumptive Positive (*)     All other components within normal limits    Narrative:     Specimen Source->Urine   SALICYLATE LEVEL - Abnormal; Notable for the following components:    Salicylate Lvl <5.0 (*)     All other components within normal limits   TSH - Abnormal; Notable for the following components:    TSH 0.341 (*)     All other components within normal limits   CK - Abnormal; Notable for the following components:     (*)     All other components within normal limits   PROTIME-INR - Abnormal; Notable for the following components:    Prothrombin Time 13.1 (*)     INR 1.3 (*)     All other components within normal limits    Narrative:     Draw baseline aPTT prior to starting the heparin bolus or  infusion  (if patient is on warfarin prior to heparin therapy)    TROPONIN I - Abnormal; Notable for the following components:    Troponin I 4.734 (*)     All other components within normal limits   LACTIC ACID, PLASMA - Abnormal; Notable for the following components:    Lactate (Lactic Acid) 3.9 (*)     All other components within normal limits    Narrative:     LACTIC ACID   critical result(s) called and verbal readback obtained   from ALIYAH MORALES. by LM1 07/08/2023 17:40   GLUCOSE, CSF - Abnormal; Notable for the following components:    Glucose, CSF 82 (*)     All other components within normal limits   PROTEIN, CSF - Abnormal; Notable for the following components:    Protein, CSF 42 (*)     All other components within normal limits   CSF CELL COUNT WITH DIFFERENTIAL - Abnormal; Notable for the following components:    RBC, CSF 80 (*)     All other components within normal limits   ISTAT PROCEDURE - Abnormal; Notable for the following components:    POC PO2 18 (*)     POC SATURATED O2 28 (*)     POC Lactate 3.52 (*)     All other components within normal limits   ISTAT PROCEDURE - Abnormal; Notable for the following components:    POC Anion Gap 20 (*)     All other components within normal limits   CULTURE, CSF  (INCLUDES STAIN)    Narrative:     On which sequentially labeled tube should this analysis be  performed?->2   CULTURE, BLOOD   CULTURE, BLOOD   CLOSTRIDIUM DIFFICILE   LIPASE   PROTIME-INR   URINALYSIS MICROSCOPIC    Narrative:     Specimen Source->Urine   CK   ALCOHOL,MEDICAL (ETHANOL)   TSH   SALICYLATE LEVEL   ALCOHOL,MEDICAL (ETHANOL)   APTT    Narrative:     Draw baseline aPTT prior to starting the heparin bolus or  infusion  (if patient is on warfarin prior to heparin therapy)   T4, FREE   ENTEROVIRUS RNA BY PCR    Narrative:     Specimen Tube Number->Tube 3   VARICELLA ZOSTER BY RAPID PCR    Narrative:     Specimen Tube Number->Tube 3   FREEZE AND HOLD -    HERPES SIMPLEX (HSV) BY RAPID PCR, CSF   ISTAT CHEM8     EKG Readings: (Independently Interpreted)    Initial Reading: No STEMI. Ectopy: No Ectopy.   EKG reviewed and interpreted by me shows sinus rhythm at a rate of 84.  The AL and QRS intervals are normal.  The QTC is prolonged at 460 milliseconds.  There is normal axis.  There is normal R-wave progression across the precordium.  There are hyperacute T-waves in the anteroseptal leads.  No evidence of reciprocal changes.      Repeat EKG reviewed and interpreted by me shows sinus rhythm at a rate of 84 beats per minute.  The AL, QRS intervals are within normal limits.  The QTC is slightly prolonged at 460 milliseconds.  There is a normal axis.  There is normal R-wave progression.  There are peaked T-waves in the precordial and lateral leads.  No evidence of ST segment or T-wave ischemic findings.    Repeat EKG reveals no evolving ischemic changes.  There continues to be sinus rhythm at a rate of 88 beats per minute.  The QTC is prolonged at 486 milliseconds.  There is a normal axis.  There is normal R-wave progression.  There are pronounced T-waves in the lateral and anterior leads.  No reciprocal changes.    Repeat EKG reveals sinus rhythm at a rate of 86 beats per minute.  The AL, QRS intervals are normal.  The QTC is prolonged at 500 milliseconds.  There is a normal axis.  There is normal R-wave progression across the precordium.  There continued to be hyperacute T-waves in the anteroseptal leads.  No reciprocal changes.           Imaging Results              CT Head Without Contrast (Final result)  Result time 07/08/23 14:59:26      Final result by Azeem Iqbal MD (07/08/23 14:59:26)                   Impression:      No acute intracranial process.  Additional evaluation, as clinically warranted.    Changes of chronic vessel ischemic disease and cerebral volume loss.    Motion limited examination.      Electronically signed by: Azeem Iqbal MD  Date:    07/08/2023  Time:    14:59               Narrative:    EXAMINATION:  CT HEAD WITHOUT CONTRAST    CLINICAL  HISTORY:  Mental status change, unknown cause;    TECHNIQUE:  Low dose axial images were obtained through the head.  Coronal and sagittal reformations were also performed. Contrast was not administered.    COMPARISON:  CT head dated 05/12/2023.    MRI of the brain dated 01/23/2023.    FINDINGS:  The subcutaneous tissues are unremarkable.  The bony calvarium is intact.  There is a chronic fracture involving the left orbital floor.  The paranasal sinuses are unremarkable.  The mastoid air cells are clear.  The orbits and intraorbital contents are within normal limits.    The craniocervical junction is intact.  The midline structures are unremarkable.  The ventricles and sulci are prominent, consistent cerebral volume loss.  There are extensive hypodensities within the periventricular and subcortical white matter.  The gray-white differentiation is maintained.  There is no dense vessel sign.  There is no evidence of mass effect.                                       X-Ray Chest AP Portable (Final result)  Result time 07/08/23 14:38:37      Final result by Mile Sanchez MD (07/08/23 14:38:37)                   Impression:      As above.      Electronically signed by: Mile Sanchez MD  Date:    07/08/2023  Time:    14:38               Narrative:    EXAMINATION:  XR CHEST AP PORTABLE    CLINICAL HISTORY:  Sepsis;    TECHNIQUE:  Single frontal view of the chest was performed.    COMPARISON:  01/22/2023    FINDINGS:  Central pulmonary vascular congestion with mild interstitial edema.  No consolidation or pleural effusions.  Heart size is normal.  Skeletal structures are intact.                                    X-Rays:   Independently Interpreted Readings:   Other Readings:  Chest x-ray without evidence of infiltrate, consolidation, pleural effusion, pulmonary edema, or pneumothorax.  CT of the head reveals no evidence of intracranial hemorrhage or mass effect.  Medications   piperacillin-tazobactam (ZOSYN) 4.5 g in  dextrose 5 % in water (D5W) 5 % 100 mL IVPB (MB+) (4.5 g Intravenous Not Given 7/8/23 1316)   vancomycin (VANCOCIN) 1,000 mg in dextrose 5 % (D5W) 250 mL IVPB (Vial-Mate) (1,000 mg Intravenous New Bag 7/8/23 1650)   heparin 25,000 units in dextrose 5% 250 mL (100 units/mL) infusion LOW INTENSITY nomogram - [facility] (12 Units/kg/hr × 64.6 kg (Adjusted) Intravenous New Bag 7/8/23 1804)   heparin 25,000 units in dextrose 5% (100 units/ml) IV bolus from bag - ADDITIONAL PRN BOLUS - 60 units/kg (max bolus 4000 units) (has no administration in time range)   heparin 25,000 units in dextrose 5% (100 units/ml) IV bolus from bag - ADDITIONAL PRN BOLUS - 30 units/kg (max bolus 4000 units) (has no administration in time range)   cefTRIAXone (ROCEPHIN) 2 g in dextrose 5 % in water (D5W) 5 % 100 mL IVPB (MB+) (2 g Intravenous New Bag 7/8/23 1814)   LORazepam injection 2 mg ( Intravenous Not Given 7/8/23 1215)   acetaminophen suppository 650 mg (650 mg Rectal Given 7/8/23 1252)   LORazepam injection 1 mg (1 mg Intravenous Given 7/8/23 1235)   haloperidol lactate injection 2.5 mg (2.5 mg Intravenous Given 7/8/23 1357)   LORazepam injection 2 mg (2 mg Intravenous Given 7/8/23 1357)   diphenhydrAMINE injection 25 mg (25 mg Intravenous Given 7/8/23 1357)   lactated ringers bolus 1,000 mL (0 mLs Intravenous Stopped 7/8/23 1747)   lactated ringers bolus 1,000 mL (0 mLs Intravenous Stopped 7/8/23 1745)   LORazepam injection 2 mg (2 mg Intravenous Given 7/8/23 1427)   piperacillin-tazobactam (ZOSYN) 4.5 g in dextrose 5 % in water (D5W) 5 % 100 mL IVPB (MB+) (0 g Intravenous Stopped 7/8/23 1400)   LIDOcaine HCL 10 mg/ml (1%) injection 1 mL (1 mL Other Given by Provider 7/8/23 1552)   thiamine (B-1) 500 mg in dextrose 5 % (D5W) 100 mL IVPB (0 mg Intravenous Stopped 7/8/23 1615)   aspirin suppository 600 mg (600 mg Rectal Given 7/8/23 4441)   heparin 25,000 units in dextrose 5% (100 units/ml) IV bolus from bag INITIAL BOLUS (max bolus 4000 units)  "(3,880 Units Intravenous Bolus from Bag 7/8/23 1805)   acetaminophen suppository 650 mg (650 mg Rectal Given 7/8/23 1551)   midazolam (VERSED) 1 mg/mL injection 2 mg (2 mg Intravenous Given 7/8/23 1552)   acyclovir (ZOVIRAX) 400 mg in dextrose 5 % (D5W) 100 mL IVPB (0 mg Intravenous Stopped 7/8/23 1808)   LORazepam injection 2 mg (2 mg Intravenous Given 7/8/23 1806)     Medical Decision Making:   History:   Old Medical Records: I decided to obtain old medical records.  Clinical Tests:   Sepsis Perfusion Assessment: "I attest a sepsis perfusion exam was performed within 6 hours of sepsis, severe sepsis, or septic shock presentation, following fluid resuscitation."  ED Management:  This is the emergent evaluation of a 63-year-old male who presents emergency department for evaluation of altered mental status after a seizure.  Patient also noted to be hypothermic.  Differential diagnosis at the time of initial evaluation included, but was not limited to:  Withdrawal syndrome, intoxication, heat related illness, sepsis, metabolic derangement, alcohol withdrawal seizure.  Patient has ice packs on him.  He has cold towels on him.  He is received rectal acetaminophen.  He continues to be mildly hypothermic.  He is received broad-spectrum antibiotic dose.  He is getting IV fluids.  Additionally, he has an abnormal EKG with hyperacute T-waves.  His troponin is 1.7.  I reviewed these findings with Dr. Butterfield.  We do not have catheterization lab capability right now and ultimately this patient will in the rectal aspirin, heparin drip, and transfer for further evaluation and workup.  However, plan on getting CT scan to ensure no intracranial hemorrhage prior to doing this.  There is no evidence of urinary tract infection.  Urine drug screen is cocaine positive.  There is no leukocytosis but there is an elevated lactate.    TSH was abnormal but free T4 was within normal limits.  There was no acute kidney injury.  There is " baseline anemia.  Troponin went from 1.7-4.  Serial EKGs did not reveal any definite evolving ischemic changes.  There were hyperacute T-waves in the anteroseptal leads.  I discussed initial EKG with Dr. Butterfield who did not feel this was a STEMI.  I reviewed the final EKG with him as well and he does not feel this is a STEMI.  He recommended Plavix with the patient is unable to take PO.  The patient has received rectal aspirin.  After CT revealed no evidence of intracranial hemorrhage, and lumbar puncture was performed, heparin bolus and drip were initiated.  The patient did have some rectal bleeding but this appeared to be related to an internal hemorrhoid which was palpated when the patient received suppository.  The area was manipulated as well due to frequent temperature measurements.  The patient continued to have a fever.  I have less of a suspicion that this is related to he exposure and more likely infection.  The patient is cocaine positive.  Ethanol is undetectable.  This may represent alcohol withdrawal or delirium tremens.  Uncertain timeframe of when patient had last alcoholic drink.  His brother did provide some collateral information but his brother does not live with him.  The patient has received antibiotics and blood cultures are pending.  Repeat lactate was still elevated.  The patient has received IV fluids.  He has required multiple doses of medications, mostly benzodiazepines to keep his agitation under control.  He has not had definite visible seizures and does make purposeful movements and can state his name when asked by the nurse.  Status epilepticus is definitely a possibility but this seems less likely.  The patient would benefit from EEG monitoring.  Additionally, in the setting of NSTEMI and elevating troponin, patient will need to be transferred to a location with a cardiac catheterization lab in case it is needed given that this services unavailable today.  I discussed the case with  Dr. Trevor Perez from [facility] who has accepted the patient in transfer.  I have updated the patient's brother.      I, Joseph Silva MD, personally performed the services described in this documentation.  All medical record entries made by the scribe were at my direction and in my presence.  I have reviewed the chart and agree that the record reflects my personal performance and is accurate and complete.      Scribe Attestation:   Scribe #1: I performed the above scribed service and the documentation accurately describes the services I performed. I attest to the accuracy of the note.                   Clinical Impression:   Final diagnoses:  [R56.9] Seizure  [R41.82] Altered mental status, unspecified altered mental status type (Primary)  [A41.9] Sepsis, due to unspecified organism, unspecified whether acute organ dysfunction present  [F14.90] Cocaine use  [R07.9] Chest pain        ED Disposition Condition    Transfer to Another Facility Stable                Joseph Silva Jr., MD  07/08/23 1820       Joseph Silva Jr., MD  07/08/23 1828

## 2023-07-08 NOTE — ED NOTES
Clothing bag given to brother Ricardo Jiménez. Brother removed patient's wallet and belt. Brother then threw remaining clothes away due to excessive feces on clothes. Metal chain on neck removed from patient and also given to brother. PT with no other belongings upon arrival, in room, or in hospital possession.

## 2023-07-08 NOTE — ED NOTES
Sandra here for transport. Report called to Grace at [facility]. All questions answered. Rocephin, Vancomycin, and Heparin infusing without issue via iv pump. No bleeding noted. PT remains responsive to stimulation and pain only. Not following commands and  non verbal, this is consistent with his mental status since ED arrival. At time of leaving ED patient calm, lying flat.

## 2023-07-08 NOTE — ED NOTES
PT with frequent, foul smelling diarrhea. Continually cleaned by staff with clean diaper and pads applied. Dr. Silva notified.

## 2023-07-08 NOTE — ED NOTES
Bright red blood noted to rectum while cleaning diarrhea. Hemorrhoid palpable. Dr. Silva notified.

## 2023-07-08 NOTE — ED TRIAGE NOTES
Pt presents to ED from home via EMS for possible unwitnessed seizure. Per EMS, pt was found in the shed outdoors, postictal. EMS states pt has a history of seizures and it is unknown if pt is compliant with medications. Pt's axillary temp was 101.6 per EMS. Pt is combative - sitting in urine and feces.

## 2023-07-09 PROBLEM — G93.40 ENCEPHALOPATHY ACUTE: Status: ACTIVE | Noted: 2023-07-09

## 2023-07-09 PROBLEM — A41.9 SEPSIS: Status: ACTIVE | Noted: 2023-07-09

## 2023-07-09 LAB
ALBUMIN SERPL BCP-MCNC: 3.1 G/DL (ref 3.5–5.2)
ALLENS TEST: ABNORMAL
ALP SERPL-CCNC: 101 U/L (ref 55–135)
ALT SERPL W/O P-5'-P-CCNC: 55 U/L (ref 10–44)
ANION GAP SERPL CALC-SCNC: 10 MMOL/L (ref 8–16)
APTT PPP: 39.4 SEC (ref 21–32)
APTT PPP: 49.5 SEC (ref 21–32)
APTT PPP: 79.1 SEC (ref 21–32)
APTT PPP: 79.1 SEC (ref 21–32)
ASCENDING AORTA: 2.68 CM
AST SERPL-CCNC: 140 U/L (ref 10–40)
AV INDEX (PROSTH): 0.86
AV MEAN GRADIENT: 2 MMHG
AV PEAK GRADIENT: 4 MMHG
AV VALVE AREA: 2.65 CM2
AV VELOCITY RATIO: 0.87
BACTERIA #/AREA URNS AUTO: ABNORMAL /HPF
BASOPHILS # BLD AUTO: 0.01 K/UL (ref 0–0.2)
BASOPHILS # BLD AUTO: 0.02 K/UL (ref 0–0.2)
BASOPHILS # BLD AUTO: 0.02 K/UL (ref 0–0.2)
BASOPHILS NFR BLD: 0.2 % (ref 0–1.9)
BASOPHILS NFR BLD: 0.3 % (ref 0–1.9)
BASOPHILS NFR BLD: 0.3 % (ref 0–1.9)
BILIRUB SERPL-MCNC: 1 MG/DL (ref 0.1–1)
BILIRUB UR QL STRIP: NEGATIVE
BSA FOR ECHO PROCEDURE: 1.69 M2
BUN SERPL-MCNC: 8 MG/DL (ref 8–23)
C DIFF GDH STL QL: NEGATIVE
C DIFF TOX A+B STL QL IA: NEGATIVE
CALCIUM SERPL-MCNC: 8.7 MG/DL (ref 8.7–10.5)
CHLORIDE SERPL-SCNC: 103 MMOL/L (ref 95–110)
CLARITY UR REFRACT.AUTO: CLEAR
CO2 SERPL-SCNC: 21 MMOL/L (ref 23–29)
COLOR UR AUTO: ABNORMAL
CREAT SERPL-MCNC: 0.9 MG/DL (ref 0.5–1.4)
CV ECHO LV RWT: 0.41 CM
DELSYS: ABNORMAL
DELSYS: ABNORMAL
DIFFERENTIAL METHOD: ABNORMAL
DOP CALC AO PEAK VEL: 1.02 M/S
DOP CALC AO VTI: 20.37 CM
DOP CALC LVOT AREA: 3.1 CM2
DOP CALC LVOT DIAMETER: 1.98 CM
DOP CALC LVOT PEAK VEL: 0.89 M/S
DOP CALC LVOT STROKE VOLUME: 53.95 CM3
DOP CALCLVOT PEAK VEL VTI: 17.53 CM
E WAVE DECELERATION TIME: 105.54 MSEC
E/A RATIO: 1.97
E/E' RATIO: 8.56 M/S
ECHO LV POSTERIOR WALL: 0.97 CM (ref 0.6–1.1)
EJECTION FRACTION: 40 %
EOSINOPHIL # BLD AUTO: 0 K/UL (ref 0–0.5)
EOSINOPHIL NFR BLD: 0 % (ref 0–8)
ERYTHROCYTE [DISTWIDTH] IN BLOOD BY AUTOMATED COUNT: 14.2 % (ref 11.5–14.5)
ERYTHROCYTE [DISTWIDTH] IN BLOOD BY AUTOMATED COUNT: 14.2 % (ref 11.5–14.5)
ERYTHROCYTE [DISTWIDTH] IN BLOOD BY AUTOMATED COUNT: 14.4 % (ref 11.5–14.5)
ERYTHROCYTE [SEDIMENTATION RATE] IN BLOOD BY WESTERGREN METHOD: 20 MM/H
EST. GFR  (NO RACE VARIABLE): >60 ML/MIN/1.73 M^2
FLOW: 2
FRACTIONAL SHORTENING: 32 % (ref 28–44)
GLUCOSE SERPL-MCNC: 109 MG/DL (ref 70–110)
GLUCOSE UR QL STRIP: NEGATIVE
HCO3 UR-SCNC: 24.9 MMOL/L (ref 24–28)
HCO3 UR-SCNC: 25.9 MMOL/L (ref 24–28)
HCO3 UR-SCNC: 26.2 MMOL/L (ref 24–28)
HCT VFR BLD AUTO: 31.8 % (ref 40–54)
HCT VFR BLD AUTO: 33.3 % (ref 40–54)
HCT VFR BLD AUTO: 33.3 % (ref 40–54)
HGB BLD-MCNC: 10.9 G/DL (ref 14–18)
HGB BLD-MCNC: 11 G/DL (ref 14–18)
HGB BLD-MCNC: 11 G/DL (ref 14–18)
HGB UR QL STRIP: ABNORMAL
IMM GRANULOCYTES # BLD AUTO: 0.02 K/UL (ref 0–0.04)
IMM GRANULOCYTES # BLD AUTO: 0.03 K/UL (ref 0–0.04)
IMM GRANULOCYTES # BLD AUTO: 0.03 K/UL (ref 0–0.04)
IMM GRANULOCYTES NFR BLD AUTO: 0.3 % (ref 0–0.5)
IMM GRANULOCYTES NFR BLD AUTO: 0.4 % (ref 0–0.5)
IMM GRANULOCYTES NFR BLD AUTO: 0.4 % (ref 0–0.5)
INR PPP: 1.4 (ref 0.8–1.2)
INR PPP: 1.4 (ref 0.8–1.2)
INTERVENTRICULAR SEPTUM: 0.85 CM (ref 0.6–1.1)
IVRT: 104.66 MSEC
KETONES UR QL STRIP: ABNORMAL
LA MAJOR: 5.2 CM
LA MINOR: 5 CM
LA WIDTH: 4.13 CM
LACTATE SERPL-SCNC: 2.2 MMOL/L (ref 0.5–2.2)
LACTATE SERPL-SCNC: 2.6 MMOL/L (ref 0.5–2.2)
LACTATE SERPL-SCNC: 2.8 MMOL/L (ref 0.5–2.2)
LEFT ATRIUM SIZE: 4.19 CM
LEFT ATRIUM VOLUME INDEX MOD: 37.5 ML/M2
LEFT ATRIUM VOLUME INDEX: 43.9 ML/M2
LEFT ATRIUM VOLUME MOD: 64.18 CM3
LEFT ATRIUM VOLUME: 74.99 CM3
LEFT INTERNAL DIMENSION IN SYSTOLE: 3.2 CM (ref 2.1–4)
LEFT VENTRICLE DIASTOLIC VOLUME INDEX: 59.42 ML/M2
LEFT VENTRICLE DIASTOLIC VOLUME: 101.61 ML
LEFT VENTRICLE MASS INDEX: 84 G/M2
LEFT VENTRICLE SYSTOLIC VOLUME INDEX: 23.9 ML/M2
LEFT VENTRICLE SYSTOLIC VOLUME: 40.82 ML
LEFT VENTRICULAR INTERNAL DIMENSION IN DIASTOLE: 4.69 CM (ref 3.5–6)
LEFT VENTRICULAR MASS: 144.31 G
LEUKOCYTE ESTERASE UR QL STRIP: NEGATIVE
LV LATERAL E/E' RATIO: 7.7 M/S
LV SEPTAL E/E' RATIO: 9.63 M/S
LYMPHOCYTES # BLD AUTO: 1.5 K/UL (ref 1–4.8)
LYMPHOCYTES # BLD AUTO: 2.1 K/UL (ref 1–4.8)
LYMPHOCYTES # BLD AUTO: 2.1 K/UL (ref 1–4.8)
LYMPHOCYTES NFR BLD: 24.2 % (ref 18–48)
LYMPHOCYTES NFR BLD: 29.8 % (ref 18–48)
LYMPHOCYTES NFR BLD: 29.8 % (ref 18–48)
MAGNESIUM SERPL-MCNC: 2.1 MG/DL (ref 1.6–2.6)
MCH RBC QN AUTO: 30.6 PG (ref 27–31)
MCH RBC QN AUTO: 30.6 PG (ref 27–31)
MCH RBC QN AUTO: 30.9 PG (ref 27–31)
MCHC RBC AUTO-ENTMCNC: 33 G/DL (ref 32–36)
MCHC RBC AUTO-ENTMCNC: 33 G/DL (ref 32–36)
MCHC RBC AUTO-ENTMCNC: 34.3 G/DL (ref 32–36)
MCV RBC AUTO: 90 FL (ref 82–98)
MCV RBC AUTO: 93 FL (ref 82–98)
MCV RBC AUTO: 93 FL (ref 82–98)
MICROSCOPIC COMMENT: ABNORMAL
MODE: ABNORMAL
MODE: ABNORMAL
MONOCYTES # BLD AUTO: 0.7 K/UL (ref 0.3–1)
MONOCYTES # BLD AUTO: 0.8 K/UL (ref 0.3–1)
MONOCYTES # BLD AUTO: 0.8 K/UL (ref 0.3–1)
MONOCYTES NFR BLD: 10.7 % (ref 4–15)
MONOCYTES NFR BLD: 10.8 % (ref 4–15)
MONOCYTES NFR BLD: 10.8 % (ref 4–15)
MV PEAK A VEL: 0.39 M/S
MV PEAK E VEL: 0.77 M/S
MV STENOSIS PRESSURE HALF TIME: 30.61 MS
MV VALVE AREA P 1/2 METHOD: 7.19 CM2
NEUTROPHILS # BLD AUTO: 4 K/UL (ref 1.8–7.7)
NEUTROPHILS # BLD AUTO: 4.1 K/UL (ref 1.8–7.7)
NEUTROPHILS # BLD AUTO: 4.1 K/UL (ref 1.8–7.7)
NEUTROPHILS NFR BLD: 58.7 % (ref 38–73)
NEUTROPHILS NFR BLD: 58.7 % (ref 38–73)
NEUTROPHILS NFR BLD: 64.6 % (ref 38–73)
NITRITE UR QL STRIP: NEGATIVE
NRBC BLD-RTO: 0 /100 WBC
PCO2 BLDA: 36.5 MMHG (ref 35–45)
PCO2 BLDA: 44.6 MMHG (ref 35–45)
PCO2 BLDA: 46 MMHG (ref 35–45)
PH SMN: 7.36 [PH] (ref 7.35–7.45)
PH SMN: 7.36 [PH] (ref 7.35–7.45)
PH SMN: 7.46 [PH] (ref 7.35–7.45)
PH UR STRIP: 6 [PH] (ref 5–8)
PHOSPHATE SERPL-MCNC: 3.5 MG/DL (ref 2.7–4.5)
PISA TR MAX VEL: 3.3 M/S
PLATELET # BLD AUTO: 108 K/UL (ref 150–450)
PLATELET # BLD AUTO: 108 K/UL (ref 150–450)
PLATELET # BLD AUTO: 96 K/UL (ref 150–450)
PMV BLD AUTO: 10.7 FL (ref 9.2–12.9)
PMV BLD AUTO: 10.7 FL (ref 9.2–12.9)
PMV BLD AUTO: 11 FL (ref 9.2–12.9)
PO2 BLDA: 21 MMHG (ref 40–60)
PO2 BLDA: 26 MMHG (ref 40–60)
PO2 BLDA: 97 MMHG (ref 80–100)
POC BE: -1 MMOL/L
POC BE: 1 MMOL/L
POC BE: 2 MMOL/L
POC SATURATED O2: 33 % (ref 95–100)
POC SATURATED O2: 45 % (ref 95–100)
POC SATURATED O2: 98 % (ref 95–100)
POC TCO2: 26 MMOL/L (ref 24–29)
POC TCO2: 27 MMOL/L (ref 23–27)
POC TCO2: 28 MMOL/L (ref 24–29)
POTASSIUM SERPL-SCNC: 3.4 MMOL/L (ref 3.5–5.1)
PROT SERPL-MCNC: 6.8 G/DL (ref 6–8.4)
PROT UR QL STRIP: NEGATIVE
PROTHROMBIN TIME: 14.8 SEC (ref 9–12.5)
PROTHROMBIN TIME: 14.8 SEC (ref 9–12.5)
RA MAJOR: 4.48 CM
RA PRESSURE: 3 MMHG
RA WIDTH: 3.56 CM
RBC # BLD AUTO: 3.53 M/UL (ref 4.6–6.2)
RBC # BLD AUTO: 3.6 M/UL (ref 4.6–6.2)
RBC # BLD AUTO: 3.6 M/UL (ref 4.6–6.2)
RBC #/AREA URNS AUTO: >100 /HPF (ref 0–4)
RIGHT VENTRICULAR END-DIASTOLIC DIMENSION: 2.87 CM
SAMPLE: ABNORMAL
SINUS: 3 CM
SITE: ABNORMAL
SODIUM SERPL-SCNC: 134 MMOL/L (ref 136–145)
SP GR UR STRIP: 1.01 (ref 1–1.03)
SP02: 100
STJ: 2.37 CM
TDI LATERAL: 0.1 M/S
TDI SEPTAL: 0.08 M/S
TDI: 0.09 M/S
TR MAX PG: 44 MMHG
TRICUSPID ANNULAR PLANE SYSTOLIC EXCURSION: 2.1 CM
TROPONIN I SERPL DL<=0.01 NG/ML-MCNC: 5.04 NG/ML (ref 0–0.03)
TROPONIN I SERPL DL<=0.01 NG/ML-MCNC: 5.38 NG/ML (ref 0–0.03)
TROPONIN I SERPL DL<=0.01 NG/ML-MCNC: 5.92 NG/ML (ref 0–0.03)
TV REST PULMONARY ARTERY PRESSURE: 47 MMHG
URN SPEC COLLECT METH UR: ABNORMAL
WBC # BLD AUTO: 6.25 K/UL (ref 3.9–12.7)
WBC # BLD AUTO: 6.94 K/UL (ref 3.9–12.7)
WBC # BLD AUTO: 6.94 K/UL (ref 3.9–12.7)
WBC #/AREA URNS AUTO: 7 /HPF (ref 0–5)

## 2023-07-09 PROCEDURE — 99900035 HC TECH TIME PER 15 MIN (STAT)

## 2023-07-09 PROCEDURE — 99223 PR INITIAL HOSPITAL CARE,LEVL III: ICD-10-PCS | Mod: ,,, | Performed by: INTERNAL MEDICINE

## 2023-07-09 PROCEDURE — 83735 ASSAY OF MAGNESIUM: CPT

## 2023-07-09 PROCEDURE — 25500020 PHARM REV CODE 255: Performed by: PSYCHIATRY & NEUROLOGY

## 2023-07-09 PROCEDURE — 25000003 PHARM REV CODE 250

## 2023-07-09 PROCEDURE — 85730 THROMBOPLASTIN TIME PARTIAL: CPT | Mod: 91

## 2023-07-09 PROCEDURE — 93010 ELECTROCARDIOGRAM REPORT: CPT | Mod: ,,, | Performed by: INTERNAL MEDICINE

## 2023-07-09 PROCEDURE — 87529 HSV DNA AMP PROBE: CPT | Mod: 59

## 2023-07-09 PROCEDURE — 85730 THROMBOPLASTIN TIME PARTIAL: CPT

## 2023-07-09 PROCEDURE — 84100 ASSAY OF PHOSPHORUS: CPT

## 2023-07-09 PROCEDURE — 85730 THROMBOPLASTIN TIME PARTIAL: CPT | Mod: 91 | Performed by: PSYCHIATRY & NEUROLOGY

## 2023-07-09 PROCEDURE — 27000221 HC OXYGEN, UP TO 24 HOURS

## 2023-07-09 PROCEDURE — 83605 ASSAY OF LACTIC ACID: CPT | Mod: 91 | Performed by: NURSE PRACTITIONER

## 2023-07-09 PROCEDURE — 85610 PROTHROMBIN TIME: CPT

## 2023-07-09 PROCEDURE — 84484 ASSAY OF TROPONIN QUANT: CPT | Mod: 91 | Performed by: NURSE PRACTITIONER

## 2023-07-09 PROCEDURE — 99223 1ST HOSP IP/OBS HIGH 75: CPT | Mod: ,,, | Performed by: INTERNAL MEDICINE

## 2023-07-09 PROCEDURE — 63600175 PHARM REV CODE 636 W HCPCS: Performed by: PSYCHIATRY & NEUROLOGY

## 2023-07-09 PROCEDURE — 82803 BLOOD GASES ANY COMBINATION: CPT

## 2023-07-09 PROCEDURE — 84484 ASSAY OF TROPONIN QUANT: CPT

## 2023-07-09 PROCEDURE — 83605 ASSAY OF LACTIC ACID: CPT | Mod: 91

## 2023-07-09 PROCEDURE — 94761 N-INVAS EAR/PLS OXIMETRY MLT: CPT

## 2023-07-09 PROCEDURE — 93005 ELECTROCARDIOGRAM TRACING: CPT

## 2023-07-09 PROCEDURE — 80053 COMPREHEN METABOLIC PANEL: CPT

## 2023-07-09 PROCEDURE — 93010 EKG 12-LEAD: ICD-10-PCS | Mod: ,,, | Performed by: INTERNAL MEDICINE

## 2023-07-09 PROCEDURE — 86255 FLUORESCENT ANTIBODY SCREEN: CPT | Mod: 59

## 2023-07-09 PROCEDURE — 36600 WITHDRAWAL OF ARTERIAL BLOOD: CPT

## 2023-07-09 PROCEDURE — 25000003 PHARM REV CODE 250: Performed by: PSYCHIATRY & NEUROLOGY

## 2023-07-09 PROCEDURE — 20000000 HC ICU ROOM

## 2023-07-09 PROCEDURE — 99291 CRITICAL CARE FIRST HOUR: CPT | Mod: ,,, | Performed by: PSYCHIATRY & NEUROLOGY

## 2023-07-09 PROCEDURE — 85025 COMPLETE CBC W/AUTO DIFF WBC: CPT

## 2023-07-09 PROCEDURE — 87040 BLOOD CULTURE FOR BACTERIA: CPT | Mod: 59 | Performed by: NURSE PRACTITIONER

## 2023-07-09 PROCEDURE — 99291 PR CRITICAL CARE, E/M 30-74 MINUTES: ICD-10-PCS | Mod: ,,, | Performed by: PSYCHIATRY & NEUROLOGY

## 2023-07-09 PROCEDURE — 63600175 PHARM REV CODE 636 W HCPCS

## 2023-07-09 RX ORDER — PHENOBARBITAL 20 MG/5ML
8 ELIXIR ORAL 2 TIMES DAILY
Status: DISCONTINUED | OUTPATIENT
Start: 2023-07-14 | End: 2023-07-12

## 2023-07-09 RX ORDER — SODIUM,POTASSIUM PHOSPHATES 280-250MG
2 POWDER IN PACKET (EA) ORAL
Status: DISCONTINUED | OUTPATIENT
Start: 2023-07-09 | End: 2023-07-12

## 2023-07-09 RX ORDER — PHENOBARBITAL 15 MG/1
15 TABLET ORAL 2 TIMES DAILY
Status: DISCONTINUED | OUTPATIENT
Start: 2023-07-12 | End: 2023-07-12

## 2023-07-09 RX ORDER — LANOLIN ALCOHOL/MO/W.PET/CERES
800 CREAM (GRAM) TOPICAL
Status: DISCONTINUED | OUTPATIENT
Start: 2023-07-09 | End: 2023-07-12

## 2023-07-09 RX ORDER — LEVETIRACETAM 500 MG/5ML
1500 INJECTION, SOLUTION, CONCENTRATE INTRAVENOUS ONCE
Status: COMPLETED | OUTPATIENT
Start: 2023-07-09 | End: 2023-07-09

## 2023-07-09 RX ORDER — PHENOBARBITAL 20 MG/5ML
4 ELIXIR ORAL 2 TIMES DAILY
Status: DISCONTINUED | OUTPATIENT
Start: 2023-07-15 | End: 2023-07-12

## 2023-07-09 RX ORDER — PHENOBARBITAL SODIUM 65 MG/ML
15.7 INJECTION, SOLUTION INTRAMUSCULAR; INTRAVENOUS DAILY
Status: DISCONTINUED | OUTPATIENT
Start: 2023-07-12 | End: 2023-07-09

## 2023-07-09 RX ORDER — PHENOBARBITAL SODIUM 65 MG/ML
3.9 INJECTION, SOLUTION INTRAMUSCULAR; INTRAVENOUS DAILY
Status: DISCONTINUED | OUTPATIENT
Start: 2023-07-15 | End: 2023-07-09

## 2023-07-09 RX ORDER — ACETAMINOPHEN 325 MG/1
650 TABLET ORAL EVERY 4 HOURS PRN
Status: DISCONTINUED | OUTPATIENT
Start: 2023-07-09 | End: 2023-07-12

## 2023-07-09 RX ORDER — PHENOBARBITAL 15 MG/1
30 TABLET ORAL 2 TIMES DAILY
Status: DISCONTINUED | OUTPATIENT
Start: 2023-07-10 | End: 2023-07-12

## 2023-07-09 RX ORDER — PHENOBARBITAL SODIUM 65 MG/ML
7.9 INJECTION, SOLUTION INTRAMUSCULAR; INTRAVENOUS DAILY
Status: DISCONTINUED | OUTPATIENT
Start: 2023-07-14 | End: 2023-07-09

## 2023-07-09 RX ORDER — CLOPIDOGREL BISULFATE 75 MG/1
75 TABLET ORAL DAILY
Status: DISCONTINUED | OUTPATIENT
Start: 2023-07-09 | End: 2023-07-12

## 2023-07-09 RX ORDER — ACETAMINOPHEN 500 MG
1000 TABLET ORAL EVERY 8 HOURS PRN
Status: DISCONTINUED | OUTPATIENT
Start: 2023-07-09 | End: 2023-07-12

## 2023-07-09 RX ORDER — NAPROXEN SODIUM 220 MG/1
81 TABLET, FILM COATED ORAL DAILY
Status: DISCONTINUED | OUTPATIENT
Start: 2023-07-09 | End: 2023-07-12

## 2023-07-09 RX ORDER — PHENOBARBITAL SODIUM 65 MG/ML
31.4 INJECTION, SOLUTION INTRAMUSCULAR; INTRAVENOUS DAILY
Status: DISCONTINUED | OUTPATIENT
Start: 2023-07-10 | End: 2023-07-09

## 2023-07-09 RX ADMIN — AMPICILLIN 2 G: 2 INJECTION, POWDER, FOR SOLUTION INTRAMUSCULAR; INTRAVENOUS at 09:07

## 2023-07-09 RX ADMIN — CEFEPIME 2 G: 2 INJECTION, POWDER, FOR SOLUTION INTRAVENOUS at 07:07

## 2023-07-09 RX ADMIN — SENNOSIDES AND DOCUSATE SODIUM 1 TABLET: 50; 8.6 TABLET ORAL at 08:07

## 2023-07-09 RX ADMIN — AMPICILLIN 2 G: 2 INJECTION, POWDER, FOR SOLUTION INTRAMUSCULAR; INTRAVENOUS at 06:07

## 2023-07-09 RX ADMIN — AMPICILLIN 2 G: 2 INJECTION, POWDER, FOR SOLUTION INTRAMUSCULAR; INTRAVENOUS at 02:07

## 2023-07-09 RX ADMIN — DIAZEPAM 5 MG: 5 INJECTION, SOLUTION INTRAMUSCULAR; INTRAVENOUS at 11:07

## 2023-07-09 RX ADMIN — HEPARIN SODIUM 9 UNITS/KG/HR: 10000 INJECTION, SOLUTION INTRAVENOUS at 02:07

## 2023-07-09 RX ADMIN — PHENOBARBITAL SODIUM 163.8 MG: 130 INJECTION INTRAMUSCULAR; INTRAVENOUS at 06:07

## 2023-07-09 RX ADMIN — ACYCLOVIR SODIUM 680 MG: 1000 INJECTION, SOLUTION INTRAVENOUS at 08:07

## 2023-07-09 RX ADMIN — CEFEPIME 2 G: 2 INJECTION, POWDER, FOR SOLUTION INTRAVENOUS at 03:07

## 2023-07-09 RX ADMIN — CEFEPIME 2 G: 2 INJECTION, POWDER, FOR SOLUTION INTRAVENOUS at 11:07

## 2023-07-09 RX ADMIN — CLOPIDOGREL BISULFATE 75 MG: 75 TABLET ORAL at 08:07

## 2023-07-09 RX ADMIN — MUPIROCIN: 20 OINTMENT TOPICAL at 08:07

## 2023-07-09 RX ADMIN — VANCOMYCIN HYDROCHLORIDE 750 MG: 750 INJECTION, POWDER, LYOPHILIZED, FOR SOLUTION INTRAVENOUS at 09:07

## 2023-07-09 RX ADMIN — FOLIC ACID 1 MG: 1 TABLET ORAL at 08:07

## 2023-07-09 RX ADMIN — LEVETIRACETAM 1000 MG: 100 INJECTION, SOLUTION INTRAVENOUS at 08:07

## 2023-07-09 RX ADMIN — AMPICILLIN 2 G: 2 INJECTION, POWDER, FOR SOLUTION INTRAMUSCULAR; INTRAVENOUS at 05:07

## 2023-07-09 RX ADMIN — LEVETIRACETAM 1500 MG: 100 INJECTION, SOLUTION INTRAVENOUS at 02:07

## 2023-07-09 RX ADMIN — VANCOMYCIN HYDROCHLORIDE 750 MG: 750 INJECTION, POWDER, LYOPHILIZED, FOR SOLUTION INTRAVENOUS at 11:07

## 2023-07-09 RX ADMIN — PHENOBARBITAL SODIUM 163.8 MG: 130 INJECTION INTRAMUSCULAR; INTRAVENOUS at 03:07

## 2023-07-09 RX ADMIN — DIAZEPAM 5 MG: 5 INJECTION, SOLUTION INTRAMUSCULAR; INTRAVENOUS at 07:07

## 2023-07-09 RX ADMIN — CEFEPIME 2 G: 2 INJECTION, POWDER, FOR SOLUTION INTRAVENOUS at 12:07

## 2023-07-09 RX ADMIN — HUMAN ALBUMIN MICROSPHERES AND PERFLUTREN 0.11 MG: 10; .22 INJECTION, SOLUTION INTRAVENOUS at 11:07

## 2023-07-09 RX ADMIN — PHENOBARBITAL SODIUM 218.4 MG: 130 INJECTION INTRAMUSCULAR; INTRAVENOUS at 12:07

## 2023-07-09 RX ADMIN — POTASSIUM BICARBONATE 35 MEQ: 391 TABLET, EFFERVESCENT ORAL at 10:07

## 2023-07-09 RX ADMIN — POLYETHYLENE GLYCOL 3350 17 G: 17 POWDER, FOR SOLUTION ORAL at 09:07

## 2023-07-09 RX ADMIN — Medication 100 MG: at 08:07

## 2023-07-09 RX ADMIN — ATORVASTATIN CALCIUM 80 MG: 40 TABLET, FILM COATED ORAL at 02:07

## 2023-07-09 RX ADMIN — ACYCLOVIR SODIUM 680 MG: 1000 INJECTION, SOLUTION INTRAVENOUS at 12:07

## 2023-07-09 RX ADMIN — ASPIRIN 81 MG 81 MG: 81 TABLET ORAL at 08:07

## 2023-07-09 RX ADMIN — ACYCLOVIR SODIUM 600 MG: 1000 INJECTION, SOLUTION INTRAVENOUS at 03:07

## 2023-07-09 RX ADMIN — POTASSIUM BICARBONATE 35 MEQ: 391 TABLET, EFFERVESCENT ORAL at 12:07

## 2023-07-09 NOTE — PLAN OF CARE
[facility] Care Plan    POC reviewed with Paty Jiménez at 0300. Pt unable to verbalize understanding. Interventional Cardiology consulted for STEMI. Will continue to monitor. See below and flowsheets for full assessment and VS info.   -Aox1, intermittently able to answer name otherwise incomprehensible sounds  -pt likely expressively aphasic/dysarthric   -EEG cap on   -EKG showing lateral STEMI, plavix PO given via NG   -hep gtt @ 7 units/hr   -mauro removed, voiding per condom cath   -NG tube placed, ok to use   -Q6 lactic acid and troponin trending   -restraints ordered for agitation          Is this a stroke patient? no    Neuro:  [place] Coma Scale  Best Eye Response: 2-->(E2) to pain  Best Motor Response: 5-->(M5) localizes pain  Best Verbal Response: 2-->(V2) incomprehensible speech  [place] Coma Scale Score: 9  Assessment Qualifiers: patient not sedated/intubated  Pupil PERRLA: yes     24hr Temp:  [97.5 °F (36.4 °C)-103.3 °F (39.6 °C)]     CV:   Rhythm: sinus arrhythmia  BP goals:   SBP < 160  MAP > 65    Resp:           Plan: N/A    GI/:     Diet/Nutrition Received: NPO  Last Bowel Movement: 07/08/23  Voiding Characteristics: due to void    Intake/Output Summary (Last 24 hours) at 7/9/2023 0512  Last data filed at 7/9/2023 0301  Gross per 24 hour   Intake 2633.66 ml   Output 1140 ml   Net 1493.66 ml          Labs/Accuchecks:  Recent Labs   Lab 07/09/23  0006   WBC 6.25   RBC 3.53*   HGB 10.9*   HCT 31.8*   PLT 96*      Recent Labs   Lab 07/09/23  0009   *   K 3.4*   CO2 21*      BUN 8   CREATININE 0.9   ALKPHOS 101   ALT 55*   *   BILITOT 1.0      Recent Labs   Lab 07/09/23  0006 07/09/23  0406   INR 1.4*  1.4*  --    APTT 79.1*  79.1* 49.5*      Recent Labs   Lab 07/08/23  2143   *   TROPONINI 5.874*       Electrolytes: Electrolytes replaced  Accuchecks: none    Gtts:   heparin (porcine) in D5W 9 Units/kg/hr (07/09/23 0216)       LDA/Wounds:  Lines/Drains/Airways       Drain   Duration                  NG/OG Tube 07/09/23 0000 14 Fr. Right nostril <1 day    Male External Urinary Catheter 07/08/23 2250 <1 day              Peripheral Intravenous Line  Duration                  Peripheral IV - Single Lumen 07/08/23 1550 20 G Anterior;Proximal;Right Forearm <1 day         Peripheral IV - Single Lumen 07/08/23 1659 18 G Anterior;Right Upper Arm <1 day         Peripheral IV - Single Lumen 07/08/23 1800 20 G Anterior;Distal;Left Upper Arm <1 day                  Wounds: No  Wound care consulted: No

## 2023-07-09 NOTE — PROCEDURES
EEG REPORT      Paty Jiménez  [id number]  1960    DATE OF SERVICE: 7/8/2023     23-    METHODOLOGY      Extended electroencephalographic recording is made while the patient is ambulatory and continuing normal daily activities.  Electrodes are placed according to the International 10-20 placement system and included T1 and T2 electrode placement.  Twenty four (24) channels of digital signal (sampling rate of 512/sec) was simultaneously recorded from the scalp including EKG and eye monitors.  Recording band pass was 0.1 to 100 hz and all data was stored digitally on the recorder.  The patient is instructed to press an event button when clinical symptoms occur and write the symptoms into a diary. Activation procedures which include photic stimulation, hyperventilation and instructing patients to perform simple task are done in selected patients.        The EEG is displayed on a monitor screen and can be reformatted into different montages for evaluation.  The entire recoding is submitted for computer assisted analysis to detect spike and electrographic seizure activity.  The entire recording is visually reviewed and the times identified by computer analysis as being spikes or seizures are reviewed again.  Compresses spectral analysis (CSA) is also performed on the activity recorded from each individual channel.  This is displayed as a power display of frequencies from 0 to 30 Hz over time.   The CSA analysis is done and displayed continuously.  This is reviewed for asymmetries in power between homologous areas of the scalp and for presence of changes in power which canbe seen when seizures occur.  Sections of suspected abnormalities on the CSA is then compared with the original EEG recording.  .     [employer] software was also utilized in the review of this study.  This software suite analyzes the EEG recording in multiple domains.  Coherence and rhythmicity is computed to identify EEG sections which may contain  organized seizures.  Each channel undergoes analysis to detect presence of spike and sharp waves which have special and morphological characteristic of epileptic activity.  The routine EEG recording is converted from spacial into frequency domain.  This is then displayed comparing homologous areas to identify areas of significant asymmetry.  Algorithm to identify non-cortically generated artifact is used to separate eye movement, EMG and other artifact from the EEG     Recording Times  Start on 7/8/2023  Stop on 7/9/2023    A total of 11:02:38 and 7:16:23 hours of EEG was recorded.      EEG FINDINGS:  Background activity:   The background rhythm was characterized by partially organized alpha activity with absent posterior dominant alpha rhythm.   Symmetry and continuity: the background was continuous and symmetric     Sleep:   Normal sleep transients including sleep spindles, K complexes, vertex waves were seen although sleep was poorly staged.    Activation procedures:   NA    Abnormal activity:   No epileptiform discharges, periodic discharges, lateralized rhythmic delta activity or electrographic seizures were seen.    IMPRESSION:   Abnormal EEG due to mild generalized non-specific cerebral dysfunction with no electrographic seizures or indications of seizure tendency.      Obie Taylor MD  Neurology-Epilepsy.  [facility].

## 2023-07-09 NOTE — CARE UPDATE
Got routine cardiology consult around 9:30 pm  Went to see the patient, he was found to have STEMI in lateral leads since 12:48 pm today, he was in an outside hospital at that time and got transferred here around 7:30 pm   Encehpalopathic jasmyne, unable to talk  Dropped NG tube to give meds  Had gotten rectal ASA prior to arrival, on hep gtt, giving plavix 600 and HI statin   Septic, fever 103.3 and on vanc and cefepime  Echo shows apical ballooning with normal contraction in bases   Trop uptrending

## 2023-07-09 NOTE — PROGRESS NOTES
Pharmacokinetic Initial Assessment: IV Vancomycin    Assessment/Plan:    Initiate intravenous vancomycin with loading dose of 1000 mg once in ED followed by a maintenance dose of vancomycin 1750mg IV every 24 hours  Desired empiric serum trough concentration is 15 to 20 mcg/mL  Draw vancomycin trough level 60 min prior to third dose on 7/10 at approximately 1530  Pharmacy will continue to follow and monitor vancomycin.      Please contact pharmacy at extension [phone] any questions regarding this assessment.     Thank you for the consult,   Phuc Pitt       Patient brief summary:  Paty Jiménez is a 63 y.o. male initiated on antimicrobial therapy with IV Vancomycin for treatment of suspected meningitis    Drug Allergies:   Review of patient's allergies indicates:  No Known Allergies    Actual Body Weight:   60.2kg    Renal Function:   Estimated Creatinine Clearance: 64.4 mL/min (based on SCr of 1 mg/dL).,     Dialysis Method (if applicable):  N/A    CBC (last 72 hours):  Recent Labs   Lab Result Units 07/08/23  1240   WBC K/uL 6.22   Hemoglobin g/dL 11.8*   Hematocrit % 33.5*   Platelets K/uL 108*   Gran % % 75.1*   Lymph % % 14.6*   Mono % % 9.6   Eosinophil % % 0.0   Basophil % % 0.5   Differential Method  Automated       Metabolic Panel (last 72 hours):  Recent Labs   Lab Result Units 07/08/23  1240 07/08/23  1307 07/08/23  1640   Sodium mmol/L 137  --   --    Potassium mmol/L 4.2  --   --    Chloride mmol/L 102  --   --    CO2 mmol/L 21*  --   --    Glucose mg/dL 108  --   --    Glucose, CSF mg/dL  --   --  82*   Glucose, UA   --  Negative  --    BUN mg/dL 8  --   --    Creatinine mg/dL 1.0  --   --    Creatinine, Urine mg/dL  --  48.8  --    Albumin g/dL 3.8  --   --    Total Bilirubin mg/dL 1.1*  --   --    Alkaline Phosphatase U/L 132  --   --    AST U/L 139*  --   --    ALT U/L 65*  --   --        Drug levels (last 3 results):  No results for input(s): VANCOMYCINRA, VANCORANDOM, VANCOMYCINPE, VANCOPEAK,  VANCOMYCINTR, VANCOTROUGH in the last 72 hours.    Microbiologic Results:  Microbiology Results (last 7 days)       Procedure Component Value Units Date/Time    Blood culture (site 1) [[id number]] Collected: 07/08/23 2125    Order Status: Sent Specimen: Blood from Peripheral, Foot, Left Updated: 07/08/23 2150    Blood culture (site 2) [[id number]] Collected: 07/08/23 2125    Order Status: Sent Specimen: Blood from Peripheral, Foot, Left Updated: 07/08/23 2150    Blood culture x two cultures. Draw prior to antibiotics. [[id number]] Collected: 07/08/23 1243    Order Status: Completed Specimen: Blood from Peripheral, Hand, Right Updated: 07/08/23 1912     Blood Culture, Routine No Growth to date    Narrative:      Aerobic and anaerobic    Blood culture x two cultures. Draw prior to antibiotics. [[id number]] Collected: 07/08/23 1241    Order Status: Completed Specimen: Blood from Peripheral, Forearm, Left Updated: 07/08/23 1912     Blood Culture, Routine No Growth to date    Narrative:      Aerobic and anaerobic    CSF culture and Gram Stain (Tube 2) [[id number]] Collected: 07/08/23 1651    Order Status: Completed Specimen: CSF (Spinal Fluid) from CSF Tap, Tube 2 Updated: 07/08/23 1741     Gram Stain Result Cytospin indicates:      No WBC's      No organisms seen    Narrative:      On which sequentially labeled tube should this analysis be  performed?->2    Clostridium difficile EIA [[id number]] Collected: 07/08/23 1639    Order Status: Sent Specimen: Stool Updated: 07/08/23 1701

## 2023-07-09 NOTE — PROGRESS NOTES
Pharmacokinetic Assessment Follow Up: IV Vancomycin    Vancomycin Regimen Assessment & Plan  - Vancomycin 1000 mg IV x1, then 750 mg IV q12h.  - Draw trough on 7/10 @0930 prior to 4th total dose, goal 15-20 mcg/mL.    Drug levels (last 3 results):  No results for input(s): VANCOMYCINRA, VANCORANDOM, VANCOMYCINPE, VANCOPEAK, VANCOMYCINTR, VANCOTROUGH in the last 72 hours.    Pharmacy will continue to follow and monitor vancomycin.    Please contact pharmacy at extension [phone] for questions regarding this assessment.    Thank you for the consult,   Andrea Patel     Patient brief summary:  Paty Jiménez is a 63 y.o. male initiated on antimicrobial therapy with IV Vancomycin for treatment of meningitis    Drug Allergies:   Review of patient's allergies indicates:  No Known Allergies    Actual Body Weight:   60.2 kg    Renal Function:   Estimated Creatinine Clearance: 71.5 mL/min (based on SCr of 0.9 mg/dL).,     Dialysis Method (if applicable):  N/A

## 2023-07-09 NOTE — H&P
[facility]  Neurocritical Care  History & Physical    Admit Date: 7/8/2023  Service Date: 07/09/2023  Length of Stay: 1    Subjective:     Chief Complaint: Seizure    History of Present Illness: Paty Jiménez is a 64yo M w PMHx of HTN, CAD, EtOH use, and seizures on keppra presenting to OSH ED via EMS for evaluation of a seizure. Pt lives in his niece's guest house. Niece found him sitting on the couch around 10am sitting in urine and feces with his eyes rolled back and mouth bleeding. Niece said that this happens once every 1-2 months and pt is brought to the hospital with workups revealing seizures. The last seizures were in 5/2023 and 4/2023. Niece's daughter checks on the pt daily to see if he takes his keppra, and pt is mostly compliant per niece. Niece said that she brought the pt water and he was able to drink it and speak close to his baseline. Pt was taken to OSH via EMS. Pt was confused and combative on arrival. Pt did not have witnessed seizures in ED. He was oriented to self and moved purposefully. Pt was found to be septic with tachycardia, increased RR, temp 103F, lactic 3.9, procal 1.7 to 4.91. He received rectal acetaminophen. Started on vanc, zosyn, acyclovir, and rocephin. Pt fluid resuscitated 2L LR. CTH was negative for acute processes. LP was performed (glucose 82, protein 42, WBC 1) and labs sent. Unclear when pt had his last drink. EtOH was undetectable c/f possible alcohol withdrawal or delirium tremens. He has required multiple doses of mostly benzodiazepines to keep his agitation under control.                 He was found to have an EKG with hyperacute T-waves in anteroseptal leads and troponin 1.7 that trended up to 4.7. Pt was given rectal aspirin, started on heparin drip, and transferred for further evaluation and workup. Utox positive for cocaine, THC. Pt had rectal bleeding from an internal hemorrhoid after several medications were given and temps were taken  rectally. OSH did not have cath lab. Pt was directly admitted to [facility] for higher level of care and cardiology evaluation. On arrival, troponin trended up to 5.8 and EKG revealed STEMI.       Past Medical History:   Diagnosis Date    Seizures      No past surgical history on file.   No current facility-administered medications on file prior to encounter.     Current Outpatient Medications on File Prior to Encounter   Medication Sig Dispense Refill    levETIRAcetam (KEPPRA) 250 MG Tab Take 2 tablets (500 mg total) by mouth 2 (two) times daily. 120 tablet 3      Allergies: Patient has no known allergies.    Family History   Problem Relation Age of Onset    Cancer Mother     Cancer Father         liver     Social History     Tobacco Use    Smoking status: Every Day     Types: Cigarettes   Substance Use Topics    Alcohol use: Yes     Comment: 3 quarts of beer daily    Drug use: Not Currently     Review of Systems   Unable to perform ROS: Mental status change   Objective:     Vitals:    Temp: 100.2 °F (37.9 °C)  Pulse: 105  BP: (!) 122/94  MAP (mmHg): 105  Resp: 19  SpO2: 100 %    Temp  Min: 100.2 °F (37.9 °C)  Max: 103.3 °F (39.6 °C)  Pulse  Min: 80  Max: 105  BP  Min: 101/56  Max: 134/80  MAP (mmHg)  Min: 74  Max: 105  Resp  Min: 17  Max: 29  SpO2  Min: 93 %  Max: 100 %    No intake/output data recorded.            Physical Exam  Vitals and nursing note reviewed.   Constitutional:       Appearance: He is normal weight. He is ill-appearing and toxic-appearing. He is not diaphoretic.   HENT:      Head: Normocephalic and atraumatic.      Right Ear: External ear normal.      Left Ear: External ear normal.      Nose: Nose normal.      Mouth/Throat:      Mouth: Mucous membranes are dry.      Pharynx: Oropharynx is clear.   Eyes:      Pupils: Pupils are equal, round, and reactive to light.   Cardiovascular:      Rate and Rhythm: Regular rhythm. Tachycardia present.      Pulses: Normal pulses.      Heart sounds: Normal  heart sounds.   Pulmonary:      Effort: Pulmonary effort is normal.      Comments: 2L NC   Bl coarse BS  Abdominal:      General: Abdomen is flat. Bowel sounds are normal.      Palpations: Abdomen is soft.   Musculoskeletal:      Cervical back: Normal range of motion and neck supple. No rigidity or tenderness.   Skin:     General: Skin is warm and dry.      Capillary Refill: Capillary refill takes less than 2 seconds.   Neurological:      Comments: E3 V3 M5  Drowsy. Ox1 (answered name correctly, place incorrectly, and did not answer for time or situation). Pt intermittently says 1-3 words with mild dysarthria. Tracks examiner.  Appeared to follow commands once in BUE but none since.    CN II-XII grossly intact, specifically:  PERRLA.   EOMI   No facial asymmetry  Blinks to threat bilaterally   Motor:   Moves all extremities spontaneously.  Strength 5/5 in BUE and BLE and WD in all extremities   Sensation intact to noxious stimuli in all extremities- grimaces and becomes agitated    Psychiatric:         Attention and Perception: He is inattentive.         Behavior: Behavior is agitated.     Unable to test orientation, language, memory, judgment, insight, fund of knowledge, hearing, shoulder shrug, tongue protrusion, coordination, gait due to level of consciousness.       Today I personally reviewed pertinent medications, lines/drains/airways, imaging, cardiology results, laboratory results, microbiology results, notably:    [facility]   EKG      Assessment/Plan:     Neuro  * Seizure  64yo M w PMHx of HTN, CAD, EtOH use, and seizures on keppra presenting to OSH ED via EMS for evaluation of a seizure after being found post-ictal on his couch. Breakthrough seizures q1-2 months recently. Lactic and CK elevated.    Pt was found to be septic. He received rectal acetaminophen. Started on vanc, zosyn, acyclovir, and rocephin. Fluid resuscitated 2L LR.   LP was performed (glucose 82, protein 42, WBC 1) and labs sent. Add on labs  ordered  Unclear when pt had his last drink. EtOH was WNL c/f possible alcohol withdrawal or delirium tremens.             OSH CTH negative for acute findings      Admit to [facility]   q1h neuro checks and vitals   CBC, CMP, mag, phos daily    TSH, A1c, lipid panel, coags, Utox, EtOH   HSV PCR   Echo, EKG, CXR   vEEG  o Appreciate epilepsy recs    Keppra loaded w 2.5g     Keppra 1000 BID    F/u LP labs  o Add on ordered: Meningitis encephalitis panel  o Add on ordered: [place] send out- CSFME   Vanc, cefepime, ampicillin, acyclovir    SCD; hold chemical VTE ppx in acute period    PT/OT/SLP   Avoid agents that reduce seizure threshold    Seizure precautions    Encephalopathy acute  See seizure     Psychiatric  Alcohol abuse  Unclear when pt had his last drink. EtOH was undetectable c/f possible alcohol withdrawal or delirium tremens. He has required multiple doses of mostly benzodiazepines to keep his agitation under control.     · CIWA protocol   · PETH, ethanol    · LFTs   · Thiamine   · Folate  · Prn valium     Cardiac/Vascular  STEMI (ST elevation myocardial infarction)  OSH EKG noted to have hyperacute T waves in anteroseptal leads and troponin trending up from 1.7 to 4.7  Received rectal ASA and started on heparin gtt   Utox positive for cocaine.   On arrival, troponin trended up to 5.8 and EKG revealed STEMI    · Continue low intensity heparin gtt   · Trending troponin   · O2   · Consulted interventional cardiology, recs:   · Not a candidate for cath lab due to sepsis and out of window   · loaded with ASA rectally prior to arrival here  · Plavix load 600 via NG tube  · Cont hep gtt  · Bedside echo w EF 20%, apical ballooning and normal wall motion at the bases concerning for [place] pattern  · Start ASA 81 qd and plavix 75 qd  · Atorvastatin 80  · Formal echo   · F/up with cardiology post discharge   · General cardiology consult for further management     Elevated troponin  See stemi    HTN  (hypertension)  SBP <160    Other  Tobacco use disorder  Nicotine patch prn         The patient is being Prophylaxed for:  Venous Thromboembolism with: Mechanical  Stress Ulcer with: Not Applicable   Ventilator Pneumonia with: not applicable    Activity Orders          Progressive Mobility Protocol (mobilize patient to their highest level of functioning at least twice daily) starting at 07/08 2000    Bed rest starting at 07/08 1225        Full Code     Critical care time spent on the evaluation and treatment of severe organ dysfunction, review of pertinent labs and imaging studies, discussions with consulting providers and discussions with patient/family: 75 minutes.      Jose E Tinoco PA-C  Neurocritical Care  [facility]

## 2023-07-09 NOTE — CONSULTS
[facility]  Interventional Cardiology  Consult Note    Patient Name: Paty Jiménez  MRN: [id number]  Admission Date: 7/8/2023  Hospital Length of Stay: 1 days  Code Status: Full Code   Attending Provider: Trevor Perez MD   Consulting Provider: Nabeel Levine MD  Primary Care Physician: To Obtain Unable  Principal Problem:Seizure    Patient information was obtained from primary team.     Inpatient consult to Interventional Cardiology  Consult performed by: Nabeel Levine MD  Consult ordered by: Jose E Tinoco PA-C        Subjective:     Chief Complaint:  Possible STEMI      HPI:   This is a 63 years old gentleman with h/o Epilepsy with question of non compliance to anti epileptic drugs, HTN, alcohol abuse who had an unwitnessed seizure in a shed in the back of his house. Family reports to EMS that patient was found in a garage with no AC and his temp was 101.6 on EMS arrival. Upon arrival in [facility] ER, his EKG showed ST elevation at 12:48 pm, per records his EKGs were reviewed by cardiology there and it wasn't deemed a STEMI.  He was transferred here around 7:30 pm today. He is encehpalopathic currenlty, unable to talk. His Trop are uptrending and now at 5. Echo shows apical ballooning with normal contraction in bases with EF about 20% concerning for Tokutsubo pattern. He is currenlty septic with tmax 103.3 and is on vanc and cefepime        Past Medical History:   Diagnosis Date    Seizures      No past surgical history on file.   No current facility-administered medications on file prior to encounter.     Current Outpatient Medications on File Prior to Encounter   Medication Sig Dispense Refill    levETIRAcetam (KEPPRA) 250 MG Tab Take 2 tablets (500 mg total) by mouth 2 (two) times daily. 120 tablet 3      Allergies: Patient has no known allergies.    Family History   Problem Relation Age of Onset    Cancer Mother     Cancer Father         liver     Social History      Tobacco Use    Smoking status: Every Day     Types: Cigarettes   Substance Use Topics    Alcohol use: Yes     Comment: 3 quarts of beer daily    Drug use: Not Currently     Review of Systems   Unable to perform ROS: Mental status change   Objective:     Vitals:    Temp: 100.2 °F (37.9 °C)  Pulse: 105  Rhythm: normal sinus rhythm  BP: (!) 122/94  MAP (mmHg): 105  Resp: 19  SpO2: 100 %    Temp  Min: 100.2 °F (37.9 °C)  Max: 103.3 °F (39.6 °C)  Pulse  Min: 80  Max: 105  BP  Min: 101/56  Max: 134/80  MAP (mmHg)  Min: 74  Max: 105  Resp  Min: 17  Max: 29  SpO2  Min: 93 %  Max: 100 %    07/08 0701 - 07/09 0700  In: 2300   Out: 1140 [Urine:1140]            Physical Exam  Vitals and nursing note reviewed.   Constitutional:       Appearance: He is normal weight. He is ill-appearing and toxic-appearing. He is not diaphoretic.   HENT:      Head: Normocephalic and atraumatic.      Right Ear: External ear normal.      Left Ear: External ear normal.      Nose: Nose normal.      Mouth/Throat:      Mouth: Mucous membranes are dry.      Pharynx: Oropharynx is clear.   Eyes:      Pupils: Pupils are equal, round, and reactive to light.   Cardiovascular:      Rate and Rhythm: Regular rhythm. Tachycardia present.      Pulses: Normal pulses.      Heart sounds: Normal heart sounds.   Pulmonary:      Effort: Pulmonary effort is normal.      Comments: 2L NC   Bl coarse BS  Abdominal:      General: Abdomen is flat. Bowel sounds are normal.      Palpations: Abdomen is soft.   Musculoskeletal:      Cervical back: Normal range of motion and neck supple. No rigidity or tenderness.   Skin:     General: Skin is warm and dry.      Capillary Refill: Capillary refill takes less than 2 seconds.   Neurological:      Comments: E3 V3 M5  Drowsy. Ox1 (answered name correctly, place incorrectly, and did not answer for time or situation). Pt intermittently says 1-3 words with mild dysarthria. Tracks examiner.  Appeared to follow commands once in BUE but  none since.    CN II-XII grossly intact, specifically:  PERRLA.   EOMI   No facial asymmetry  Blinks to threat bilaterally   Motor:   Moves all extremities spontaneously.  Strength 5/5 in BUE and BLE and WD in all extremities   Sensation intact to noxious stimuli in all extremities- grimaces and becomes agitated    Psychiatric:         Attention and Perception: He is inattentive.         Behavior: Behavior is agitated.     Unable to test orientation, language, memory, judgment, insight, fund of knowledge, hearing, shoulder shrug, tongue protrusion, coordination, gait due to level of consciousness.       Today I personally reviewed pertinent medications, lines/drains/airways, imaging, cardiology results, laboratory results, microbiology results, notably:    CTH   EKG    Review of Systems   Unable to perform ROS: Mental status change   Physical Exam  Vitals and nursing note reviewed.   Constitutional:       Appearance: He is normal weight. He is ill-appearing and toxic-appearing. He is not diaphoretic.   HENT:      Head: Normocephalic and atraumatic.      Right Ear: External ear normal.      Left Ear: External ear normal.      Nose: Nose normal.      Mouth/Throat:      Mouth: Mucous membranes are dry.      Pharynx: Oropharynx is clear.   Eyes:      Pupils: Pupils are equal, round, and reactive to light.   Cardiovascular:      Rate and Rhythm: Regular rhythm. Tachycardia present.      Pulses: Normal pulses.      Heart sounds: Normal heart sounds.   Pulmonary:      Effort: Pulmonary effort is normal.      Comments: 2L NC   Bl coarse BS  Abdominal:      General: Abdomen is flat. Bowel sounds are normal.      Palpations: Abdomen is soft.   Musculoskeletal:      Cervical back: Normal range of motion and neck supple. No rigidity or tenderness.   Skin:     General: Skin is warm and dry.      Capillary Refill: Capillary refill takes less than 2 seconds.   Neurological:   Drowsy. Ox1 (answered name correctly, place  incorrectly, and did not answer for time or situation).       Assessment and Plan:     Acute MI with concern for STEMI  Presented after an unwitnessed seizure today  Unable to obtain history due to AMS  Septic currently with Tmax 103.3, on broad spectrum IV antibiotics  EKG with hyperacute T waves and questionable ST elevations in lateral leads  Echo at bedside with EF 20%, apical ballooning and normal wall motion at the bases concerning for Tokutsubo pattern    Recommendations  -talked to interventional cardiology on call Dr Hernandez, due to above findings, will medically manage the patient  -loaded with ASA rectally prior to arrival here, Plavix load 600 via NG tube  -from tomorrow evening, start ASA 81 qd and plavix 75 qd  -hep gtt per ACS protocol   -HI statin  -check a1c and lipid panel  -formal echo   -will need f/up with cardiology post discharge   -general cardiology consult for further management       VTE Risk Mitigation (From admission, onward)           Ordered     heparin 25,000 units in dextrose 5% (100 units/ml) IV bolus from bag - ADDITIONAL PRN BOLUS - 60 units/kg (max bolus 4000 units)  As needed (PRN)        Question:  Heparin Infusion Adjustment (DO NOT MODIFY ANSWER)  Answer:  \[email or web address]\epic\Images\Pharmacy\HeparinInfusions\heparin LOW INTENSITY nomogram for [facility] [id number]    07/08/23 2343     heparin 25,000 units in dextrose 5% (100 units/ml) IV bolus from bag - ADDITIONAL PRN BOLUS - 30 units/kg (max bolus 4000 units)  As needed (PRN)        Question:  Heparin Infusion Adjustment (DO NOT MODIFY ANSWER)  Answer:  \[email or web address]\epic\Images\Pharmacy\HeparinInfusions\heparin LOW INTENSITY nomogram for [facility] [id number]    07/08/23 2343     heparin 25,000 units in dextrose 5% (100 units/ml) IV bolus from bag INITIAL BOLUS (max bolus 4000 units)  Once        Question:  Heparin Infusion Adjustment (DO NOT MODIFY ANSWER)  Answer:  \[email or web address]\epic\Images\Pharmacy\HeparinInfusions\heparin LOW INTENSITY  nomogram for [facility] [id number].pdf    07/08/23 2343     heparin 25,000 units in dextrose 5% 250 mL (100 units/mL) infusion LOW INTENSITY nomogram - [facility]  Continuous        Question Answer Comment   Heparin Infusion Adjustment (DO NOT MODIFY ANSWER) [email or web address]\epic\Images\Pharmacy\HeparinInfusions\heparin LOW INTENSITY nomogram for [facility] [id number].pdf    Begin at (in units/kg/hr) 12        07/08/23 2343     Place RADHA hose  Until discontinued         07/08/23 2007     IP VTE LOW RISK PATIENT  Once         07/08/23 2007                    Thank you for your consult.     Nabeel Levine MD  Cardiology   [facility]

## 2023-07-09 NOTE — ASSESSMENT & PLAN NOTE
[facility] EKG noted to have hyperacute T waves in anteroseptal leads and troponin trending up from 1.7 to 4.7  Received rectal ASA and started on heparin gtt   · Continue heparin gtt   · Consulted cardiology   · Trending troponin   · Trend EKGs   · O2

## 2023-07-09 NOTE — ASSESSMENT & PLAN NOTE
62yo M w PMHx of HTN, CAD, EtOH use, and seizures on keppra presenting to [facility] ED via EMS for evaluation of a seizure after being found post-ictal on his couch. Breakthrough seizures q1-2 months recently. Lactic and CK elevated.    Pt was found to be septic. He received rectal acetaminophen. Started on vanc, zosyn, acyclovir, and rocephin. Fluid resuscitated 2L LR.   LP was performed (glucose 82, protein 42, WBC 1) and labs sent. Add on labs ordered  Unclear when pt had his last drink. EtOH was WNL c/f possible alcohol withdrawal or delirium tremens.             [facility] CTH negative for acute findings      Admit to NCC   q1h neuro checks and vitals   CBC, CMP, mag, phos daily    TSH, A1c, lipid panel, coags, Utox, EtOH   HSV PCR   Echo, EKG, CXR   vEEG  o Appreciate epilepsy recs    Keppra loaded w 2.5g     Keppra 1000 BID    F/u LP labs  o Add on ordered: Meningitis encephalitis panel  o Add on ordered: [facility] send out- CSFME   Vanc, cefepime, ampicillin, acyclovir    SCD; hold chemical VTE ppx in acute period    PT/OT/SLP   Avoid agents that reduce seizure threshold    Seizure precautions

## 2023-07-09 NOTE — NURSING
Patient arrived to [facility] from [facility] >> [facility] >> Neuro ICU     Report received from: LUIS Edwards    Type of stroke/diagnosis:  status       Current symptoms: PT nonverbal, moving all extremities move spontaneously, does not follow commands, and febrile    Skin Assessment done: Yes  Wounds noted: lumbar puncture site  *If wounds noted, was Wound Care consulted?   *If wounds noted, LDA placed?   Skin Assessment Verified by:      Marina Completed? Yes    Patient Belongings on Admit: none    NCC notified: ALISON Benitez

## 2023-07-09 NOTE — HPI
This is a 63 years old gentleman with h/o Epilepsy with question of non compliance to anti epileptic drugs, HTN, alcohol abuse who had an unwitnessed seizure in a shed in the back of his house. Family reports to EMS that patient was found in a garage with no AC and his temp was 101.6 on EMS arrival. Upon arrival in [facility] ER, his EKG showed ST elevation at 12:48 pm, per records his EKGs were reviewed by cardiology there and it wasn't deemed a STEMI.  He was transferred here around 7:30 pm today. He is encehpalopathic currenlty, unable to talk. His Trop are uptrending and now at 5. Echo shows apical ballooning with normal contraction in bases with EF about 20% concerning for Tokutsubo pattern. He is currenlty septic with tmax 103.3 and is on vanc and cefepime

## 2023-07-09 NOTE — SUBJECTIVE & OBJECTIVE
Past Medical History:   Diagnosis Date    Seizures      No past surgical history on file.   No current facility-administered medications on file prior to encounter.     Current Outpatient Medications on File Prior to Encounter   Medication Sig Dispense Refill    levETIRAcetam (KEPPRA) 250 MG Tab Take 2 tablets (500 mg total) by mouth 2 (two) times daily. 120 tablet 3      Allergies: Patient has no known allergies.    Family History   Problem Relation Age of Onset    Cancer Mother     Cancer Father         liver     Social History     Tobacco Use    Smoking status: Every Day     Types: Cigarettes   Substance Use Topics    Alcohol use: Yes     Comment: 3 quarts of beer daily    Drug use: Not Currently     Review of Systems   Unable to perform ROS: Mental status change   Objective:     Vitals:    Temp: 100.2 °F (37.9 °C)  Pulse: 105  Rhythm: normal sinus rhythm  BP: (!) 122/94  MAP (mmHg): 105  Resp: 19  SpO2: 100 %    Temp  Min: 100.2 °F (37.9 °C)  Max: 103.3 °F (39.6 °C)  Pulse  Min: 80  Max: 105  BP  Min: 101/56  Max: 134/80  MAP (mmHg)  Min: 74  Max: 105  Resp  Min: 17  Max: 29  SpO2  Min: 93 %  Max: 100 %    07/08 0701 - 07/09 0700  In: 2300   Out: 1140 [Urine:1140]            Physical Exam  Vitals and nursing note reviewed.   Constitutional:       Appearance: He is normal weight. He is ill-appearing and toxic-appearing. He is not diaphoretic.   HENT:      Head: Normocephalic and atraumatic.      Right Ear: External ear normal.      Left Ear: External ear normal.      Nose: Nose normal.      Mouth/Throat:      Mouth: Mucous membranes are dry.      Pharynx: Oropharynx is clear.   Eyes:      Pupils: Pupils are equal, round, and reactive to light.   Cardiovascular:      Rate and Rhythm: Regular rhythm. Tachycardia present.      Pulses: Normal pulses.      Heart sounds: Normal heart sounds.   Pulmonary:      Effort: Pulmonary effort is normal.      Comments: 2L NC   Bl coarse BS  Abdominal:      General: Abdomen is  flat. Bowel sounds are normal.      Palpations: Abdomen is soft.   Musculoskeletal:      Cervical back: Normal range of motion and neck supple. No rigidity or tenderness.   Skin:     General: Skin is warm and dry.      Capillary Refill: Capillary refill takes less than 2 seconds.   Neurological:      Comments: E3 V3 M5  Drowsy. Ox1 (answered name correctly, place incorrectly, and did not answer for time or situation). Pt intermittently says 1-3 words with mild dysarthria. Tracks examiner.  Appeared to follow commands once in BUE but none since.    CN II-XII grossly intact, specifically:  PERRLA.   EOMI   No facial asymmetry  Blinks to threat bilaterally   Motor:   Moves all extremities spontaneously.  Strength 5/5 in BUE and BLE and WD in all extremities   Sensation intact to noxious stimuli in all extremities- grimaces and becomes agitated    Psychiatric:         Attention and Perception: He is inattentive.         Behavior: Behavior is agitated.     Unable to test orientation, language, memory, judgment, insight, fund of knowledge, hearing, shoulder shrug, tongue protrusion, coordination, gait due to level of consciousness.       Today I personally reviewed pertinent medications, lines/drains/airways, imaging, cardiology results, laboratory results, microbiology results, notably:    CTH   EKG    Review of Systems   Unable to perform ROS: Mental status change   Physical Exam  Vitals and nursing note reviewed.   Constitutional:       Appearance: He is normal weight. He is ill-appearing and toxic-appearing. He is not diaphoretic.   HENT:      Head: Normocephalic and atraumatic.      Right Ear: External ear normal.      Left Ear: External ear normal.      Nose: Nose normal.      Mouth/Throat:      Mouth: Mucous membranes are dry.      Pharynx: Oropharynx is clear.   Eyes:      Pupils: Pupils are equal, round, and reactive to light.   Cardiovascular:      Rate and Rhythm: Regular rhythm. Tachycardia present.       Pulses: Normal pulses.      Heart sounds: Normal heart sounds.   Pulmonary:      Effort: Pulmonary effort is normal.      Comments: 2L NC   Bl coarse BS  Abdominal:      General: Abdomen is flat. Bowel sounds are normal.      Palpations: Abdomen is soft.   Musculoskeletal:      Cervical back: Normal range of motion and neck supple. No rigidity or tenderness.   Skin:     General: Skin is warm and dry.      Capillary Refill: Capillary refill takes less than 2 seconds.   Neurological:      Comments: E3 V3 M5  Drowsy. Ox1 (answered name correctly, place incorrectly, and did not answer for time or situation). Pt intermittently says 1-3 words with mild dysarthria. Tracks examiner.  Appeared to follow commands once in BUE but none since.    CN II-XII grossly intact, specifically:  PERRLA.   EOMI   No facial asymmetry  Blinks to threat bilaterally   Motor:   Moves all extremities spontaneously.  Strength 5/5 in BUE and BLE and WD in all extremities   Sensation intact to noxious stimuli in all extremities- grimaces and becomes agitated    Psychiatric:         Attention and Perception: He is inattentive.         Behavior: Behavior is agitated.

## 2023-07-09 NOTE — SUBJECTIVE & OBJECTIVE
Past Medical History:   Diagnosis Date    Seizures      No past surgical history on file.   No current facility-administered medications on file prior to encounter.     Current Outpatient Medications on File Prior to Encounter   Medication Sig Dispense Refill    levETIRAcetam (KEPPRA) 250 MG Tab Take 2 tablets (500 mg total) by mouth 2 (two) times daily. 120 tablet 3      Allergies: Patient has no known allergies.    Family History   Problem Relation Age of Onset    Cancer Mother     Cancer Father         liver     Social History     Tobacco Use    Smoking status: Every Day     Types: Cigarettes   Substance Use Topics    Alcohol use: Yes     Comment: 3 quarts of beer daily    Drug use: Not Currently     Review of Systems   Unable to perform ROS: Mental status change   Objective:     Vitals:    Temp: 100.2 °F (37.9 °C)  Pulse: 105  BP: (!) 122/94  MAP (mmHg): 105  Resp: 19  SpO2: 100 %    Temp  Min: 100.2 °F (37.9 °C)  Max: 103.3 °F (39.6 °C)  Pulse  Min: 80  Max: 105  BP  Min: 101/56  Max: 134/80  MAP (mmHg)  Min: 74  Max: 105  Resp  Min: 17  Max: 29  SpO2  Min: 93 %  Max: 100 %    No intake/output data recorded.            Physical Exam  Vitals and nursing note reviewed.   Constitutional:       Appearance: He is normal weight. He is ill-appearing and toxic-appearing. He is not diaphoretic.   HENT:      Head: Normocephalic and atraumatic.      Right Ear: External ear normal.      Left Ear: External ear normal.      Nose: Nose normal.      Mouth/Throat:      Mouth: Mucous membranes are dry.      Pharynx: Oropharynx is clear.   Eyes:      Pupils: Pupils are equal, round, and reactive to light.   Cardiovascular:      Rate and Rhythm: Regular rhythm. Tachycardia present.      Pulses: Normal pulses.      Heart sounds: Normal heart sounds.   Pulmonary:      Effort: Pulmonary effort is normal.      Comments: 2L NC   Bl coarse BS  Abdominal:      General: Abdomen is flat. Bowel sounds are normal.      Palpations: Abdomen is  soft.   Musculoskeletal:      Cervical back: Normal range of motion and neck supple. No rigidity or tenderness.   Skin:     General: Skin is warm and dry.      Capillary Refill: Capillary refill takes less than 2 seconds.   Neurological:      Comments: E3 V3 M5  Drowsy. Ox1 (answered name correctly, place incorrectly, and did not answer for time or situation). Pt intermittently says 1-3 words with mild dysarthria. Tracks examiner.  Appeared to follow commands once in BUE but none since.    CN II-XII grossly intact, specifically:  PERRLA.   EOMI   No facial asymmetry  Blinks to threat bilaterally   Motor:   Moves all extremities spontaneously.  Strength 5/5 in BUE and BLE and WD in all extremities   Sensation intact to noxious stimuli in all extremities- grimaces and becomes agitated    Psychiatric:         Attention and Perception: He is inattentive.         Behavior: Behavior is agitated.     Unable to test orientation, language, memory, judgment, insight, fund of knowledge, hearing, shoulder shrug, tongue protrusion, coordination, gait due to level of consciousness.       Today I personally reviewed pertinent medications, lines/drains/airways, imaging, cardiology results, laboratory results, microbiology results, notably:    CTH   EKG

## 2023-07-09 NOTE — ASSESSMENT & PLAN NOTE
[facility] EKG noted to have hyperacute T waves in anteroseptal leads and troponin trending up from 1.7 to 4.7  Received rectal ASA and started on heparin gtt   Utox positive for cocaine.   On arrival, troponin trended up to 5.8 and EKG revealed STEMI    · Continue low intensity heparin gtt   · Trending troponin   · O2   · Consulted interventional cardiology, recs:   · Not a candidate for cath lab due to sepsis and out of window   · loaded with ASA rectally prior to arrival here  · Plavix load 600 via NG tube  · Cont hep gtt  · Bedside echo w EF 20%, apical ballooning and normal wall motion at the bases concerning for Tokutsubo pattern  · Start ASA 81 qd and plavix 75 qd  · Atorvastatin 80  · Formal echo   · F/up with cardiology post discharge   · General cardiology consult for further management

## 2023-07-09 NOTE — CONSULTS
[facility]  Cardiology  Consult Note    Patient Name: Paty Jiménez  MRN: [id number]  Admission Date: 7/8/2023  Hospital Length of Stay: 1 days  Code Status: Full Code   Attending Provider: Trevor Perez MD   Consulting Provider: Nabeel Levine MD  Primary Care Physician: To Obtain Unable  Principal Problem:Seizure    Patient information was obtained from primary team.     Inpatient consult to Cardiology  Consult performed by: Nabeel Levine MD  Consult ordered by: Jose E Tinoco PA-C        Subjective:     Chief Complaint:  Possible STEMI      HPI:   This is a 63 years old gentleman with h/o Epilepsy with question of non compliance to anti epileptic drugs, HTN, alcohol abuse who had an unwitnessed seizure in a shed in the back of his house. Family reports to EMS that patient was found in a garage with no AC and his temp was 101.6 on EMS arrival. Upon arrival in [facility] ER, his EKG showed ST elevation at 12:48 pm, per records his EKGs were reviewed by cardiology there and it wasn't deemed a STEMI.  He was transferred here around 7:30 pm today. He is encehpalopathic currenlty, unable to talk. His Trop are uptrending and now at 5. Echo shows apical ballooning with normal contraction in bases with EF about 20% concerning for Tokutsubo pattern. He is currenlty septic with tmax 103.3 and is on vanc and cefepime        Past Medical History:   Diagnosis Date    Seizures      No past surgical history on file.   No current facility-administered medications on file prior to encounter.     Current Outpatient Medications on File Prior to Encounter   Medication Sig Dispense Refill    levETIRAcetam (KEPPRA) 250 MG Tab Take 2 tablets (500 mg total) by mouth 2 (two) times daily. 120 tablet 3      Allergies: Patient has no known allergies.    Family History   Problem Relation Age of Onset    Cancer Mother     Cancer Father         liver     Social History     Tobacco Use    Smoking status:  Every Day     Types: Cigarettes   Substance Use Topics    Alcohol use: Yes     Comment: 3 quarts of beer daily    Drug use: Not Currently     Review of Systems   Unable to perform ROS: Mental status change   Objective:     Vitals:    Temp: 100.2 °F (37.9 °C)  Pulse: 105  Rhythm: normal sinus rhythm  BP: (!) 122/94  MAP (mmHg): 105  Resp: 19  SpO2: 100 %    Temp  Min: 100.2 °F (37.9 °C)  Max: 103.3 °F (39.6 °C)  Pulse  Min: 80  Max: 105  BP  Min: 101/56  Max: 134/80  MAP (mmHg)  Min: 74  Max: 105  Resp  Min: 17  Max: 29  SpO2  Min: 93 %  Max: 100 %    07/08 0701 - 07/09 0700  In: 2300   Out: 1140 [Urine:1140]            Physical Exam  Vitals and nursing note reviewed.   Constitutional:       Appearance: He is normal weight. He is ill-appearing and toxic-appearing. He is not diaphoretic.   HENT:      Head: Normocephalic and atraumatic.      Right Ear: External ear normal.      Left Ear: External ear normal.      Nose: Nose normal.      Mouth/Throat:      Mouth: Mucous membranes are dry.      Pharynx: Oropharynx is clear.   Eyes:      Pupils: Pupils are equal, round, and reactive to light.   Cardiovascular:      Rate and Rhythm: Regular rhythm. Tachycardia present.      Pulses: Normal pulses.      Heart sounds: Normal heart sounds.   Pulmonary:      Effort: Pulmonary effort is normal.      Comments: 2L NC   Bl coarse BS  Abdominal:      General: Abdomen is flat. Bowel sounds are normal.      Palpations: Abdomen is soft.   Musculoskeletal:      Cervical back: Normal range of motion and neck supple. No rigidity or tenderness.   Skin:     General: Skin is warm and dry.      Capillary Refill: Capillary refill takes less than 2 seconds.   Neurological:      Comments: E3 V3 M5  Drowsy. Ox1 (answered name correctly, place incorrectly, and did not answer for time or situation). Pt intermittently says 1-3 words with mild dysarthria. Tracks examiner.  Appeared to follow commands once in BUE but none since.    CN II-XII grossly  intact, specifically:  PERRLA.   EOMI   No facial asymmetry  Blinks to threat bilaterally   Motor:   Moves all extremities spontaneously.  Strength 5/5 in BUE and BLE and WD in all extremities   Sensation intact to noxious stimuli in all extremities- grimaces and becomes agitated    Psychiatric:         Attention and Perception: He is inattentive.         Behavior: Behavior is agitated.     Unable to test orientation, language, memory, judgment, insight, fund of knowledge, hearing, shoulder shrug, tongue protrusion, coordination, gait due to level of consciousness.       Today I personally reviewed pertinent medications, lines/drains/airways, imaging, cardiology results, laboratory results, microbiology results, notably:    CTH   EKG    Review of Systems   Unable to perform ROS: Mental status change   Physical Exam  Vitals and nursing note reviewed.   Constitutional:       Appearance: He is normal weight. He is ill-appearing and toxic-appearing. He is not diaphoretic.   HENT:      Head: Normocephalic and atraumatic.      Right Ear: External ear normal.      Left Ear: External ear normal.      Nose: Nose normal.      Mouth/Throat:      Mouth: Mucous membranes are dry.      Pharynx: Oropharynx is clear.   Eyes:      Pupils: Pupils are equal, round, and reactive to light.   Cardiovascular:      Rate and Rhythm: Regular rhythm. Tachycardia present.      Pulses: Normal pulses.      Heart sounds: Normal heart sounds.   Pulmonary:      Effort: Pulmonary effort is normal.      Comments: 2L NC   Bl coarse BS  Abdominal:      General: Abdomen is flat. Bowel sounds are normal.      Palpations: Abdomen is soft.   Musculoskeletal:      Cervical back: Normal range of motion and neck supple. No rigidity or tenderness.   Skin:     General: Skin is warm and dry.      Capillary Refill: Capillary refill takes less than 2 seconds.   Neurological:   Drowsy. Ox1 (answered name correctly, place incorrectly, and did not answer for time or  situation).       Assessment and Plan:     Acute MI with concern for STEMI  Presented after an unwitnessed seizure today  Unable to obtain history due to AMS  Septic currently with Tmax 103.3, on broad spectrum IV antibiotics  EKG with hyperacute T waves and questionable ST elevations in lateral leads  Echo at bedside with EF 20%, apical ballooning and normal wall motion at the bases concerning for Tokutsubo pattern    Recommendations  -talked to interventional cardiology on call Dr Hernandez, due to above findings, will medically manage the patient  -loaded with ASA rectally prior to arrival here, Plavix load 600 via NG tube  -from tomorrow evening, start ASA 81 qd and plavix 75 qd  -hep gtt per ACS protocol   -HI statin  -check a1c and lipid panel  -formal echo   -will need f/up with cardiology post discharge       VTE Risk Mitigation (From admission, onward)         Ordered     heparin 25,000 units in dextrose 5% (100 units/ml) IV bolus from bag - ADDITIONAL PRN BOLUS - 60 units/kg (max bolus 4000 units)  As needed (PRN)        Question:  Heparin Infusion Adjustment (DO NOT MODIFY ANSWER)  Answer:  \[email or web address]\[email or web address]\Images\Pharmacy\HeparinInfusions\heparin LOW INTENSITY nomogram for [facility] [id number].pdf    07/08/23 2343     heparin 25,000 units in dextrose 5% (100 units/ml) IV bolus from bag - ADDITIONAL PRN BOLUS - 30 units/kg (max bolus 4000 units)  As needed (PRN)        Question:  Heparin Infusion Adjustment (DO NOT MODIFY ANSWER)  Answer:  \[email or web address]\epic\Images\Pharmacy\HeparinInfusions\heparin LOW INTENSITY nomogram for [facility] [id number].pdf    07/08/23 2343     heparin 25,000 units in dextrose 5% (100 units/ml) IV bolus from bag INITIAL BOLUS (max bolus 4000 units)  Once        Question:  Heparin Infusion Adjustment (DO NOT MODIFY ANSWER)  Answer:  \[email or web address]\epic\Images\Pharmacy\HeparinInfusions\heparin LOW INTENSITY nomogram for [facility] [id number].pdf    07/08/23 2343     heparin 25,000 units in dextrose 5% 250 mL (100  units/mL) infusion LOW INTENSITY nomogram - [facility]  Continuous        Question Answer Comment   Heparin Infusion Adjustment (DO NOT MODIFY ANSWER) [email or web address]\epic\Images\Pharmacy\HeparinInfusions\heparin LOW INTENSITY nomogram for [facility] [id number].pdf    Begin at (in units/kg/hr) 12        07/08/23 2343     Place RADHA hose  Until discontinued         07/08/23 2007     IP VTE LOW RISK PATIENT  Once         07/08/23 2007                Thank you for your consult. I will follow-up with patient. Please contact us if you have any additional questions.    Nabeel Levine MD  Cardiology   [facility]

## 2023-07-09 NOTE — HPI
Paty Jiménez is a 64yo M w PMHx of HTN, CAD, EtOH use, and seizures on keppra presenting to OSH ED via EMS for evaluation of a seizure. Pt lives in his niece's guest house. Niece found him sitting on the couch around 10am sitting in urine and feces with his eyes rolled back and mouth bleeding. Niece said that this happens once every 1-2 months and pt is brought to the hospital with workups revealing seizures. The last seizures were in 5/2023 and 4/2023. Niece's daughter checks on the pt daily to see if he takes his keppra, and pt is mostly compliant per niece. Niece said that she brought the pt water and he was able to drink it and speak close to his baseline. Pt was taken to OSH via EMS. Pt was confused and combative on arrival. Pt did not have witnessed seizures in ED. He was oriented to self and moved purposefully. Pt was found to be septic with tachycardia, increased RR, temp 103F, lactic 3.9, procal 1.7 to 4.91. He received rectal acetaminophen. Started on vanc, zosyn, acyclovir, and rocephin. Pt fluid resuscitated 2L LR. CTH was negative for acute processes. LP was performed (glucose 82, protein 42, WBC 1) and labs sent. Unclear when pt had his last drink. EtOH was undetectable c/f possible alcohol withdrawal or delirium tremens. He has required multiple doses of mostly benzodiazepines to keep his agitation under control.                 He was found to have an EKG with hyperacute T-waves in anteroseptal leads and troponin 1.7 that trended up to 4.7. Pt was given rectal aspirin, started on heparin drip, and transferred for further evaluation and workup. Utox positive for cocaine, THC. Pt had rectal bleeding from an internal hemorrhoid after several medications were given and temps were taken rectally. OSH did not have cath lab. Pt was directly admitted to [facility] for higher level of care and cardiology evaluation. On arrival, troponin trended up to 5.8 and EKG revealed STEMI.

## 2023-07-10 PROBLEM — R65.10 SIRS (SYSTEMIC INFLAMMATORY RESPONSE SYNDROME): Status: ACTIVE | Noted: 2023-07-09

## 2023-07-10 LAB
ALBUMIN SERPL BCP-MCNC: 3 G/DL (ref 3.5–5.2)
ALLENS TEST: ABNORMAL
ALLENS TEST: ABNORMAL
ALP SERPL-CCNC: 90 U/L (ref 55–135)
ALT SERPL W/O P-5'-P-CCNC: 154 U/L (ref 10–44)
ANION GAP SERPL CALC-SCNC: 11 MMOL/L (ref 8–16)
APTT PPP: 109.1 SEC (ref 21–32)
APTT PPP: 27.9 SEC (ref 21–32)
APTT PPP: 37.6 SEC (ref 21–32)
APTT PPP: 37.8 SEC (ref 21–32)
APTT PPP: 45.2 SEC (ref 21–32)
AST SERPL-CCNC: 377 U/L (ref 10–40)
BASOPHILS # BLD AUTO: 0.03 K/UL (ref 0–0.2)
BASOPHILS # BLD AUTO: 0.03 K/UL (ref 0–0.2)
BASOPHILS NFR BLD: 0.5 % (ref 0–1.9)
BASOPHILS NFR BLD: 0.5 % (ref 0–1.9)
BILIRUB SERPL-MCNC: 1.3 MG/DL (ref 0.1–1)
BUN SERPL-MCNC: 7 MG/DL (ref 8–23)
CALCIUM SERPL-MCNC: 8.5 MG/DL (ref 8.7–10.5)
CHLORIDE SERPL-SCNC: 100 MMOL/L (ref 95–110)
CO2 SERPL-SCNC: 23 MMOL/L (ref 23–29)
CREAT SERPL-MCNC: 0.8 MG/DL (ref 0.5–1.4)
DELSYS: ABNORMAL
DIFFERENTIAL METHOD: ABNORMAL
DIFFERENTIAL METHOD: ABNORMAL
EOSINOPHIL # BLD AUTO: 0.2 K/UL (ref 0–0.5)
EOSINOPHIL # BLD AUTO: 0.2 K/UL (ref 0–0.5)
EOSINOPHIL NFR BLD: 2.7 % (ref 0–8)
EOSINOPHIL NFR BLD: 2.7 % (ref 0–8)
ERYTHROCYTE [DISTWIDTH] IN BLOOD BY AUTOMATED COUNT: 14.5 % (ref 11.5–14.5)
ERYTHROCYTE [DISTWIDTH] IN BLOOD BY AUTOMATED COUNT: 14.5 % (ref 11.5–14.5)
ERYTHROCYTE [SEDIMENTATION RATE] IN BLOOD BY WESTERGREN METHOD: 16 MM/H
EST. GFR  (NO RACE VARIABLE): >60 ML/MIN/1.73 M^2
FLOW: 2
GLUCOSE SERPL-MCNC: 82 MG/DL (ref 70–110)
HCO3 UR-SCNC: 27.3 MMOL/L (ref 24–28)
HCO3 UR-SCNC: 28.5 MMOL/L (ref 24–28)
HCT VFR BLD AUTO: 35.3 % (ref 40–54)
HCT VFR BLD AUTO: 35.3 % (ref 40–54)
HGB BLD-MCNC: 12.2 G/DL (ref 14–18)
HGB BLD-MCNC: 12.2 G/DL (ref 14–18)
IMM GRANULOCYTES # BLD AUTO: 0.02 K/UL (ref 0–0.04)
IMM GRANULOCYTES # BLD AUTO: 0.02 K/UL (ref 0–0.04)
IMM GRANULOCYTES NFR BLD AUTO: 0.4 % (ref 0–0.5)
IMM GRANULOCYTES NFR BLD AUTO: 0.4 % (ref 0–0.5)
LACTATE SERPL-SCNC: 2.2 MMOL/L (ref 0.5–2.2)
LACTATE SERPL-SCNC: 2.3 MMOL/L (ref 0.5–2.2)
LYMPHOCYTES # BLD AUTO: 1.1 K/UL (ref 1–4.8)
LYMPHOCYTES # BLD AUTO: 1.1 K/UL (ref 1–4.8)
LYMPHOCYTES NFR BLD: 19.2 % (ref 18–48)
LYMPHOCYTES NFR BLD: 19.2 % (ref 18–48)
MAGNESIUM SERPL-MCNC: 1.8 MG/DL (ref 1.6–2.6)
MCH RBC QN AUTO: 31.3 PG (ref 27–31)
MCH RBC QN AUTO: 31.3 PG (ref 27–31)
MCHC RBC AUTO-ENTMCNC: 34.6 G/DL (ref 32–36)
MCHC RBC AUTO-ENTMCNC: 34.6 G/DL (ref 32–36)
MCV RBC AUTO: 91 FL (ref 82–98)
MCV RBC AUTO: 91 FL (ref 82–98)
MODE: ABNORMAL
MONOCYTES # BLD AUTO: 0.4 K/UL (ref 0.3–1)
MONOCYTES # BLD AUTO: 0.4 K/UL (ref 0.3–1)
MONOCYTES NFR BLD: 6.7 % (ref 4–15)
MONOCYTES NFR BLD: 6.7 % (ref 4–15)
NEUTROPHILS # BLD AUTO: 3.9 K/UL (ref 1.8–7.7)
NEUTROPHILS # BLD AUTO: 3.9 K/UL (ref 1.8–7.7)
NEUTROPHILS NFR BLD: 70.5 % (ref 38–73)
NEUTROPHILS NFR BLD: 70.5 % (ref 38–73)
NRBC BLD-RTO: 0 /100 WBC
NRBC BLD-RTO: 0 /100 WBC
PCO2 BLDA: 42.4 MMHG (ref 35–45)
PCO2 BLDA: 47.5 MMHG (ref 35–45)
PH SMN: 7.39 [PH] (ref 7.35–7.45)
PH SMN: 7.42 [PH] (ref 7.35–7.45)
PHOSPHATE SERPL-MCNC: 2 MG/DL (ref 2.7–4.5)
PLATELET # BLD AUTO: 93 K/UL (ref 150–450)
PLATELET # BLD AUTO: 93 K/UL (ref 150–450)
PMV BLD AUTO: 11.1 FL (ref 9.2–12.9)
PMV BLD AUTO: 11.1 FL (ref 9.2–12.9)
PO2 BLDA: 20 MMHG (ref 80–100)
PO2 BLDA: 30 MMHG (ref 40–60)
POC BE: 3 MMOL/L
POC BE: 4 MMOL/L
POC SATURATED O2: 32 % (ref 95–100)
POC SATURATED O2: 58 % (ref 95–100)
POC TCO2: 29 MMOL/L (ref 24–29)
POC TCO2: 30 MMOL/L (ref 23–27)
POTASSIUM SERPL-SCNC: 3.9 MMOL/L (ref 3.5–5.1)
PROT SERPL-MCNC: 6.9 G/DL (ref 6–8.4)
RBC # BLD AUTO: 3.9 M/UL (ref 4.6–6.2)
RBC # BLD AUTO: 3.9 M/UL (ref 4.6–6.2)
SAMPLE: ABNORMAL
SAMPLE: ABNORMAL
SITE: ABNORMAL
SITE: ABNORMAL
SODIUM SERPL-SCNC: 134 MMOL/L (ref 136–145)
SP02: 100
TROPONIN I SERPL DL<=0.01 NG/ML-MCNC: 2.99 NG/ML (ref 0–0.03)
TROPONIN I SERPL DL<=0.01 NG/ML-MCNC: 4.42 NG/ML (ref 0–0.03)
TROPONIN I SERPL DL<=0.01 NG/ML-MCNC: 5.86 NG/ML (ref 0–0.03)
TROPONIN I SERPL DL<=0.01 NG/ML-MCNC: 5.86 NG/ML (ref 0–0.03)
VANCOMYCIN TROUGH SERPL-MCNC: 15.5 UG/ML (ref 10–22)
WBC # BLD AUTO: 5.51 K/UL (ref 3.9–12.7)
WBC # BLD AUTO: 5.51 K/UL (ref 3.9–12.7)

## 2023-07-10 PROCEDURE — 94761 N-INVAS EAR/PLS OXIMETRY MLT: CPT

## 2023-07-10 PROCEDURE — 80202 ASSAY OF VANCOMYCIN: CPT | Performed by: PSYCHIATRY & NEUROLOGY

## 2023-07-10 PROCEDURE — 85730 THROMBOPLASTIN TIME PARTIAL: CPT

## 2023-07-10 PROCEDURE — 80053 COMPREHEN METABOLIC PANEL: CPT

## 2023-07-10 PROCEDURE — 99233 PR SUBSEQUENT HOSPITAL CARE,LEVL III: ICD-10-PCS | Mod: ,,, | Performed by: NURSE PRACTITIONER

## 2023-07-10 PROCEDURE — 25000003 PHARM REV CODE 250: Performed by: PSYCHIATRY & NEUROLOGY

## 2023-07-10 PROCEDURE — 27000221 HC OXYGEN, UP TO 24 HOURS

## 2023-07-10 PROCEDURE — 99900035 HC TECH TIME PER 15 MIN (STAT)

## 2023-07-10 PROCEDURE — 99233 SBSQ HOSP IP/OBS HIGH 50: CPT | Mod: ,,, | Performed by: NURSE PRACTITIONER

## 2023-07-10 PROCEDURE — 83735 ASSAY OF MAGNESIUM: CPT

## 2023-07-10 PROCEDURE — 20600001 HC STEP DOWN PRIVATE ROOM

## 2023-07-10 PROCEDURE — 63600175 PHARM REV CODE 636 W HCPCS: Performed by: PSYCHIATRY & NEUROLOGY

## 2023-07-10 PROCEDURE — 84484 ASSAY OF TROPONIN QUANT: CPT | Performed by: NURSE PRACTITIONER

## 2023-07-10 PROCEDURE — 85730 THROMBOPLASTIN TIME PARTIAL: CPT | Mod: 91 | Performed by: PSYCHIATRY & NEUROLOGY

## 2023-07-10 PROCEDURE — 25000003 PHARM REV CODE 250

## 2023-07-10 PROCEDURE — 83605 ASSAY OF LACTIC ACID: CPT | Performed by: NURSE PRACTITIONER

## 2023-07-10 PROCEDURE — 85025 COMPLETE CBC W/AUTO DIFF WBC: CPT

## 2023-07-10 PROCEDURE — 84100 ASSAY OF PHOSPHORUS: CPT

## 2023-07-10 PROCEDURE — 63600175 PHARM REV CODE 636 W HCPCS

## 2023-07-10 PROCEDURE — 82803 BLOOD GASES ANY COMBINATION: CPT

## 2023-07-10 RX ADMIN — ACYCLOVIR SODIUM 600 MG: 1000 INJECTION, SOLUTION INTRAVENOUS at 12:07

## 2023-07-10 RX ADMIN — AMPICILLIN 2 G: 2 INJECTION, POWDER, FOR SOLUTION INTRAMUSCULAR; INTRAVENOUS at 11:07

## 2023-07-10 RX ADMIN — AMPICILLIN 2 G: 2 INJECTION, POWDER, FOR SOLUTION INTRAMUSCULAR; INTRAVENOUS at 05:07

## 2023-07-10 RX ADMIN — Medication 100 MG: at 08:07

## 2023-07-10 RX ADMIN — ACYCLOVIR SODIUM 600 MG: 1000 INJECTION, SOLUTION INTRAVENOUS at 08:07

## 2023-07-10 RX ADMIN — VANCOMYCIN HYDROCHLORIDE 750 MG: 750 INJECTION, POWDER, LYOPHILIZED, FOR SOLUTION INTRAVENOUS at 11:07

## 2023-07-10 RX ADMIN — ASPIRIN 81 MG 81 MG: 81 TABLET ORAL at 08:07

## 2023-07-10 RX ADMIN — PHENOBARBITAL 30 MG: 30 TABLET ORAL at 08:07

## 2023-07-10 RX ADMIN — AMPICILLIN 2 G: 2 INJECTION, POWDER, FOR SOLUTION INTRAMUSCULAR; INTRAVENOUS at 02:07

## 2023-07-10 RX ADMIN — FOLIC ACID 1 MG: 1 TABLET ORAL at 09:07

## 2023-07-10 RX ADMIN — SENNOSIDES AND DOCUSATE SODIUM 1 TABLET: 50; 8.6 TABLET ORAL at 08:07

## 2023-07-10 RX ADMIN — HEPARIN SODIUM 11 UNITS/KG/HR: 10000 INJECTION, SOLUTION INTRAVENOUS at 06:07

## 2023-07-10 RX ADMIN — MUPIROCIN: 20 OINTMENT TOPICAL at 08:07

## 2023-07-10 RX ADMIN — CEFEPIME 2 G: 2 INJECTION, POWDER, FOR SOLUTION INTRAVENOUS at 07:07

## 2023-07-10 RX ADMIN — CLOPIDOGREL BISULFATE 75 MG: 75 TABLET ORAL at 08:07

## 2023-07-10 RX ADMIN — ACYCLOVIR SODIUM 600 MG: 1000 INJECTION, SOLUTION INTRAVENOUS at 03:07

## 2023-07-10 NOTE — ASSESSMENT & PLAN NOTE
[facility] EKG noted to have hyperacute T waves in anteroseptal leads and troponin trending up from 1.7 to 4.7  Received rectal ASA and started on heparin gtt   Utox positive for cocaine.   On arrival, troponin trended up to 5.8 and EKG revealed STEMI      Continue low intensity heparin gtt    Trending troponin    O2    Consulted interventional cardiology, recs:    Not a candidate for cath lab due to sepsis and out of window    loaded with ASA rectally prior to arrival here   Plavix load 600 via NG tube   Cont hep gtt   Bedside echo w EF 20%, apical ballooning and normal wall motion at the bases concerning for Tokutsubo pattern   Start ASA 81 qd and plavix 75 qd   Atorvastatin 80   Formal echo    F/up with cardiology post discharge    General cardiology consult for further management: Repeat TTE in 1-2 weeks. Patient will need GDMT upon discharge including SGLT2i and Entresto   Cards following   7/10- still on heparin drip

## 2023-07-10 NOTE — ASSESSMENT & PLAN NOTE
[facility] EKG noted to have hyperacute T waves in anteroseptal leads and troponin trending up from 1.7 to 4.7  Received rectal ASA and started on heparin gtt   Utox positive for cocaine.   On arrival, troponin trended up to 5.8 and EKG revealed STEMI    · Continue low intensity heparin gtt   · Trending troponin   · O2   · Consulted interventional cardiology, recs:   · Not a candidate for cath lab due to sepsis and out of window   · loaded with ASA rectally prior to arrival here  · Plavix load 600 via NG tube  · Cont hep gtt  · Bedside echo w EF 20%, apical ballooning and normal wall motion at the bases concerning for Tokutsubo pattern  · Start ASA 81 qd and plavix 75 qd  · Atorvastatin 80  · Formal echo   · F/up with cardiology post discharge   · General cardiology consult for further management   · Cards following

## 2023-07-10 NOTE — SUBJECTIVE & OBJECTIVE
Interval History: see above    Review of Systems  Objective:     Vital Signs (Most Recent):  Temp: 97.8 °F (36.6 °C) (07/10/23 1102)  Pulse: 89 (07/10/23 1402)  Resp: 20 (07/10/23 1402)  BP: (!) 163/95 (07/10/23 1402)  SpO2: 100 % (07/10/23 1402) Vital Signs (24h Range):  Temp:  [96.9 °F (36.1 °C)-98.1 °F (36.7 °C)] 97.8 °F (36.6 °C)  Pulse:  [64-96] 89  Resp:  [12-36] 20  SpO2:  [95 %-100 %] 100 %  BP: (101-163)/() 163/95     Weight: 59.9 kg (132 lb)  Body mass index is 20.07 kg/m².    Intake/Output Summary (Last 24 hours) at 7/10/2023 1507  Last data filed at 7/10/2023 1402  Gross per 24 hour   Intake 1810.57 ml   Output 350 ml   Net 1460.57 ml         Physical Exam        Significant Labs: All pertinent labs within the past 24 hours have been reviewed.  CBC:   Recent Labs   Lab 07/09/23  0006 07/09/23  0406 07/10/23  0423   WBC 6.25 6.94  6.94 5.51  5.51   HGB 10.9* 11.0*  11.0* 12.2*  12.2*   HCT 31.8* 33.3*  33.3* 35.3*  35.3*   PLT 96* 108*  108* 93*  93*     CMP:   Recent Labs   Lab 07/09/23  0009 07/10/23  0423   * 134*   K 3.4* 3.9    100   CO2 21* 23    82   BUN 8 7*   CREATININE 0.9 0.8   CALCIUM 8.7 8.5*   PROT 6.8 6.9   ALBUMIN 3.1* 3.0*   BILITOT 1.0 1.3*   ALKPHOS 101 90   * 377*   ALT 55* 154*   ANIONGAP 10 11       Significant Imaging: I have reviewed all pertinent imaging results/findings within the past 24 hours.

## 2023-07-10 NOTE — SUBJECTIVE & OBJECTIVE
Interval History: Patient Aox3 this morning. Denies chest pain, SOB, lower extremity swelling.     Review of Systems   Cardiovascular:  Negative for chest pain, dyspnea on exertion, leg swelling, palpitations and syncope.   Objective:     Vital Signs (Most Recent):  Temp: 97.6 °F (36.4 °C) (07/10/23 07)  Pulse: 73 (07/10/23 0802)  Resp: (!) 25 (07/10/23 08)  BP: 132/81 (07/10/23 0700)  SpO2: 96 % (07/10/23 08) Vital Signs (24h Range):  Temp:  [96.9 °F (36.1 °C)-99.7 °F (37.6 °C)] 97.6 °F (36.4 °C)  Pulse:  [] 73  Resp:  [12-36] 25  SpO2:  [95 %-100 %] 96 %  BP: (101-136)/(60-91) 132/81     Weight: 59.9 kg (132 lb)  Body mass index is 20.07 kg/m².     SpO2: 96 %         Intake/Output Summary (Last 24 hours) at 7/10/2023 0830  Last data filed at 7/10/2023 0601  Gross per 24 hour   Intake 2058.26 ml   Output 900 ml   Net 1158.26 ml       Lines/Drains/Airways       Drain  Duration                  NG/OG Tube 23 0000 14 Fr. Right nostril 1 day    Male External Urinary Catheter 23 2250 1 day              Peripheral Intravenous Line  Duration                  Peripheral IV - Single Lumen 23 1659 18 G Anterior;Right Upper Arm 1 day         Peripheral IV - Single Lumen 23 1800 20 G Anterior;Distal;Left Upper Arm 1 day         Peripheral IV - Single Lumen 23 1530 20 G Right;Posterior Hand <1 day                       Physical Exam  Cardiovascular:      Rate and Rhythm: Normal rate and regular rhythm.   Pulmonary:      Effort: Pulmonary effort is normal. No respiratory distress.      Breath sounds: No wheezing.          Significant Labs: {Labs:18637}    Significant Imaging: {Imagin}

## 2023-07-10 NOTE — ASSESSMENT & PLAN NOTE
62yo M w PMHx of HTN, CAD, EtOH use, and seizures on keppra presenting to OSH ED via EMS for evaluation of a seizure after being found post-ictal on his couch. Breakthrough seizures q1-2 months recently. Lactic and CK elevated.    Pt was found to be septic. He received rectal acetaminophen. Started on vanc, zosyn, acyclovir, and rocephin. Fluid resuscitated 2L LR.   LP was performed (glucose 82, protein 42, WBC 1) and labs sent. Add on labs ordered  Unclear when pt had his last drink. EtOH was WNL c/f possible alcohol withdrawal or delirium tremens.             [facility] CTH negative for acute findings      Admit to NCC   q1h neuro checks and vitals   CBC, CMP, mag, phos daily    TSH, A1c, lipid panel, coags, Utox, EtOH   HSV PCR   Echo, EKG, CXR   vEEG  o Appreciate epilepsy recs    Keppra loaded w 2.5g     Keppra 1000 BID    F/u LP labs  o Add on ordered: Meningitis encephalitis panel  o Add on ordered: [place] send out- CSFME   Vanc, cefepime, ampicillin, acyclovir    SCD; hold chemical VTE ppx in acute period    PT/OT/SLP   Avoid agents that reduce seizure threshold    Seizure precautions   Patient has alcohol withdrawal seizures that does not require AED's

## 2023-07-10 NOTE — PLAN OF CARE
[facility]  Initial Discharge Assessment       Primary Care Provider: To Obtain Unable    Admission Diagnosis: Seizure [R56.9]  Chest pain [R07.9]  Cocaine use [F14.90]  Altered mental status, unspecified altered mental status type [R41.82]  Sepsis, due to unspecified organism, unspecified whether acute organ dysfunction present [A41.9]    Admission Date: 7/8/2023  Expected Discharge Date: 7/17/2023    Transition of Care Barriers: Underinsured    Payor: MEDICAID / Plan: [place] [employer] / Product Type: Managed Medicaid /     Extended Emergency Contact Information  Primary Emergency Contact: Siobhan Xiao  Mobile Phone: [phone]  Relation: Sister  Preferred language: English   needed? No  Secondary Emergency Contact: TinoAlberto  Mobile Phone: [phone]  Relation: Sister   needed? No    Discharge Plan A: Home with family  Discharge Plan B: [id number] - [address]  [facility] [address]  Phone: [phone] Fax: [phone]        Transferred from:  home    Past Medical History:   Diagnosis Date    Seizures          CM met with patient in room for Discharge Planning Assessment.  Patient is able to answer questions.  Per patient, he lives alone in his niece's guest house with 0 step(s) to enter.   Per patient, he was independent with ADLS and used no dme for ambulation.  Patient will have assistance from his sister and niece upon discharge.   Discharge Planning Booklet given to patient/family and discussed.  All questions addressed.  CM will follow for needs.      Initial Assessment (most recent)       Adult Discharge Assessment - 07/10/23 1555          Discharge Assessment    Assessment Type Discharge Planning Assessment     Confirmed/corrected address, phone number and insurance Yes     Confirmed Demographics Correct on Facesheet     Source of Information patient      Communicated BROOKE with patient/caregiver Date not available/Unable to determine     Reason For Admission Seizure     People in Home alone     Facility Arrived From: home     Do you expect to return to your current living situation? Yes     Do you have help at home or someone to help you manage your care at home? Yes     Who are your caregiver(s) and their phone number(s)? Siobhan Xiao (sister) [phone]     Prior to hospitilization cognitive status: Alert/Oriented     Current cognitive status: Alert/Oriented     Walking or Climbing Stairs --   independent    Dressing/Bathing --   independent    Home Accessibility stairs to enter home     Number of Stairs, Main Entrance none     Home Layout Able to live on 1st floor     Equipment Currently Used at Home none     Readmission within 30 days? No     Patient currently being followed by outpatient case management? No     Do you currently have service(s) that help you manage your care at home? No     Do you take prescription medications? Yes     Do you have prescription coverage? Yes     Coverage [employer]     Do you have any problems affording any of your prescribed medications? No     Is the patient taking medications as prescribed? yes     Who is going to help you get home at discharge? Siobhan Xiao (sister) [phone]     How do you get to doctors appointments? family or friend will provide     Are you on dialysis? No     Do you take coumadin? No     Discharge Plan A Home with family     Discharge Plan B Home Health     DME Needed Upon Discharge  other (see comments)   tbd    Discharge Plan discussed with: Patient     Transition of Care Barriers Underinsured        Physical Activity    On average, how many days per week do you engage in moderate to strenuous exercise (like a brisk walk)? 7 days     On average, how many minutes do you engage in exercise at this level? 60 min        Financial Resource Strain    How hard is it for you to pay for the very  basics like food, housing, medical care, and heating? Not hard at all        Housing Stability    In the last 12 months, was there a time when you were not able to pay the mortgage or rent on time? No     In the last 12 months, how many places have you lived? 2     In the last 12 months, was there a time when you did not have a steady place to sleep or slept in a shelter (including now)? No        Transportation Needs    In the past 12 months, has lack of transportation kept you from medical appointments or from getting medications? No     In the past 12 months, has lack of transportation kept you from meetings, work, or from getting things needed for daily living? No        Food Insecurity    Within the past 12 months, you worried that your food would run out before you got the money to buy more. Never true     Within the past 12 months, the food you bought just didn't last and you didn't have money to get more. Never true        Stress    Do you feel stress - tense, restless, nervous, or anxious, or unable to sleep at night because your mind is troubled all the time - these days? Not at all        Social Connections    In a typical week, how many times do you talk on the phone with family, friends, or neighbors? More than three times a week     How often do you get together with friends or relatives? More than three times a week     How often do you attend Orthodoxy or Adventist services? More than 4 times per year     Do you belong to any clubs or organizations such as Orthodoxy groups, unions, fraternal or athletic groups, or school groups? No     How often do you attend meetings of the clubs or organizations you belong to? Never     Are you , , , , never , or living with a partner?         Alcohol Use    Q1: How often do you have a drink containing alcohol? 4 or more times a week     Q2: How many drinks containing alcohol do you have on a typical day when you are  drinking? 5 or 6     Q3: How often do you have six or more drinks on one occasion? Daily or almost daily                          Laura Blancas RN, CCRN-K, [facility]  Neuro-Critical Care   [phone] [id number]

## 2023-07-10 NOTE — PLAN OF CARE
[facility] Care Plan    POC reviewed with Paty Jiménez and family at 1400. Pt verbalized understanding. Questions and concerns addressed. No acute events today. Pt progressing toward goals. Will continue to monitor. See below and flowsheets for full assessment and VS info.             Is this a stroke patient? no    Neuro:  Thanh Coma Scale  Best Eye Response: 4-->(E4) spontaneous  Best Motor Response: 6-->(M6) obeys commands  Best Verbal Response: 5-->(V5) oriented  Thanh Coma Scale Score: 15  Assessment Qualifiers: no eye obstruction present, patient not sedated/intubated  Pupil PERRLA: yes     24 hr Temp:  [96.9 °F (36.1 °C)-98.1 °F (36.7 °C)]     CV:   Rhythm: sinus arrhythmia  BP goals:   SBP < 160  MAP > 65    Resp:           Plan: N/A    GI/:     Diet/Nutrition Received: NPO  Last Bowel Movement: 07/10/23  Voiding Characteristics: voids spontaneously without difficulty    Intake/Output Summary (Last 24 hours) at 7/10/2023 1707  Last data filed at 7/10/2023 1702  Gross per 24 hour   Intake 1509.43 ml   Output 150 ml   Net 1359.43 ml     Unmeasured Output  Urine Occurrence: 1  Stool Occurrence: 1    Labs/Accuchecks:  Recent Labs   Lab 07/10/23  0423   WBC 5.51  5.51   RBC 3.90*  3.90*   HGB 12.2*  12.2*   HCT 35.3*  35.3*   PLT 93*  93*      Recent Labs   Lab 07/10/23  0423   *   K 3.9   CO2 23      BUN 7*   CREATININE 0.8   ALKPHOS 90   *   *   BILITOT 1.3*      Recent Labs   Lab 07/09/23  0006 07/09/23  0406 07/10/23  1517   INR 1.4*  1.4*  --   --    APTT 79.1*  79.1*   < > 27.9    < > = values in this interval not displayed.      Recent Labs   Lab 07/08/23  2143 07/09/23  0406 07/10/23  1258   *  --   --    TROPONINI 5.874*   < > 2.989*    < > = values in this interval not displayed.       Electrolytes: N/A - electrolytes WDL  Accuchecks: none    Gtts:   heparin (porcine) in D5W Stopped (07/10/23 4913)       LDA/Wounds:  Lines/Drains/Airways       Peripheral  Intravenous Line  Duration                  Peripheral IV - Single Lumen 07/08/23 1659 18 G Anterior;Right Upper Arm 2 days         Peripheral IV - Single Lumen 07/08/23 1800 20 G Anterior;Distal;Left Upper Arm 1 day                  Wounds: No  Wound care consulted: No

## 2023-07-10 NOTE — ASSESSMENT & PLAN NOTE
64yo M w PMHx of HTN, CAD, EtOH use, and seizures on keppra presenting to OSH ED via EMS for evaluation of a seizure after being found post-ictal on his couch. Breakthrough seizures q1-2 months recently. Lactic and CK elevated.     Pt was found to be septic. He received rectal acetaminophen. Started on vanc, zosyn, acyclovir, and rocephin. Fluid resuscitated 2L LR.   LP was performed (glucose 82, protein 42, WBC 1) and labs sent. Add on labs ordered  Unclear when pt had his last drink. EtOH was WNL c/f possible alcohol withdrawal or delirium tremens.             OSH CTH negative for acute findings       Admit to [facility]   q1h neuro checks and vitals   CBC, CMP, mag, phos daily    TSH, A1c, lipid panel, coags, Utox, EtOH   HSV PCR   Echo, EKG, CXR   vEEG  ? Appreciate epilepsy recs    Keppra loaded w 2.5g     Keppra 1000 BID    F/u LP labs  ? Add on ordered: Meningitis encephalitis panel  ? Add on ordered: Sharon send out- CSFME   Vanc, cefepime, ampicillin, acyclovir    SCD; hold chemical VTE ppx in acute period    PT/OT/SLP   Avoid agents that reduce seizure threshold    Seizure precautions   Patient has alcohol withdrawal seizures that does not require AED's . Phenobarbital weaned off in NCC.

## 2023-07-10 NOTE — PROGRESS NOTES
[facility] - Neuro Critical Care  [facility] Medicine  IMN Transfer Acceptance Note    Patient Name: Paty Jiménez  MRN: [id number]  Patient Class: IP- Inpatient   Admission Date: 7/8/2023  Length of Stay: 2 days  Attending Physician: Brittney Preciado MD  Primary Care Provider: To Obtain Unable        Subjective:     Principal Problem:Seizure        HPI:  64yo M w PMHx of HTN, CAD, EtOH use, and seizures on keppra presenting to OSH ED via EMS for evaluation of a seizure. Pt lives in his niece's guest house. Niece found him sitting on the couch around 10am sitting in urine and feces with his eyes rolled back and mouth bleeding. Niece said that this happens once every 1-2 months and pt is brought to the hospital with workups revealing seizures. The last seizures were in 5/2023 and 4/2023. Niece's daughter checks on the pt daily to see if he takes his keppra, and pt is mostly compliant per niece. Niece said that she brought the pt water and he was able to drink it and speak close to his baseline. Pt was taken to OSH via EMS. Pt was confused and combative on arrival. Pt did not have witnessed seizures in ED. He was oriented to self and moved purposefully. Pt was found to be septic with tachycardia, increased RR, temp 103F, lactic 3.9, procal 1.7 to 4.91. He received rectal acetaminophen. Started on vanc, zosyn, acyclovir, and rocephin. Pt fluid resuscitated 2L LR. CTH was negative for acute processes. LP was performed (glucose 82, protein 42, WBC 1) and labs sent. Unclear when pt had his last drink. EtOH was undetectable c/f possible alcohol withdrawal or delirium tremens. He has required multiple doses of mostly benzodiazepines to keep his agitation under control.                 He was found to have an EKG with hyperacute T-waves in anteroseptal leads and troponin 1.7 that trended up to 4.7. Pt was given rectal aspirin, started on heparin drip, and transferred for further evaluation and workup. Utox positive for cocaine,  THC. Pt had rectal bleeding from an internal hemorrhoid after several medications were given and temps were taken rectally. OSH did not have cath lab. Pt was directly admitted to [facility] for higher level of care and cardiology evaluation. On arrival, troponin trended up to 5.8 and EKG revealed STEMI.         Hospital Course: 7/10/23: ok for step down to hospital medicine         Overview/Hospital Course:  No notes on file    Interval History: see above    Review of Systems  Objective:     Vital Signs (Most Recent):  Temp: 97.8 °F (36.6 °C) (07/10/23 1102)  Pulse: 89 (07/10/23 1402)  Resp: 20 (07/10/23 1402)  BP: (!) 163/95 (07/10/23 1402)  SpO2: 100 % (07/10/23 1402) Vital Signs (24h Range):  Temp:  [96.9 °F (36.1 °C)-98.1 °F (36.7 °C)] 97.8 °F (36.6 °C)  Pulse:  [64-96] 89  Resp:  [12-36] 20  SpO2:  [95 %-100 %] 100 %  BP: (101-163)/() 163/95     Weight: 59.9 kg (132 lb)  Body mass index is 20.07 kg/m².    Intake/Output Summary (Last 24 hours) at 7/10/2023 1507  Last data filed at 7/10/2023 1402  Gross per 24 hour   Intake 1810.57 ml   Output 350 ml   Net 1460.57 ml         Physical Exam        Significant Labs: All pertinent labs within the past 24 hours have been reviewed.  CBC:   Recent Labs   Lab 07/09/23  0006 07/09/23  0406 07/10/23  0423   WBC 6.25 6.94  6.94 5.51  5.51   HGB 10.9* 11.0*  11.0* 12.2*  12.2*   HCT 31.8* 33.3*  33.3* 35.3*  35.3*   PLT 96* 108*  108* 93*  93*     CMP:   Recent Labs   Lab 07/09/23  0009 07/10/23  0423   * 134*   K 3.4* 3.9    100   CO2 21* 23    82   BUN 8 7*   CREATININE 0.9 0.8   CALCIUM 8.7 8.5*   PROT 6.8 6.9   ALBUMIN 3.1* 3.0*   BILITOT 1.0 1.3*   ALKPHOS 101 90   * 377*   ALT 55* 154*   ANIONGAP 10 11       Significant Imaging: I have reviewed all pertinent imaging results/findings within the past 24 hours.      Assessment/Plan:      * Seizure  62yo M w PMHx of HTN, CAD, EtOH use, and seizures on keppra presenting to OSH ED via EMS  for evaluation of a seizure after being found post-ictal on his couch. Breakthrough seizures q1-2 months recently. Lactic and CK elevated.     Pt was found to be septic. He received rectal acetaminophen. Started on vanc, zosyn, acyclovir, and rocephin. Fluid resuscitated 2L LR.   LP was performed (glucose 82, protein 42, WBC 1) and labs sent. Add on labs ordered  Unclear when pt had his last drink. EtOH was WNL c/f possible alcohol withdrawal or delirium tremens.             OSH CTH negative for acute findings       Admit to [facility]   q1h neuro checks and vitals   CBC, CMP, mag, phos daily    TSH, A1c, lipid panel, coags, Utox, EtOH   HSV PCR   Echo, EKG, CXR   vEEG  ? Appreciate epilepsy recs    Keppra loaded w 2.5g     Keppra 1000 BID    F/u LP labs  ? Add on ordered: Meningitis encephalitis panel  ? Add on ordered: [place] send out- CSFME   Vanc, cefepime, ampicillin, acyclovir    SCD; hold chemical VTE ppx in acute period    PT/OT/SLP   Avoid agents that reduce seizure threshold    Seizure precautions   Patient has alcohol withdrawal seizures that does not require AED's . Phenobarbital weaned off in NCC.    Encephalopathy acute  Due to seizure, JUAN  RESOLVED      STEMI (ST elevation myocardial infarction)  OSH EKG noted to have hyperacute T waves in anteroseptal leads and troponin trending up from 1.7 to 4.7  Received rectal ASA and started on heparin gtt   Utox positive for cocaine.   On arrival, troponin trended up to 5.8 and EKG revealed STEMI      Continue low intensity heparin gtt    Trending troponin    O2    Consulted interventional cardiology, recs:    Not a candidate for cath lab due to sepsis and out of window    loaded with ASA rectally prior to arrival here   Plavix load 600 via NG tube   Cont hep gtt   Bedside echo w EF 20%, apical ballooning and normal wall motion at the bases concerning for Tokutsubo pattern   Start ASA 81 qd and plavix 75 qd   Atorvastatin 80   Formal echo     F/up with cardiology post discharge    General cardiology consult for further management: Repeat TTE in 1-2 weeks. Patient will need GDMT upon discharge including SGLT2i and Entresto   Cards following   7/10- still on heparin drip    SIRS (systemic inflammatory response syndrome)  This patient does not have evidence of infective focus  My overall impression is SIRS uon admission due to seizure .  Cultures from 7/8 and 7/9 are negative  Antibiotics given-   Antibiotics (72h ago, onward)    Start     Stop Route Frequency Ordered    07/08/23 2145  mupirocin 2 % ointment         07/13 2059 Nasl 2 times daily 07/08/23 2039        Latest lactate reviewed-  Recent Labs   Lab 07/09/23  2350 07/10/23  0423 07/10/23  1258   LACTATE 2.2  2.2 2.3* 2.2       HTN (hypertension)  SBP <160      Elevated troponin  See stemi  Cards following    Tobacco use disorder  Nicotine patch prn              Alcohol abuse  Unclear when pt had his last drink. EtOH was undetectable c/f possible alcohol withdrawal or delirium tremens. He has required multiple doses of mostly benzodiazepines to keep his agitation under control.       CIWA protocol    PETH, ethanol     LFTs    Thiamine    Folate   Prn valium           VTE Risk Mitigation (From admission, onward)         Ordered     heparin 25,000 units in dextrose 5% (100 units/ml) IV bolus from bag - ADDITIONAL PRN BOLUS - 60 units/kg (max bolus 4000 units)  As needed (PRN)        Question:  Heparin Infusion Adjustment (DO NOT MODIFY ANSWER)  Answer:  [email or web address]\Images\Pharmacy\HeparinInfusions\heparin LOW INTENSITY nomogram for [facility] [id number]    07/08/23 2343     heparin 25,000 units in dextrose 5% (100 units/ml) IV bolus from bag - ADDITIONAL PRN BOLUS - 30 units/kg (max bolus 4000 units)  As needed (PRN)        Question:  Heparin Infusion Adjustment (DO NOT MODIFY ANSWER)  Answer:  [email or web address]\Images\Pharmacy\HeparinInfusions\heparin LOW INTENSITY nomogram for [facility]  [id number]    07/08/23 2343     heparin 25,000 units in dextrose 5% (100 units/ml) IV bolus from bag INITIAL BOLUS (max bolus 4000 units)  Once        Question:  Heparin Infusion Adjustment (DO NOT MODIFY ANSWER)  Answer:  [email or web address]\Images\Pharmacy\HeparinInfusions\heparin LOW INTENSITY nomogram for [facility] [id number]    07/08/23 2343     heparin 25,000 units in dextrose 5% 250 mL (100 units/mL) infusion LOW INTENSITY nomogram - [facility]  Continuous        Question Answer Comment   Heparin Infusion Adjustment (DO NOT MODIFY ANSWER) [email or web address]\[email or web address]\Images\Pharmacy\HeparinInfusions\heparin LOW INTENSITY nomogram for [facility] [id number]    Begin at (in units/kg/hr) 12        07/08/23 2343     Place RADHA hose  Until discontinued         07/08/23 2007     IP VTE LOW RISK PATIENT  Once         07/08/23 2007                Discharge Planning   BROOKE:      Code Status: Full Code   Is the patient medically ready for discharge?:     Reason for patient still in hospital (select all that apply): Patient trending condition           Adelaida Maynard MD  Department of Hospital Medicine   [facility] - Neuro Critical Care

## 2023-07-10 NOTE — HPI
Paty Jiménez is a 63 year old Black man with cigarette smoking, alcohol abuse (drinks 3 quarts of beer daily), history of alcohol withdrawal, seizures, coronary artery disease, liver cirrhosis, anemia, thrombocytopenia, cocaine use, hypertension. He lives in [place], [place].    He presented to [facility] Emergency Department on 7/8/2023 after having a seizure. He lives in his niece's guest house. He takes levetiracetam for his seizures. His niece found him sitting on the couch around 10:00 sitting in urine and feces with his eyes rolled back and mouth bleeding. His niece said this happens once every 1 to 2 months and he is brought to the hospital each time. His last seizures were in May and April. His niece's daughter checks on him daily to see if he is taking his levetiracetam, and he is mostly compliant. His niece said that she brought him water and he was able to drink it and speak close to his baseline. He was confused and combative on arrival to the emergency department. He did not have witnessed seizures there. He was oriented to self and moved purposefully. He was found to have tachycardia, tachypnea, fever of 103° Fahrenheit, elevated lactic acid (3.9 mmol/L) and procalcitonin (1.67 ng/mL, later 4.19). He was given rectal acetaminophen, vancomycin, piperacillin-tazobactam, acyclovir, ceftriaxone, and 2 liters of lactated Ringer's solution. Head CT showed no acute process. Lumbar puncture was done and showed glucose 82, protein 42, WBC 1. It was unclear when his last alcohol drink was. Ethanol level was undetectable. He required multiple doses of mostly benzodiazepines to keep his agitation under control. EKG showed hyperacute T waves in anteroseptal leads and troponin was elevated at 1.7 ng/mL, then trended up to 4.7. He was given rectal aspirin and started on heparin drip. [facility] does not have a cardiac catheterization lab.    He was transferred [facility] and admitted to Neuro Critical Care. Urine toxicology was positive for cocaine and marijuana, consistent with known history of use. He had rectal bleeding from an internal hemorrhoid after several medications were given and temperatures were taken rectally. Troponin trended up to 5.8 and EKG showed non-ST elevation myocardial infarction.

## 2023-07-10 NOTE — ASSESSMENT & PLAN NOTE
Unclear when pt had his last drink. EtOH was undetectable c/f possible alcohol withdrawal or delirium tremens. He has required multiple doses of mostly benzodiazepines to keep his agitation under control.     · CIWA protocol   · PETH, ethanol    · LFTs   · Thiamine   · Folate  · Prn valium

## 2023-07-10 NOTE — PLAN OF CARE
62 y/o M with epilepsy, HTN, and alcohol/cocaine abuse presenting after being found down 2/2 presumed seizure. Septic on admission, on meningitis coverage abx, and NSTEMI noted without ischemic changes on EKG.     TTE with new drop in EF 40% and segmental LV WMA suggestive of Takotsubo cardiomyopathy, but LAD distribution ischemic heart disease cannot be ruled out. Discussed with IC and patient to be managed medically.     - Continue ACS protocol for total 48 hours  - Repeat TTE in 1-2 weeks  - Patient will need GDMT upon discharge including SGLT2i and Entresto  - Can re-consult if in need of assistance in further management.     Cardiology will follow.     Reynaldo Covarrubias MD PGY2  Cardiology Consults

## 2023-07-10 NOTE — ASSESSMENT & PLAN NOTE
This patient does not have evidence of infective focus  My overall impression is SIRS uon admission due to seizure .  Cultures from 7/8 and 7/9 are negative  Antibiotics given-   Antibiotics (72h ago, onward)    Start     Stop Route Frequency Ordered    07/08/23 2145  mupirocin 2 % ointment         07/13 2059 Nasl 2 times daily 07/08/23 2039        Latest lactate reviewed-  Recent Labs   Lab 07/09/23  2350 07/10/23  0423 07/10/23  1258   LACTATE 2.2  2.2 2.3* 2.2

## 2023-07-10 NOTE — HOSPITAL COURSE
7/10/23: ok for step down to hospital medicine  7/11/2023 Dc heparin, q4h neuro checks, zyprexa bid 5 mg, PECdov, Psyc consulted

## 2023-07-10 NOTE — SUBJECTIVE & OBJECTIVE
Interval History: ok to step down to hospital medicine with cards following, phenobarbital taper.    Review of Systems   Constitutional: Negative.    HENT: Negative.     Eyes: Negative.    Respiratory: Negative.     Cardiovascular: Negative.    Gastrointestinal: Negative.    Endocrine: Negative.    Genitourinary: Negative.    Musculoskeletal: Negative.    Skin: Negative.    Neurological: Negative.        Vitals:  Temp: 97.8 °F (36.6 °C)  Pulse: 82  Rhythm: sinus arrhythmia  BP: (!) 142/106  MAP (mmHg): 120  Resp: 19  SpO2: 99 %    Temp  Min: 96.9 °F (36.1 °C)  Max: 99.7 °F (37.6 °C)  Pulse  Min: 64  Max: 96  BP  Min: 101/60  Max: 142/106  MAP (mmHg)  Min: 75  Max: 120  Resp  Min: 12  Max: 36  SpO2  Min: 95 %  Max: 100 %    07/09 0701 - 07/10 0700  In: 2292.6 [P.O.:50; I.V.:125.8]  Out: 900 [Urine:900]   Unmeasured Output  Urine Occurrence: 1  Stool Occurrence: 1        Physical Exam  Vitals and nursing note reviewed.   Constitutional:       Appearance: Normal appearance.   HENT:      Head: Normocephalic.      Nose: Nose normal.      Mouth/Throat:      Mouth: Mucous membranes are moist.      Pharynx: Oropharynx is clear.   Eyes:      Pupils: Pupils are equal, round, and reactive to light.   Cardiovascular:      Rate and Rhythm: Normal rate and regular rhythm.      Pulses: Normal pulses.      Heart sounds: Normal heart sounds.   Pulmonary:      Effort: Pulmonary effort is normal.      Breath sounds: Normal breath sounds.   Abdominal:      General: Bowel sounds are normal.      Palpations: Abdomen is soft.   Musculoskeletal:         General: Normal range of motion.   Skin:     General: Skin is warm and dry.      Capillary Refill: Capillary refill takes 2 to 3 seconds.   Neurological:      Mental Status: He is alert.      Comments: GCS 15  AAO X3  PERRL  YA  to command and spontaneously   Sensation Intact X4          Medications:  Continuousheparin (porcine) in D5W, Last Rate: 11 Units/kg/hr (07/10/23  1302)    Scheduledacyclovir, 10 mg/kg, Q8H  aspirin, 81 mg, Daily  clopidogreL, 75 mg, Daily  folic acid, 1 mg, Daily  heparin (PORCINE), 60 Units/kg (Adjusted), Once  mupirocin, , BID  PHENobarbitaL, 30 mg, BID   Followed by  [START ON 7/12/2023] PHENobarbitaL, 15 mg, BID   Followed by  [START ON 7/14/2023] PHENobarbitaL, 8 mg, BID   Followed by  [START ON 7/15/2023] PHENobarbitaL, 4 mg, BID  polyethylene glycol, 17 g, Daily  senna-docusate 8.6-50 mg, 1 tablet, BID  thiamine, 100 mg, Daily    PRNacetaminophen, 1,000 mg, Q8H PRN  acetaminophen, 650 mg, Q4H PRN  heparin (PORCINE), 30 Units/kg (Adjusted), PRN  heparin (PORCINE), 60 Units/kg (Adjusted), PRN  magnesium oxide, 800 mg, PRN  magnesium oxide, 800 mg, PRN  ondansetron, 4 mg, Q8H PRN  potassium bicarbonate, 35 mEq, PRN  potassium bicarbonate, 50 mEq, PRN  potassium bicarbonate, 60 mEq, PRN  potassium, sodium phosphates, 2 packet, PRN  potassium, sodium phosphates, 2 packet, PRN  potassium, sodium phosphates, 2 packet, PRN  prochlorperazine, 5 mg, Q6H PRN  sodium chloride 0.9%, 10 mL, PRN      Today I personally reviewed pertinent medications, lines/drains/airways, imaging, cardiology results, laboratory results, microbiology results, notably:    Diet  Diet [facility]; Standard Tray  Diet [facility]; Standard Tray

## 2023-07-10 NOTE — PROGRESS NOTES
[facility] - Neuro Critical Care  Neurocritical Care  Progress Note    Admit Date: 7/8/2023  Service Date: 07/10/2023  Length of Stay: 2    Subjective:     Chief Complaint: Seizure    History of Present Illness: Paty Jiménez is a 62yo M w PMHx of HTN, CAD, EtOH use, and seizures on keppra presenting to OSH ED via EMS for evaluation of a seizure. Pt lives in his niece's guest house. Niece found him sitting on the couch around 10am sitting in urine and feces with his eyes rolled back and mouth bleeding. Niece said that this happens once every 1-2 months and pt is brought to the hospital with workups revealing seizures. The last seizures were in 5/2023 and 4/2023. Niece's daughter checks on the pt daily to see if he takes his keppra, and pt is mostly compliant per niece. Niece said that she brought the pt water and he was able to drink it and speak close to his baseline. Pt was taken to OSH via EMS. Pt was confused and combative on arrival. Pt did not have witnessed seizures in ED. He was oriented to self and moved purposefully. Pt was found to be septic with tachycardia, increased RR, temp 103F, lactic 3.9, procal 1.7 to 4.91. He received rectal acetaminophen. Started on vanc, zosyn, acyclovir, and rocephin. Pt fluid resuscitated 2L LR. CTH was negative for acute processes. LP was performed (glucose 82, protein 42, WBC 1) and labs sent. Unclear when pt had his last drink. EtOH was undetectable c/f possible alcohol withdrawal or delirium tremens. He has required multiple doses of mostly benzodiazepines to keep his agitation under control.                 He was found to have an EKG with hyperacute T-waves in anteroseptal leads and troponin 1.7 that trended up to 4.7. Pt was given rectal aspirin, started on heparin drip, and transferred for further evaluation and workup. Utox positive for cocaine, THC. Pt had rectal bleeding from an internal hemorrhoid after several medications were given and temps were taken rectally. OSH  did not have cath lab. Pt was directly admitted to [facility] for higher level of care and cardiology evaluation. On arrival, troponin trended up to 5.8 and EKG revealed STEMI.       Hospital Course: 7/10/23: ok for step down to hospital medicine      Interval History: ok to step down to hospital medicine with cards following, phenobarbital taper.    Review of Systems   Constitutional: Negative.    HENT: Negative.     Eyes: Negative.    Respiratory: Negative.     Cardiovascular: Negative.    Gastrointestinal: Negative.    Endocrine: Negative.    Genitourinary: Negative.    Musculoskeletal: Negative.    Skin: Negative.    Neurological: Negative.        Vitals:  Temp: 97.8 °F (36.6 °C)  Pulse: 82  Rhythm: sinus arrhythmia  BP: (!) 142/106  MAP (mmHg): 120  Resp: 19  SpO2: 99 %    Temp  Min: 96.9 °F (36.1 °C)  Max: 99.7 °F (37.6 °C)  Pulse  Min: 64  Max: 96  BP  Min: 101/60  Max: 142/106  MAP (mmHg)  Min: 75  Max: 120  Resp  Min: 12  Max: 36  SpO2  Min: 95 %  Max: 100 %    07/09 0701 - 07/10 0700  In: 2292.6 [P.O.:50; I.V.:125.8]  Out: 900 [Urine:900]   Unmeasured Output  Urine Occurrence: 1  Stool Occurrence: 1        Physical Exam  Vitals and nursing note reviewed.   Constitutional:       Appearance: Normal appearance.   HENT:      Head: Normocephalic.      Nose: Nose normal.      Mouth/Throat:      Mouth: Mucous membranes are moist.      Pharynx: Oropharynx is clear.   Eyes:      Pupils: Pupils are equal, round, and reactive to light.   Cardiovascular:      Rate and Rhythm: Normal rate and regular rhythm.      Pulses: Normal pulses.      Heart sounds: Normal heart sounds.   Pulmonary:      Effort: Pulmonary effort is normal.      Breath sounds: Normal breath sounds.   Abdominal:      General: Bowel sounds are normal.      Palpations: Abdomen is soft.   Musculoskeletal:         General: Normal range of motion.   Skin:     General: Skin is warm and dry.      Capillary Refill: Capillary refill takes 2 to 3 seconds.    Neurological:      Mental Status: He is alert.      Comments: GCS 15  AAO X3  PERRL  YA  to command and spontaneously   Sensation Intact X4          Medications:  Continuousheparin (porcine) in D5W, Last Rate: 11 Units/kg/hr (07/10/23 1302)    Scheduledacyclovir, 10 mg/kg, Q8H  aspirin, 81 mg, Daily  clopidogreL, 75 mg, Daily  folic acid, 1 mg, Daily  heparin (PORCINE), 60 Units/kg (Adjusted), Once  mupirocin, , BID  PHENobarbitaL, 30 mg, BID   Followed by  [START ON 7/12/2023] PHENobarbitaL, 15 mg, BID   Followed by  [START ON 7/14/2023] PHENobarbitaL, 8 mg, BID   Followed by  [START ON 7/15/2023] PHENobarbitaL, 4 mg, BID  polyethylene glycol, 17 g, Daily  senna-docusate 8.6-50 mg, 1 tablet, BID  thiamine, 100 mg, Daily    PRNacetaminophen, 1,000 mg, Q8H PRN  acetaminophen, 650 mg, Q4H PRN  heparin (PORCINE), 30 Units/kg (Adjusted), PRN  heparin (PORCINE), 60 Units/kg (Adjusted), PRN  magnesium oxide, 800 mg, PRN  magnesium oxide, 800 mg, PRN  ondansetron, 4 mg, Q8H PRN  potassium bicarbonate, 35 mEq, PRN  potassium bicarbonate, 50 mEq, PRN  potassium bicarbonate, 60 mEq, PRN  potassium, sodium phosphates, 2 packet, PRN  potassium, sodium phosphates, 2 packet, PRN  potassium, sodium phosphates, 2 packet, PRN  prochlorperazine, 5 mg, Q6H PRN  sodium chloride 0.9%, 10 mL, PRN      Today I personally reviewed pertinent medications, lines/drains/airways, imaging, cardiology results, laboratory results, microbiology results, notably:    Diet  [facility]; Standard Tray  [facility]; Standard Tray          Assessment/Plan:     Neuro  * Seizure  62yo M w PMHx of HTN, CAD, EtOH use, and seizures on keppra presenting to OSH ED via EMS for evaluation of a seizure after being found post-ictal on his couch. Breakthrough seizures q1-2 months recently. Lactic and CK elevated.    Pt was found to be septic. He received rectal acetaminophen. Started on vanc, zosyn, acyclovir, and rocephin.  Fluid resuscitated 2L LR.   LP was performed (glucose 82, protein 42, WBC 1) and labs sent. Add on labs ordered  Unclear when pt had his last drink. EtOH was WNL c/f possible alcohol withdrawal or delirium tremens.             OSH CTH negative for acute findings      Admit to NCC   q1h neuro checks and vitals   CBC, CMP, mag, phos daily    TSH, A1c, lipid panel, coags, Utox, EtOH   HSV PCR   Echo, EKG, CXR   vEEG  o Appreciate epilepsy recs    Keppra loaded w 2.5g     Keppra 1000 BID    F/u LP labs  o Add on ordered: Meningitis encephalitis panel  o Add on ordered: [place] send out- CSFME   Vanc, cefepime, ampicillin, acyclovir    SCD; hold chemical VTE ppx in acute period    PT/OT/SLP   Avoid agents that reduce seizure threshold    Seizure precautions   Patient has alcohol withdrawal seizures that does not require AED's     Psychiatric  Alcohol abuse  Unclear when pt had his last drink. EtOH was undetectable c/f possible alcohol withdrawal or delirium tremens. He has required multiple doses of mostly benzodiazepines to keep his agitation under control.     · CIWA protocol   · PETH, ethanol    · LFTs   · Thiamine   · Folate  · Prn valium     Cardiac/Vascular  STEMI (ST elevation myocardial infarction)  OSH EKG noted to have hyperacute T waves in anteroseptal leads and troponin trending up from 1.7 to 4.7  Received rectal ASA and started on heparin gtt   Utox positive for cocaine.   On arrival, troponin trended up to 5.8 and EKG revealed STEMI    · Continue low intensity heparin gtt   · Trending troponin   · O2   · Consulted interventional cardiology, recs:   · Not a candidate for cath lab due to sepsis and out of window   · loaded with ASA rectally prior to arrival here  · Plavix load 600 via NG tube  · Cont hep gtt  · Bedside echo w EF 20%, apical ballooning and normal wall motion at the bases concerning for Tok[place]subo pattern  · Start ASA 81 qd and plavix 75 qd  · Atorvastatin 80  · Formal echo    · F/up with cardiology post discharge   · General cardiology consult for further management   · Cards following    Elevated troponin  See stemi  Cards following    HTN (hypertension)  SBP <160    Other  Tobacco use disorder  Nicotine patch prn           The patient is being Prophylaxed for:  Venous Thromboembolism with: Mechanical or Chemical  Stress Ulcer with: Not Applicable   Ventilator Pneumonia with: not applicable    Activity Orders          Diet Cardiac [facility]; Standard Tray: Cardiac starting at 07/10 1137    Progressive Mobility Protocol (mobilize patient to their highest level of functioning at least twice daily) starting at 07/08 2000        Full Code  Level 3  Tanna Valera NP  Neurocritical Care  [facility] - Neuro Critical Care

## 2023-07-10 NOTE — ASSESSMENT & PLAN NOTE
Unclear when pt had his last drink. EtOH was undetectable c/f possible alcohol withdrawal or delirium tremens. He has required multiple doses of mostly benzodiazepines to keep his agitation under control.       CIWA protocol    PETH, ethanol     LFTs    Thiamine    Folate   Prn valium

## 2023-07-10 NOTE — PLAN OF CARE
[facility] Care Plan    POC reviewed with Paty Jiménez and family at 0300. Pt irritable and has short term memory loss. Questions and concerns addressed. No acute events overnight. Pt progressing toward goals. Will continue to monitor. See below and flowsheets for full assessment and VS info.    -heparin gtt no longer therapeutic, gtt increased by 2 but no bolus per MD order   -NG pulled out  -bedside swallow performed and patient passed but will need close supervision as pt is uncooperative and impulsive  -pt is a flight risk        Is this a stroke patient? no    Neuro:  [place] Coma Scale  Best Eye Response: 3-->(E3) to speech  Best Motor Response: 6-->(M6) obeys commands  Best Verbal Response: 4-->(V4) confused  [place] Coma Scale Score: 13  Assessment Qualifiers: patient not sedated/intubated  Pupil PERRLA: yes     24hr Temp:  [96.9 °F (36.1 °C)-99.7 °F (37.6 °C)]     CV:   Rhythm: sinus arrhythmia  BP goals:   SBP < 160  MAP > 65    Resp:           Plan: N/A    GI/:     Diet/Nutrition Received: NPO  Last Bowel Movement: 07/09/23  Voiding Characteristics: external catheter    Intake/Output Summary (Last 24 hours) at 7/10/2023 0705  Last data filed at 7/10/2023 0601  Gross per 24 hour   Intake 2140.4 ml   Output 900 ml   Net 1240.4 ml     Unmeasured Output  Stool Occurrence: 1    Labs/Accuchecks:  Recent Labs   Lab 07/10/23  0423   WBC 5.51  5.51   RBC 3.90*  3.90*   HGB 12.2*  12.2*   HCT 35.3*  35.3*   PLT 93*  93*      Recent Labs   Lab 07/10/23  0423   *   K 3.9   CO2 23      BUN 7*   CREATININE 0.8   ALKPHOS 90   *   *   BILITOT 1.3*      Recent Labs   Lab 07/09/23  0006 07/09/23  0406 07/10/23  0423   INR 1.4*  1.4*  --   --    APTT 79.1*  79.1*   < > 37.6*    < > = values in this interval not displayed.      Recent Labs   Lab 07/08/23  2143 07/09/23  0406 07/10/23  0423   *  --   --    TROPONINI 5.874*   < > 4.424*    < > = values in this interval not displayed.        Electrolytes: Electrolytes replaced  Accuchecks: none    Gtts:   heparin (porcine) in D5W 11 Units/kg/hr (07/10/23 0649)       LDA/Wounds:  Lines/Drains/Airways       Drain  Duration                  NG/OG Tube 07/09/23 0000 14 Fr. Right nostril 1 day    Male External Urinary Catheter 07/08/23 2250 1 day              Peripheral Intravenous Line  Duration                  Peripheral IV - Single Lumen 07/08/23 1659 18 G Anterior;Right Upper Arm 1 day         Peripheral IV - Single Lumen 07/08/23 1800 20 G Anterior;Distal;Left Upper Arm 1 day         Peripheral IV - Single Lumen 07/09/23 1530 20 G Right;Posterior Hand <1 day                  Wounds: No  Wound care consulted: No

## 2023-07-10 NOTE — PROGRESS NOTES
Therapy with vancomycin complete and/or consult discontinued by provider.  Pharmacy will sign off, please re-consult as needed.    Katy Serrano, PharmD, BCCCP  Neurocritical Care Clinical Pharmacist  Ext. [phone]

## 2023-07-11 PROBLEM — F10.10 ALCOHOL ABUSE: Chronic | Status: ACTIVE | Noted: 2022-07-15

## 2023-07-11 PROBLEM — F17.200 TOBACCO USE DISORDER: Chronic | Status: ACTIVE | Noted: 2022-07-15

## 2023-07-11 PROBLEM — D63.8 ANEMIA, CHRONIC DISEASE: Chronic | Status: ACTIVE | Noted: 2022-07-15

## 2023-07-11 PROBLEM — R56.9 SEIZURE: Chronic | Status: ACTIVE | Noted: 2023-01-22

## 2023-07-11 PROBLEM — I10 HTN (HYPERTENSION): Chronic | Status: ACTIVE | Noted: 2023-01-23

## 2023-07-11 LAB
ALBUMIN SERPL BCP-MCNC: 2.7 G/DL (ref 3.5–5.2)
ALP SERPL-CCNC: 99 U/L (ref 55–135)
ALT SERPL W/O P-5'-P-CCNC: 111 U/L (ref 10–44)
ANION GAP SERPL CALC-SCNC: 9 MMOL/L (ref 8–16)
APTT PPP: 33.7 SEC (ref 21–32)
AST SERPL-CCNC: 203 U/L (ref 10–40)
BACTERIA CSF CULT: NO GROWTH
BASOPHILS # BLD AUTO: 0.03 K/UL (ref 0–0.2)
BASOPHILS NFR BLD: 0.6 % (ref 0–1.9)
BILIRUB SERPL-MCNC: 1 MG/DL (ref 0.1–1)
BUN SERPL-MCNC: 8 MG/DL (ref 8–23)
CALCIUM SERPL-MCNC: 8.3 MG/DL (ref 8.7–10.5)
CHLORIDE SERPL-SCNC: 99 MMOL/L (ref 95–110)
CO2 SERPL-SCNC: 26 MMOL/L (ref 23–29)
CREAT SERPL-MCNC: 0.7 MG/DL (ref 0.5–1.4)
DIFFERENTIAL METHOD: ABNORMAL
EOSINOPHIL # BLD AUTO: 0.2 K/UL (ref 0–0.5)
EOSINOPHIL NFR BLD: 3.3 % (ref 0–8)
ERYTHROCYTE [DISTWIDTH] IN BLOOD BY AUTOMATED COUNT: 14.2 % (ref 11.5–14.5)
EST. GFR  (NO RACE VARIABLE): >60 ML/MIN/1.73 M^2
GLUCOSE SERPL-MCNC: 102 MG/DL (ref 70–110)
GRAM STN SPEC: NORMAL
HCT VFR BLD AUTO: 29.9 % (ref 40–54)
HGB BLD-MCNC: 10.2 G/DL (ref 14–18)
HSV-1 DNA BY PCR: NEGATIVE
HSV-2 DNA BY PCR: NEGATIVE
HSV1, PCR, CSF: NEGATIVE
HSV2, PCR, CSF: NEGATIVE
IMM GRANULOCYTES # BLD AUTO: 0.01 K/UL (ref 0–0.04)
IMM GRANULOCYTES NFR BLD AUTO: 0.2 % (ref 0–0.5)
LYMPHOCYTES # BLD AUTO: 1.5 K/UL (ref 1–4.8)
LYMPHOCYTES NFR BLD: 28.9 % (ref 18–48)
MAGNESIUM SERPL-MCNC: 1.6 MG/DL (ref 1.6–2.6)
MCH RBC QN AUTO: 30.7 PG (ref 27–31)
MCHC RBC AUTO-ENTMCNC: 34.1 G/DL (ref 32–36)
MCV RBC AUTO: 90 FL (ref 82–98)
MONOCYTES # BLD AUTO: 0.6 K/UL (ref 0.3–1)
MONOCYTES NFR BLD: 11.1 % (ref 4–15)
NEUTROPHILS # BLD AUTO: 2.9 K/UL (ref 1.8–7.7)
NEUTROPHILS NFR BLD: 55.9 % (ref 38–73)
NRBC BLD-RTO: 0 /100 WBC
PHOSPHATE SERPL-MCNC: 1.4 MG/DL (ref 2.7–4.5)
PLATELET # BLD AUTO: 84 K/UL (ref 150–450)
PMV BLD AUTO: 10.7 FL (ref 9.2–12.9)
POTASSIUM SERPL-SCNC: 3.6 MMOL/L (ref 3.5–5.1)
PROT SERPL-MCNC: 6.2 G/DL (ref 6–8.4)
RBC # BLD AUTO: 3.32 M/UL (ref 4.6–6.2)
SODIUM SERPL-SCNC: 134 MMOL/L (ref 136–145)
WBC # BLD AUTO: 5.23 K/UL (ref 3.9–12.7)

## 2023-07-11 PROCEDURE — 80053 COMPREHEN METABOLIC PANEL: CPT

## 2023-07-11 PROCEDURE — 94761 N-INVAS EAR/PLS OXIMETRY MLT: CPT

## 2023-07-11 PROCEDURE — S0166 INJ OLANZAPINE 2.5MG: HCPCS | Performed by: NURSE PRACTITIONER

## 2023-07-11 PROCEDURE — 25000003 PHARM REV CODE 250: Performed by: NURSE PRACTITIONER

## 2023-07-11 PROCEDURE — 25000003 PHARM REV CODE 250: Performed by: PSYCHIATRY & NEUROLOGY

## 2023-07-11 PROCEDURE — 63600175 PHARM REV CODE 636 W HCPCS: Performed by: PSYCHIATRY & NEUROLOGY

## 2023-07-11 PROCEDURE — 85025 COMPLETE CBC W/AUTO DIFF WBC: CPT

## 2023-07-11 PROCEDURE — 85730 THROMBOPLASTIN TIME PARTIAL: CPT | Performed by: PSYCHIATRY & NEUROLOGY

## 2023-07-11 PROCEDURE — 83735 ASSAY OF MAGNESIUM: CPT

## 2023-07-11 PROCEDURE — 99233 SBSQ HOSP IP/OBS HIGH 50: CPT | Mod: FS,,, | Performed by: NURSE PRACTITIONER

## 2023-07-11 PROCEDURE — 99222 1ST HOSP IP/OBS MODERATE 55: CPT | Mod: ,,, | Performed by: PSYCHIATRY & NEUROLOGY

## 2023-07-11 PROCEDURE — 99233 PR SUBSEQUENT HOSPITAL CARE,LEVL III: ICD-10-PCS | Mod: FS,,, | Performed by: NURSE PRACTITIONER

## 2023-07-11 PROCEDURE — 84100 ASSAY OF PHOSPHORUS: CPT

## 2023-07-11 PROCEDURE — 99222 PR INITIAL HOSPITAL CARE,LEVL II: ICD-10-PCS | Mod: ,,, | Performed by: PSYCHIATRY & NEUROLOGY

## 2023-07-11 PROCEDURE — 11000001 HC ACUTE MED/SURG PRIVATE ROOM

## 2023-07-11 RX ORDER — OLANZAPINE 10 MG/2ML
5 INJECTION, POWDER, FOR SOLUTION INTRAMUSCULAR ONCE AS NEEDED
Status: DISCONTINUED | OUTPATIENT
Start: 2023-07-11 | End: 2023-07-11

## 2023-07-11 RX ORDER — OLANZAPINE 10 MG/2ML
10 INJECTION, POWDER, FOR SOLUTION INTRAMUSCULAR ONCE AS NEEDED
Status: DISCONTINUED | OUTPATIENT
Start: 2023-07-11 | End: 2023-07-11

## 2023-07-11 RX ORDER — OLANZAPINE 10 MG/2ML
5 INJECTION, POWDER, FOR SOLUTION INTRAMUSCULAR 2 TIMES DAILY PRN
Status: DISCONTINUED | OUTPATIENT
Start: 2023-07-11 | End: 2023-07-12

## 2023-07-11 RX ORDER — DIAZEPAM 5 MG/1
5 TABLET ORAL EVERY 8 HOURS PRN
Status: DISCONTINUED | OUTPATIENT
Start: 2023-07-11 | End: 2023-07-12

## 2023-07-11 RX ADMIN — PHENOBARBITAL 30 MG: 30 TABLET ORAL at 08:07

## 2023-07-11 RX ADMIN — SACUBITRIL AND VALSARTAN 1 TABLET: 24; 26 TABLET, FILM COATED ORAL at 08:07

## 2023-07-11 RX ADMIN — ACYCLOVIR SODIUM 600 MG: 1000 INJECTION, SOLUTION INTRAVENOUS at 01:07

## 2023-07-11 RX ADMIN — OLANZAPINE 5 MG: 10 INJECTION, POWDER, FOR SOLUTION INTRAMUSCULAR at 04:07

## 2023-07-11 RX ADMIN — DIAZEPAM 5 MG: 5 TABLET ORAL at 04:07

## 2023-07-11 NOTE — SUBJECTIVE & OBJECTIVE
Interval History: see above    Review of Systems   Constitutional:  Positive for activity change. Negative for appetite change and fever.   HENT:  Negative for trouble swallowing.    Respiratory:  Negative for cough and shortness of breath.    Cardiovascular:  Negative for chest pain and leg swelling.   Gastrointestinal:  Negative for abdominal pain, diarrhea, nausea and vomiting.   Genitourinary:  Negative for difficulty urinating.   Musculoskeletal:  Negative for arthralgias, back pain, gait problem and neck stiffness.   Psychiatric/Behavioral:  Negative for behavioral problems.    Objective:     Vital Signs (Most Recent):  Temp: 98.3 °F (36.8 °C) (07/12/23 1119)  Pulse: 93 (07/12/23 1119)  Resp: 18 (07/12/23 1119)  BP: (!) 141/88 (07/12/23 1119)  SpO2: 99 % (07/12/23 1119) Vital Signs (24h Range):  Temp:  [97.6 °F (36.4 °C)-98.5 °F (36.9 °C)] 98.3 °F (36.8 °C)  Pulse:  [] 93  Resp:  [12-24] 18  SpO2:  [94 %-100 %] 99 %  BP: (120-160)/(60-99) 141/88     Weight: 59.9 kg (132 lb)  Body mass index is 20.07 kg/m².  No intake or output data in the 24 hours ending 07/12/23 1319        Physical Exam  Constitutional:       General: He is not in acute distress.     Appearance: Normal appearance. He is not ill-appearing, toxic-appearing or diaphoretic.      Comments: Tall and thin   HENT:      Head: Normocephalic and atraumatic.      Nose: Nose normal.      Mouth/Throat:      Mouth: Mucous membranes are moist.      Pharynx: Oropharynx is clear.   Eyes:      General: No scleral icterus.     Extraocular Movements: Extraocular movements intact.      Conjunctiva/sclera: Conjunctivae normal.      Pupils: Pupils are equal, round, and reactive to light.   Cardiovascular:      Rate and Rhythm: Normal rate and regular rhythm.      Pulses: Normal pulses.      Heart sounds: Normal heart sounds.   Pulmonary:      Effort: Pulmonary effort is normal. No respiratory distress.      Breath sounds: Normal breath sounds. No stridor. No  wheezing, rhonchi or rales.   Chest:      Chest wall: No tenderness.   Abdominal:      General: Abdomen is flat. Bowel sounds are normal. There is no distension.      Palpations: Abdomen is soft.      Tenderness: There is no abdominal tenderness. There is no right CVA tenderness, left CVA tenderness, guarding or rebound.   Musculoskeletal:         General: No swelling, tenderness, deformity or signs of injury. Normal range of motion.      Cervical back: Normal range of motion and neck supple. No rigidity or tenderness.      Right lower leg: No edema.      Left lower leg: No edema.   Skin:     General: Skin is warm and dry.      Coloration: Skin is not jaundiced.      Findings: No erythema or rash.   Neurological:      General: No focal deficit present.      Mental Status: He is alert and oriented to person, place, and time. Mental status is at baseline.      Motor: No weakness.      Gait: Gait normal.   Psychiatric:         Mood and Affect: Mood normal.         Behavior: Behavior normal.         Thought Content: Thought content normal.      Comments: Slow thinking, smiling, coperative           Significant Labs: All pertinent labs within the past 24 hours have been reviewed.  CBC:   Recent Labs   Lab 07/11/23 0133 07/12/23  0529   WBC 5.23 6.63   HGB 10.2* 10.2*   HCT 29.9* 29.7*   PLT 84* 114*     CMP:   Recent Labs   Lab 07/11/23 0133 07/12/23  0529   * 136   K 3.6 3.4*   CL 99 100   CO2 26 23    117*   BUN 8 7*   CREATININE 0.7 0.8   CALCIUM 8.3* 8.7   PROT 6.2 6.9   ALBUMIN 2.7* 3.1*   BILITOT 1.0 1.0   ALKPHOS 99 96   * 145*   * 96*   ANIONGAP 9 13       Significant Imaging: I have reviewed all pertinent imaging results/findings within the past 24 hours.

## 2023-07-11 NOTE — PLAN OF CARE
[facility] Care Plan    POC reviewed with Paty Jiménez and family at 1400. Pt verbalized understanding. Questions and concerns addressed. No acute events today. Pt progressing toward goals. Will continue to monitor. See below and flowsheets for full assessment and VS info.     PEC  Removed all PIV  Refused all meds  Refused vitals  Intermittently redirectable  Zyprexa given once        Is this a stroke patient? no    Neuro:  [place] Coma Scale  Best Eye Response: 4-->(E4) spontaneous  Best Motor Response: 6-->(M6) obeys commands  Best Verbal Response: 4-->(V4) confused  [place] Coma Scale Score: 14  Assessment Qualifiers: patient not sedated/intubated, no eye obstruction present  Pupil PERRLA: yes     24 hr Temp:  [98 °F (36.7 °C)-98.8 °F (37.1 °C)]     CV:   Rhythm: sinus arrhythmia  BP goals:   SBP < 160  MAP > 65    Resp:           Plan: N/A    GI/:     Diet/Nutrition Received: low saturated fat/low cholesterol  Last Bowel Movement: 07/11/23  Voiding Characteristics: voids spontaneously without difficulty    Intake/Output Summary (Last 24 hours) at 7/11/2023 1806  Last data filed at 7/11/2023 0507  Gross per 24 hour   Intake 117.14 ml   Output 550 ml   Net -432.86 ml     Unmeasured Output  Urine Occurrence: 1  Stool Occurrence: 1    Labs/Accuchecks:  Recent Labs   Lab 07/11/23 0133   WBC 5.23   RBC 3.32*   HGB 10.2*   HCT 29.9*   PLT 84*      Recent Labs   Lab 07/11/23 0133   *   K 3.6   CO2 26   CL 99   BUN 8   CREATININE 0.7   ALKPHOS 99   *   *   BILITOT 1.0      Recent Labs   Lab 07/09/23  0006 07/09/23  0406 07/11/23  0133   INR 1.4*  1.4*  --   --    APTT 79.1*  79.1*   < > 33.7*    < > = values in this interval not displayed.      Recent Labs   Lab 07/08/23  2143 07/09/23  0406 07/10/23  1258   *  --   --    TROPONINI 5.874*   < > 2.989*    < > = values in this interval not displayed.       Electrolytes: N/A - electrolytes WDL  Accuchecks:  none    Gtts:      LDA/Wounds:  Lines/Drains/Airways       None                 Wounds: Yes  Wound care consulted: Yes

## 2023-07-11 NOTE — PLAN OF CARE
62 y/o M with epilepsy, HTN, and alcohol/cocaine abuse presenting after being found down 2/2 presumed seizure. Septic on admission but abx discontinued due to thought that symptoms are likely due to alcohol withdrawal. Cardiology consulted for NSTEMI.     TTE with new drop in EF 40% and segmental LV WMA suggestive of Takotsubo cardiomyopathy, but LAD distribution ischemic heart disease cannot be ruled out. Discussed with IC and patient to be managed medically.      - Discontinue ACS protocol after 48 hours  - Repeat TTE in 1-2 weeks  - Patient will need GDMT upon discharge including SGLT2i and Entresto  - Can re-consult if in need of assistance in further management.      Thank you for your consult. Cardiology will sign off.      Reynaldo Covarrubias MD PGY2  Cardiology Consults

## 2023-07-11 NOTE — NURSING
"Patient ripped out peripheral IV despite having a sitter in the room. Patient has blood all over pants, shirts, bed sheets, and floor. He doesn't believe that it is blood. It is his "daiquiri" on his clothes. He states that he is leaving this place, although he does not recall what "this place" is. Charge notified  "

## 2023-07-11 NOTE — NURSING
Patient refuses to have new PIVs placed and refuses to allow RN to take vitals. Patient sitting in chair with chair alarm set and audible. Patient is confused and doesn't believe that he is in a hospital. Team notified and aware.

## 2023-07-11 NOTE — ASSESSMENT & PLAN NOTE
Unclear when pt had his last drink. EtOH was undetectable c/f possible alcohol withdrawal or delirium tremens. He has required multiple doses of mostly benzodiazepines to keep his agitation under control.   · CIWA protocol   · PETH, ethanol    · LFTs   · Thiamine   · Folate  · Prn valium   7/11/2023 Dc heparin, q4h neuro checks, zyprexa bid 5 mg, PECed, Psyc consulted

## 2023-07-11 NOTE — ASSESSMENT & PLAN NOTE
62yo M w PMHx of HTN, CAD, EtOH use, and seizures on keppra presenting to OSH ED via EMS for evaluation of a seizure after being found post-ictal on his couch. Breakthrough seizures q1-2 months recently. Lactic and CK elevated.    Pt was found to be septic. He received rectal acetaminophen. Started on vanc, zosyn, acyclovir, and rocephin. Fluid resuscitated 2L LR.   LP was performed (glucose 82, protein 42, WBC 1) and labs sent. Add on labs ordered  Unclear when pt had his last drink. EtOH was WNL c/f possible alcohol withdrawal or delirium tremens.             OSH CTH negative for acute findings      Admit to NCC   q1h neuro checks and vitals   CBC, CMP, mag, phos daily    TSH, A1c, lipid panel, coags, Utox, EtOH   HSV PCR   Echo, EKG, CXR   vEEG  o Appreciate epilepsy recs    Keppra loaded w 2.5g     Keppra 1000 BID    F/u LP labs  o Add on ordered: Meningitis encephalitis panel  o Add on ordered: [place] send out- CSFME   Vanc, cefepime, ampicillin, acyclovir    SCD; hold chemical VTE ppx in acute period    PT/OT/SLP   Avoid agents that reduce seizure threshold    Seizure precautions   Patient has alcohol withdrawal seizures that does not require AED's

## 2023-07-11 NOTE — SIGNIFICANT EVENT
Called to bedside, patient ripped out his PIVs (again), attempting to leave. Assessed patient, patient oriented to self only, not oriented to place or situation, intermittently stating that he is at a friend's garage for a party vs in prison. Not able to express that he has been hospitalized for a heart attack despite multiple attempts to reorient patient. Thought content tangential, perseverative, ? Visual hallucinations (intermittently stating that multiple people are in room for his friend's party). Suspect worsening mental status in setting of active EtOH w/d, pt is currently on phenobarb taper for known severe EtOH abuse.     At this time, patient does not have capacity to make medical decisions. Pt gravely disabled, significant risk to self given inability to understand current medical condition. PEC form filled out at bedside, pt put back in bed w/ assistance of security.  notified, psych consulted.       Brittney Preciado MD, MPH  Neurocritical Care Physician

## 2023-07-11 NOTE — HOSPITAL COURSE
Echocardiogram showed mildly decreased systolic function, segmental left ventricular wall motion abnormalities suggestive of Takotsubo cardiomyopathy, but left anterior descending artery distribution ischemic heart disease could not be ruled out, left ventricular diastolic dysfunction, moderate left atrial enlargement, moderate tricuspid regurgitation, and pulmonary hypertension. On 7/10/2023, he was deemed okay for stepdown to Hospital Medicine. On 7/11/2023, heparin was stopped. He was put on olanzapine 5 mg twice daily. He was placed under PEC. Psychiatry was consulted. He was transferred to Hospital Medicine Team N on 7/12/2023. He was pacing in his room with paranoid delusions. Psychiatry started Depakote and diazepam taper and stopped phenobarbital. Delirium tremens resolved. He was prescribed the STEMI medications and diazepam taper.

## 2023-07-11 NOTE — NURSING
Patient removed PIV x2. Refuses to let RN place another one. Patient states that he is not in a hospital and that he is at a friends house in the garage. MD chauhan. Heparin gtt is paused.

## 2023-07-11 NOTE — SUBJECTIVE & OBJECTIVE
ROS:  Unable to obtain due to confusion      Vitals:  Temp: 98.1 °F (36.7 °C)  Pulse: (!) 112  Rhythm: sinus arrhythmia  BP: (!) 143/81  MAP (mmHg): 104  Resp: (!) 22  SpO2: 96 %    Temp  Min: 98 °F (36.7 °C)  Max: 98.8 °F (37.1 °C)  Pulse  Min: 90  Max: 118  BP  Min: 109/62  Max: 160/78  MAP (mmHg)  Min: 78  Max: 113  Resp  Min: 12  Max: 30  SpO2  Min: 92 %  Max: 99 %    07/10 0701 - 07/11 0700  In: 626.6 [I.V.:77.6]  Out: 550 [Urine:550]   Unmeasured Output  Urine Occurrence: 1  Stool Occurrence: 1        Physical Exam  Vitals and nursing note reviewed.   Constitutional:       Appearance: Normal appearance.   HENT:      Head: Normocephalic.      Nose: Nose normal.      Mouth/Throat:      Mouth: Mucous membranes are moist.      Pharynx: Oropharynx is clear.   Eyes:      Pupils: Pupils are equal, round, and reactive to light.   Cardiovascular:      Rate and Rhythm: Normal rate and regular rhythm.      Pulses: Normal pulses.      Heart sounds: Normal heart sounds.   Pulmonary:      Effort: Pulmonary effort is normal.      Breath sounds: Normal breath sounds.   Abdominal:      General: Bowel sounds are normal.      Palpations: Abdomen is soft.   Musculoskeletal:         General: Normal range of motion.   Skin:     General: Skin is warm and dry.      Capillary Refill: Capillary refill takes 2 to 3 seconds.   Neurological:      Mental Status: He is alert.      Comments: Confused  PERRL  YA  to command and spontaneously   Sensation Intact X4          Medications:  Continuous   Scheduledacyclovir, 10 mg/kg, Q8H  aspirin, 81 mg, Daily  clopidogreL, 75 mg, Daily  folic acid, 1 mg, Daily  mupirocin, , BID  PHENobarbitaL, 30 mg, BID   Followed by  [START ON 7/12/2023] PHENobarbitaL, 15 mg, BID   Followed by  [START ON 7/14/2023] PHENobarbitaL, 8 mg, BID   Followed by  [START ON 7/15/2023] PHENobarbitaL, 4 mg, BID  polyethylene glycol, 17 g, Daily  sacubitriL-valsartan, 1 tablet, BID  senna-docusate 8.6-50 mg, 1 tablet,  BID  thiamine, 100 mg, Daily    PRNacetaminophen, 1,000 mg, Q8H PRN  acetaminophen, 650 mg, Q4H PRN  diazePAM, 5 mg, Q8H PRN  magnesium oxide, 800 mg, PRN  magnesium oxide, 800 mg, PRN  OLANZapine, 5 mg, BID PRN  ondansetron, 4 mg, Q8H PRN  potassium bicarbonate, 35 mEq, PRN  potassium bicarbonate, 50 mEq, PRN  potassium bicarbonate, 60 mEq, PRN  potassium, sodium phosphates, 2 packet, PRN  potassium, sodium phosphates, 2 packet, PRN  potassium, sodium phosphates, 2 packet, PRN  prochlorperazine, 5 mg, Q6H PRN  sodium chloride 0.9%, 10 mL, PRN      Today I personally reviewed pertinent medications, lines/drains/airways, imaging, cardiology results, laboratory results, microbiology results, notably:    Diet  Diet [facility]; Standard Tray  Diet [facility]; Standard Tray

## 2023-07-11 NOTE — PLAN OF CARE
BRIEF NEUROCRITICAL CARE PROGRESS NOTE    Briefly, Mr. Jiménez is a 62 y/o gentleman w/ PMHx polysubstance abuse, EtOH abuse, tobacco abuse, HTN, CAD, questionable seizure d/o presenting w/ concern for NCSE after being found wandering in his friend's garage, altered, temp 103.5. Pt found to have an anterior wall STEMI at time of presentation, outside of window for interventions, started on DAPT/statin/heparin gtt per ACS protocol.    Patient admitted overnight, noted to be markedly agitated, requiring precedex gtt and valium PRN, currently in 4 pt restraints on exam, attempting to climb out of bed despite multiple attempts at redirecting patient. Is oriented to self, month and year, not oriented to place or situation. No clear focal deficits, EEG cap overnight w/o epileptiform discharges, no evidence of NCSE, formal EEG pending. Per review of record no clear evidence that patient has ever had a seizure outside of the setting of EtOH w/d, therefore would not benefit from long term AED therapy. Additionally concerned that keppra may be contributing to patient agitation, will d/c. Start phenobarb taper for management of complicated EtOH w/d. LP non-infectious appearing, will continue w/ broad spectrum abx and acyclovir pending cx results, however of note low clinical concern for meningitis at this time. Lactate normalized, no leukocytosis, no further fevers since patient removed from garage. Suspect patient likely w/ hyperthermia/heat stroke in setting of EtOH w/d, which unfortunately does appear to have triggered an MI (+/- component of cocaine use contributing to MI, utox positive for both cocaine and THC). Formal TTE this AM w/ EF 40%, segmental LV wall motion abnormalities which correlate w/ ST changes seen on ECG, likely ischemic in etiology. Lower clinical suspicion for Takotsubo's given no clear trigger (notably no neurogenic cause identified), ECG changes and markedly elevated trop more consistent w/ ischemic  CAD. Does remain on heparin gtt and DAPT per cardiology recs, trop now downtrending, appreciate ongoing cardiology assistance. Labs w/ borderline low Na at 134, platelets 96->108, LFTs mildly elevated to 140/55, all consistent w/ reported hx of severe EtOH abuse, appears close to patient baseline.     Will continue to monitor in ICU pending formal EEG results, ability to wean pt off of precedex gtt. Will need alcohol/substance abuse cessation counseling when appropriate from mental status standpoint.     Uninterrupted Critical Care/Counseling Time (not including procedures): 45 minutes  This patient is critically ill due to STEMI, acute systolic heart failure, EtOH abuse, complicated EtOH w/d, hyperthermia, acute encephalopathy, hyponatremia, thrombocytopenia, transaminitis. There is high probability for acute neurological change leading to clinical and possibly life-threatening deterioration requiring highest level of physician preparedness for urgent intervention. I spent greater than 50% of the documented critical care time assessing and making medical decisions for this patient.     Brittney Preciado MD, MPH  Neurocritical Care Physician

## 2023-07-12 PROBLEM — F19.90 SUBSTANCE USE DISORDER: Status: ACTIVE | Noted: 2023-07-12

## 2023-07-12 LAB
ALBUMIN SERPL BCP-MCNC: 3.1 G/DL (ref 3.5–5.2)
ALP SERPL-CCNC: 96 U/L (ref 55–135)
ALT SERPL W/O P-5'-P-CCNC: 96 U/L (ref 10–44)
ANION GAP SERPL CALC-SCNC: 13 MMOL/L (ref 8–16)
AST SERPL-CCNC: 145 U/L (ref 10–40)
BACTERIA BLD CULT: ABNORMAL
BACTERIA BLD CULT: NORMAL
BASOPHILS # BLD AUTO: 0.02 K/UL (ref 0–0.2)
BASOPHILS NFR BLD: 0.3 % (ref 0–1.9)
BILIRUB SERPL-MCNC: 1 MG/DL (ref 0.1–1)
BUN SERPL-MCNC: 7 MG/DL (ref 8–23)
CALCIUM SERPL-MCNC: 8.7 MG/DL (ref 8.7–10.5)
CHLORIDE SERPL-SCNC: 100 MMOL/L (ref 95–110)
CLINICAL BIOCHEMIST REVIEW: NORMAL
CO2 SERPL-SCNC: 23 MMOL/L (ref 23–29)
CREAT SERPL-MCNC: 0.8 MG/DL (ref 0.5–1.4)
DIFFERENTIAL METHOD: ABNORMAL
EOSINOPHIL # BLD AUTO: 0 K/UL (ref 0–0.5)
EOSINOPHIL NFR BLD: 0.5 % (ref 0–8)
ERYTHROCYTE [DISTWIDTH] IN BLOOD BY AUTOMATED COUNT: 14.3 % (ref 11.5–14.5)
EST. GFR  (NO RACE VARIABLE): >60 ML/MIN/1.73 M^2
EV RNA SPEC QL NAA+PROBE: NEGATIVE
GLUCOSE SERPL-MCNC: 117 MG/DL (ref 70–110)
HCT VFR BLD AUTO: 29.7 % (ref 40–54)
HGB BLD-MCNC: 10.2 G/DL (ref 14–18)
IMM GRANULOCYTES # BLD AUTO: 0.04 K/UL (ref 0–0.04)
IMM GRANULOCYTES NFR BLD AUTO: 0.6 % (ref 0–0.5)
LYMPHOCYTES # BLD AUTO: 1.6 K/UL (ref 1–4.8)
LYMPHOCYTES NFR BLD: 23.4 % (ref 18–48)
MAGNESIUM SERPL-MCNC: 1.6 MG/DL (ref 1.6–2.6)
MCH RBC QN AUTO: 30.7 PG (ref 27–31)
MCHC RBC AUTO-ENTMCNC: 34.3 G/DL (ref 32–36)
MCV RBC AUTO: 90 FL (ref 82–98)
MONOCYTES # BLD AUTO: 1 K/UL (ref 0.3–1)
MONOCYTES NFR BLD: 15.2 % (ref 4–15)
NEUTROPHILS # BLD AUTO: 4 K/UL (ref 1.8–7.7)
NEUTROPHILS NFR BLD: 60 % (ref 38–73)
NRBC BLD-RTO: 0 /100 WBC
PHOSPHATE SERPL-MCNC: 1.9 MG/DL (ref 2.7–4.5)
PLATELET # BLD AUTO: 114 K/UL (ref 150–450)
PLPETH BLD-MCNC: 607 NG/ML
PMV BLD AUTO: 12.1 FL (ref 9.2–12.9)
POPETH BLD-MCNC: 797 NG/ML
POTASSIUM SERPL-SCNC: 3.4 MMOL/L (ref 3.5–5.1)
PROT SERPL-MCNC: 6.9 G/DL (ref 6–8.4)
RBC # BLD AUTO: 3.32 M/UL (ref 4.6–6.2)
SODIUM SERPL-SCNC: 136 MMOL/L (ref 136–145)
SPECIMEN SOURCE: NORMAL
SPECIMEN SOURCE: NORMAL
VARICELLA ZOSTER BY PCR RESULT: NEGATIVE
WBC # BLD AUTO: 6.63 K/UL (ref 3.9–12.7)

## 2023-07-12 PROCEDURE — 11000001 HC ACUTE MED/SURG PRIVATE ROOM

## 2023-07-12 PROCEDURE — 25000003 PHARM REV CODE 250: Performed by: STUDENT IN AN ORGANIZED HEALTH CARE EDUCATION/TRAINING PROGRAM

## 2023-07-12 PROCEDURE — 97161 PT EVAL LOW COMPLEX 20 MIN: CPT

## 2023-07-12 PROCEDURE — 99233 SBSQ HOSP IP/OBS HIGH 50: CPT | Mod: ,,, | Performed by: HOSPITALIST

## 2023-07-12 PROCEDURE — 25000003 PHARM REV CODE 250: Performed by: NURSE PRACTITIONER

## 2023-07-12 PROCEDURE — 25000003 PHARM REV CODE 250

## 2023-07-12 PROCEDURE — 99233 PR SUBSEQUENT HOSPITAL CARE,LEVL III: ICD-10-PCS | Mod: ,,, | Performed by: HOSPITALIST

## 2023-07-12 PROCEDURE — 80053 COMPREHEN METABOLIC PANEL: CPT

## 2023-07-12 PROCEDURE — 97530 THERAPEUTIC ACTIVITIES: CPT

## 2023-07-12 PROCEDURE — 25000003 PHARM REV CODE 250: Performed by: PSYCHIATRY & NEUROLOGY

## 2023-07-12 PROCEDURE — 63600175 PHARM REV CODE 636 W HCPCS: Performed by: PSYCHIATRY & NEUROLOGY

## 2023-07-12 PROCEDURE — 84100 ASSAY OF PHOSPHORUS: CPT

## 2023-07-12 PROCEDURE — 94761 N-INVAS EAR/PLS OXIMETRY MLT: CPT

## 2023-07-12 PROCEDURE — 36415 COLL VENOUS BLD VENIPUNCTURE: CPT

## 2023-07-12 PROCEDURE — 85025 COMPLETE CBC W/AUTO DIFF WBC: CPT

## 2023-07-12 PROCEDURE — 25000003 PHARM REV CODE 250: Performed by: HOSPITALIST

## 2023-07-12 PROCEDURE — 83735 ASSAY OF MAGNESIUM: CPT

## 2023-07-12 RX ORDER — DIAZEPAM 5 MG/1
10 TABLET ORAL EVERY 8 HOURS
Status: DISCONTINUED | OUTPATIENT
Start: 2023-07-12 | End: 2023-07-13

## 2023-07-12 RX ORDER — ACETAMINOPHEN 325 MG/1
650 TABLET ORAL EVERY 4 HOURS PRN
Status: DISCONTINUED | OUTPATIENT
Start: 2023-07-12 | End: 2023-07-14 | Stop reason: HOSPADM

## 2023-07-12 RX ORDER — DAPAGLIFLOZIN 10 MG/1
10 TABLET, FILM COATED ORAL DAILY
Qty: 30 TABLET | Refills: 2 | Status: SHIPPED | OUTPATIENT
Start: 2023-07-12 | End: 2023-10-10

## 2023-07-12 RX ORDER — SODIUM,POTASSIUM PHOSPHATES 280-250MG
2 POWDER IN PACKET (EA) ORAL
Status: DISCONTINUED | OUTPATIENT
Start: 2023-07-12 | End: 2023-07-12

## 2023-07-12 RX ORDER — PHENOBARBITAL 20 MG/5ML
8 ELIXIR ORAL 2 TIMES DAILY
Status: DISCONTINUED | OUTPATIENT
Start: 2023-07-14 | End: 2023-07-12

## 2023-07-12 RX ORDER — ACETAMINOPHEN 500 MG
1000 TABLET ORAL EVERY 8 HOURS PRN
Status: DISCONTINUED | OUTPATIENT
Start: 2023-07-12 | End: 2023-07-14 | Stop reason: HOSPADM

## 2023-07-12 RX ORDER — PHENOBARBITAL 20 MG/5ML
4 ELIXIR ORAL 2 TIMES DAILY
Status: DISCONTINUED | OUTPATIENT
Start: 2023-07-15 | End: 2023-07-12

## 2023-07-12 RX ORDER — DIAZEPAM 5 MG/1
5 TABLET ORAL EVERY 12 HOURS
Status: DISCONTINUED | OUTPATIENT
Start: 2023-07-12 | End: 2023-07-12

## 2023-07-12 RX ORDER — OLANZAPINE 2.5 MG/1
5 TABLET ORAL DAILY
Status: DISCONTINUED | OUTPATIENT
Start: 2023-07-12 | End: 2023-07-12

## 2023-07-12 RX ORDER — NAPROXEN SODIUM 220 MG/1
81 TABLET, FILM COATED ORAL DAILY
Status: DISCONTINUED | OUTPATIENT
Start: 2023-07-13 | End: 2023-07-14 | Stop reason: HOSPADM

## 2023-07-12 RX ORDER — CLOPIDOGREL BISULFATE 75 MG/1
75 TABLET ORAL DAILY
Status: DISCONTINUED | OUTPATIENT
Start: 2023-07-13 | End: 2023-07-14 | Stop reason: HOSPADM

## 2023-07-12 RX ORDER — DIVALPROEX SODIUM 250 MG/1
250 TABLET, DELAYED RELEASE ORAL EVERY 8 HOURS PRN
Status: DISCONTINUED | OUTPATIENT
Start: 2023-07-12 | End: 2023-07-14 | Stop reason: HOSPADM

## 2023-07-12 RX ORDER — OLANZAPINE 2.5 MG/1
5 TABLET ORAL ONCE
Status: COMPLETED | OUTPATIENT
Start: 2023-07-12 | End: 2023-07-12

## 2023-07-12 RX ORDER — PHENOBARBITAL 15 MG/1
15 TABLET ORAL 2 TIMES DAILY
Status: DISCONTINUED | OUTPATIENT
Start: 2023-07-12 | End: 2023-07-12

## 2023-07-12 RX ORDER — SODIUM,POTASSIUM PHOSPHATES 280-250MG
2 POWDER IN PACKET (EA) ORAL
Status: DISCONTINUED | OUTPATIENT
Start: 2023-07-12 | End: 2023-07-14 | Stop reason: HOSPADM

## 2023-07-12 RX ADMIN — PHENOBARBITAL 15 MG: 15 TABLET ORAL at 09:07

## 2023-07-12 RX ADMIN — Medication 100 MG: at 08:07

## 2023-07-12 RX ADMIN — POTASSIUM & SODIUM PHOSPHATES POWDER PACK 280-160-250 MG 2 PACKET: 280-160-250 PACK at 11:07

## 2023-07-12 RX ADMIN — ACYCLOVIR SODIUM 600 MG: 1000 INJECTION, SOLUTION INTRAVENOUS at 08:07

## 2023-07-12 RX ADMIN — DIVALPROEX SODIUM 250 MG: 250 TABLET, DELAYED RELEASE ORAL at 11:07

## 2023-07-12 RX ADMIN — DIAZEPAM 5 MG: 5 TABLET ORAL at 08:07

## 2023-07-12 RX ADMIN — POTASSIUM & SODIUM PHOSPHATES POWDER PACK 280-160-250 MG 2 PACKET: 280-160-250 PACK at 05:07

## 2023-07-12 RX ADMIN — DIAZEPAM 5 MG: 5 TABLET ORAL at 01:07

## 2023-07-12 RX ADMIN — MUPIROCIN: 20 OINTMENT TOPICAL at 09:07

## 2023-07-12 RX ADMIN — ASPIRIN 81 MG 81 MG: 81 TABLET ORAL at 08:07

## 2023-07-12 RX ADMIN — DIAZEPAM 10 MG: 5 TABLET ORAL at 02:07

## 2023-07-12 RX ADMIN — FOLIC ACID 1 MG: 1 TABLET ORAL at 08:07

## 2023-07-12 RX ADMIN — SACUBITRIL AND VALSARTAN 1 TABLET: 24; 26 TABLET, FILM COATED ORAL at 08:07

## 2023-07-12 RX ADMIN — SENNOSIDES AND DOCUSATE SODIUM 1 TABLET: 50; 8.6 TABLET ORAL at 09:07

## 2023-07-12 RX ADMIN — SACUBITRIL AND VALSARTAN 1 TABLET: 24; 26 TABLET, FILM COATED ORAL at 09:07

## 2023-07-12 RX ADMIN — POTASSIUM & SODIUM PHOSPHATES POWDER PACK 280-160-250 MG 2 PACKET: 280-160-250 PACK at 09:07

## 2023-07-12 RX ADMIN — CLOPIDOGREL BISULFATE 75 MG: 75 TABLET ORAL at 08:07

## 2023-07-12 RX ADMIN — DIAZEPAM 10 MG: 5 TABLET ORAL at 09:07

## 2023-07-12 RX ADMIN — OLANZAPINE 5 MG: 2.5 TABLET, FILM COATED ORAL at 03:07

## 2023-07-12 NOTE — PT/OT/SLP EVAL
Occupational Therapy   Co-Evaluation    Name: Paty Jiménez  MRN: [id number]  Admitting Diagnosis: Seizure  Recent Surgery: * No surgery found *      Recommendations:     Discharge Recommendations: other (see comments)  Discharge Equipment Recommendations:  none  Barriers to discharge:  None    Assessment:     Paty Jiménez is a 63 y.o. male with a medical diagnosis of Seizure.  He presents post medical event for OT evaluation. Performance deficits affecting function: gait instability, decreased safety awareness, impaired balance.      Rehab Prognosis: Fair; patient would benefit from acute skilled OT services to address these deficits and reach maximum level of function.       Plan:     Patient to be seen 2 x/week to address the above listed problems via self-care/home management, therapeutic activities, therapeutic exercises  Plan of Care Expires:    Plan of Care Reviewed with: patient    Subjective     Chief Complaint: Patient agreeable  Patient/Family Comments/goals: no family present at time    Occupational Profile:  Living Environment: Patient lives in guest house on niece's property. Pt reporting no stairs in home. Pt does not own any DME at this time but does report sitting on side of bathtub to shower. Prior to medical event pt was independent with ADL tasks. Pt does not drive due to not owning a car but does ride his bike. Patient is retired.   Previous level of function: Independent  Roles and Routines: Pt retired.  Equipment Used at Home: none; pt would benefit from shower chair for safety.   Assistance upon Discharge: Pt may need assistance for balance when getting in and out of tub.     Pain/Comfort:  Pain Rating 1: 0/10    Patients cultural, spiritual, Confucianism conflicts given the current situation: no    Objective:     Communicated with: RN prior to session.  Patient found up in chair with telemetry upon OT entry to room.    General Precautions: Standard, fall  Orthopedic Precautions:    Braces:  N/A  Respiratory Status: Room air    Occupational Performance:    Functional Mobility/Transfers:  Patient completed Sit <> Stand Transfer with contact guard assistance  with  no assistive device   Functional Mobility: Pt ambulated 300 ft in hallway with PT with no LOB.     Activities of Daily Living:  Grooming: stand by assistance while seated EOB  Upper Body Dressing: stand by assistance while seated EOB  Lower Body Dressing: contact guard assistance for safety while seated EOB; pt noted to have decreased balance due to anterior leaning.     Cognitive/Visual Perceptual:  Cognitive/Psychosocial Skills:     -       Oriented to: Person, Place, Time, and Situation   -       Follows Commands/attention:Follows one-step commands  -       Communication: clear/fluent    Physical Exam:  Patient demonstrated good functional mobility in room while performing tasks.   Pt  ROM: WFL  Pt Strength: WFL  Pt coordination: mildly impaired on BUE  Pt sensation: normal    AMPAC 6 Click ADL:  AMPAC Total Score: 18    Treatment & Education:  Pt found to be functional with ADL tasks but to have decreased safety insight. Pt educated on OT roll in treatment and rehabilitation process.    Patient left sitting edge of bed with  RN notified and sitter present    GOALS:   Multidisciplinary Problems       Occupational Therapy Goals          Problem: Occupational Therapy    Goal Priority Disciplines Outcome Interventions   Occupational Therapy Goal     OT, PT/OT Ongoing, Progressing    Description: Goals to be met by: 8/12/23    Patient will increase functional independence with ADLs by performing:    UE Dressing with [place].  LE Dressing with [place].  Grooming while standing with [place].                         History:     Past Medical History:   Diagnosis Date    Seizures        No past surgical history on file.    Time Tracking:     OT Date of Treatment: 07/12/23  OT Start Time: 1339  OT Stop Time: 1355  OT Total Time (min):  16 min    Billable Minutes:Evaluation 16 minutes    7/12/2023

## 2023-07-12 NOTE — ASSESSMENT & PLAN NOTE
Due to seizure, JUAN  7/12- - still paranoid, depakote 250 q 8 hours prn,  PEC order in place. Psych consuted

## 2023-07-12 NOTE — PLAN OF CARE
PT eval complete, plan of care established    2023    Problem: Physical Therapy  Goal: Physical Therapy Goal  Description: Goals to be met by: 2023     Patient will increase functional independence with mobility by performin. Supine to sit with independence  2. Sit to supine with independence  3. Sit to stand transfer with independence  4. Gait  x 600 feet with independence  Outcome: Ongoing, Progressing

## 2023-07-12 NOTE — ASSESSMENT & PLAN NOTE
OSH EKG noted to have hyperacute T waves in anteroseptal leads and troponin trending up from 1.7 to 4.7  Received rectal ASA and started on heparin gtt   Utox positive for cocaine.   On arrival, troponin trended up to 5.8 and EKG revealed STEMI  · Continue low intensity heparin gtt   · Trending troponin   · O2   · Consulted interventional cardiology, recs:   · Not a candidate for cath lab due to sepsis and out of window   · loaded with ASA rectally prior to arrival here  · Plavix load 600 via NG tube  · Cont hep gtt  · Bedside echo w EF 20%, apical ballooning and normal wall motion at the bases concerning for Tokutsubo pattern  · Start ASA 81 qd and plavix 75 qd  · Atorvastatin 80  · Formal echo   · F/up with cardiology post discharge   · General cardiology consult for further management   · 7/11/2023 Cards following

## 2023-07-12 NOTE — NURSING
I am still waiting for Zovirax 600 mg in dextrose 5% 100 ml IVPB from the pharmacy.  Meds was due @ 0000.  Pharmacy made aware.

## 2023-07-12 NOTE — PLAN OF CARE
Problem: Occupational Therapy  Goal: Occupational Therapy Goal  Description: Goals to be met by: 8/12/23    Patient will increase functional independence with ADLs by performing:    UE Dressing with [place].  LE Dressing with [place].  Grooming while standing with [place].    Outcome: Ongoing, Progressing

## 2023-07-12 NOTE — NURSING
"0010 AM OOB attempting to leave.  States, I wanna go home.  I am leaving.  I do not listen to what y"all are saying.  I am leaving!"  Nurse and 2 PCTs in room and talking with pt.  Difficult to redirect at this point in time.  WCTM.    0025 AM Pt back in bed, cracking jokes.  Safety maintained and WCTM.      "

## 2023-07-12 NOTE — PLAN OF CARE
Problem: Fall Injury Risk  Goal: Absence of Fall and Fall-Related Injury  Outcome: Ongoing, Progressing  Intervention: Promote Injury-Free Environment  Flowsheets (Taken 7/12/2023 0418)  Safety Promotion/Fall Prevention:   Fall Risk signage in place   Fall Risk reviewed with patient/family   lighting adjusted   nonskid shoes/socks when out of bed   supervised activity     Problem: Seizure, Active Management  Goal: Absence of Seizure/Seizure-Related Injury  Outcome: Ongoing, Progressing  Intervention: Prevent Seizure-Related Injury  Flowsheets (Taken 7/12/2023 0418)  Seizure Precautions:   activity supervised   clutter-free environment maintained   emergency equipment at bedside   Was very anxious and restless.  Attempted on multiple times to leave and go home.  Refused tele, IVPB and IM meds.  Looked very suspicious when asked to take IVPB but was able to take PO meds.  Voided on the floor twice, observed pacing back and forth and re arranging the room.  Sitter at the bedside.  No falls and no seizure activity noted.  Dizepam 5 mg PO and Zyprexa 5 mg PO administered.  Meds somewhat effective.  Remains PECd.  WCTM.

## 2023-07-12 NOTE — NURSING
"2315PM  Pt out of the bed and attempting to leave.  Confused and was observed looking for stuff under the bed.  Cracking jokes.  States,"I am leaving this place.  I wanna go home to see my dog bro!"  Nurse and sitter in room with the pt.  Safety and seizure precautions maintained.  WCTM.  "

## 2023-07-12 NOTE — NURSING
"Pt walking around in room and looking down the bed while holding the scanner and tele box.  He sates,"This is my cell phone!"  Charge, Nurse, Nurse and the sitter in room with the pt.  Refusing to to back to bed.  "

## 2023-07-12 NOTE — ASSESSMENT & PLAN NOTE
Utox positive for cocaine, THC. Serum alcohol (-)  Hx of ETOH abuse  On valium and olanzapine in [facility]  Psych consulted in [facility]. They saw him 7/11;- Recommend to discontinue zyprexa; patient with prolonged QTc  Can use PRN Depakote 125 mg - 250 mg PO BID-TID PRN agitation not related to symptoms of alcohol withdrawal  7/12- Increase valium and dc phenobarbitol, then gradually wean valium over next few days.

## 2023-07-12 NOTE — ASSESSMENT & PLAN NOTE
Unclear when pt had his last drink. EtOH was undetectable c/f possible alcohol withdrawal or delirium tremens. He has required multiple doses of mostly benzodiazepines to keep his agitation under control.    CIWA protocol  - on valium, needs tapering   PETH, ethanol    LFTs    Thiamine    Folate   Prn valium - receiving for anxiety, will weanvalium off ( now valium 5 q 12),  and use depakote for agitation/ paranoia

## 2023-07-12 NOTE — ASSESSMENT & PLAN NOTE
OSH EKG noted to have hyperacute T waves in anteroseptal leads and troponin trending up from 1.7 to 4.7  Received rectal ASA and started on heparin gtt   Utox positive for cocaine.   On arrival, troponin trended up to 5.8 and EKG revealed STEMI      Continue low intensity heparin gtt    Trending troponin    O2    Consulted interventional cardiology, recs:    Not a candidate for cath lab due to sepsis and out of window    loaded with ASA rectally prior to arrival here   Plavix load 600 via NG tube   Cont hep gtt   Bedside echo w EF 20%, apical ballooning and normal wall motion at the bases concerning for Tokutsubo pattern   Start ASA 81 qd and plavix 75 qd   Atorvastatin 80   Formal echo    F/up with cardiology post discharge    General cardiology consult for further management:   Repeat TTE in 1-2 weeks.    Patient will need GDMT upon discharge including SGLT2i (Jardiance)  and Entresto   Cards signed off. F/u in [facility]    7/12- heparin stopped. ACS protocol stopped. Repeat ECHO in 2 weeks. On Asa, Plavix, entresto, need to add jardiance. PharmD checking price on Jardance

## 2023-07-12 NOTE — NURSING
"Refused 4 EYES assessment.  States,"We are not doing all that."  Calm and cooperative with care.  "

## 2023-07-12 NOTE — NURSING
"Pt pacing around in room and c/o being watched by the sitter.  Removed the tele leads.  States,"Why are you watching me!.  Are you working!  Stop watching me!'  Diazepam 5 mg PO administered.  WCTM.  "

## 2023-07-12 NOTE — NURSING
"Pt refused Acyclovir 600mg in D5W 100 ml IVPB.  Looked very suspicious and told the nurse that he was not going to take it.  States,"No shooting meds in my arm.  I m not gonna take that!"  Agreed to take Zyprexa 5 mg PO.  WCTM.  "

## 2023-07-12 NOTE — PT/OT/SLP EVAL
"Physical Therapy Co-Evaluation and Co-Treatment    Patient Name:  Paty Jiménez   MRN:  [id number]    Co-evaluation and co-treatment performed for this visit due to suspected patient need for two skilled therapists to ensure patient and staff safety and to accommodate for patient activity tolerance/pain management   Recommendations:     Discharge Recommendations: home   Discharge Equipment Recommendations: none   Barriers to discharge: None    Assessment:     Paty Jiménez is a 63 y.o. male admitted with a medical diagnosis of Seizure. He presents with the following impairments/functional limitations: gait instability, decreased safety awareness. Patient demonstrates slight gait instability but pt reports he is mobilizing close to his baseline.    Rehab Prognosis: Good; patient would benefit from acute skilled PT services 2 x/week to address these deficits and reach maximum level of function.  Recent Surgery: * No surgery found *      Plan:     During this hospitalization, patient to be seen 2 x/week to address the identified rehab impairments via gait training, therapeutic activities, therapeutic exercises and progress toward the following goals:    Plan of Care Expires:  08/12/23    Subjective     Chief Complaint: None verbalized   Patient/Family Comments/Goals: "I've been great ever since I stopped drinking"  Pain/Comfort:  Pain Rating 1: 0/10    Patients cultural, spiritual, Roman Catholic conflicts given the current situation: no    Living Environment:  Living Environment: Patient lives alone in a single story house (niece's guest house) with number of outside stair(s): 0 and tub-shower combo.  Prior Level of Function: Prior to admission, patient was independent and not driving and retired. Pt reports he bikes everywhere, does not have a car.  Equipment Used at Home: none.  DME owned (not currently used): none  Assistance Upon Discharge: unknown    Objective:     Communicated with nursing prior to session. Patient " found  sitting on rolling chair in room  with telemetry upon PT entry to room.    General Precautions: Standard, fall  Orthopedic Precautions:N/A    Braces: N/A    Exams:  Cognitive Exam:  Patient is oriented to Person, Place, Time, Situation, follows commands 100% of the time  RLE ROM: WFL  RLE Strength: WFL, grossly 5/5  LLE ROM: WFL  LLE Strength: WFL, grossly 5/5  Sensation: Intact light touch to BLEs  Coordination: WFL speed and accuracy heel to shin, discoordination noted while donning socks in seated    Functional Mobility:  Bed Mobility:    Seated in chair at start of session and returned to sitting edge of bed  Transfers:    Sit to Stand: supervision with no AD  Gait: Patient ambulated 300 ft with no AD and supervision. Patient demonstrates occasional unsteady gait and narrow base of support. No overt LOB noted. All lines remained intact throughout ambulation trial.  Balance:   Static Sitting: Good, able to maintain for 3 minute(s) with independence  Dynamic Sitting: Good: Patient accepts moderate challenge, contact guard assistance  Static Standing: Good, able to maintain for 15 seconds with supervision  Dynamic Standing: Fair: Patient accepts minimal challenge, supervision    Therapeutic Activities and Exercises:  Patient educated on role of acute care PT and PT POC and safety while in hospital including calling nurse for mobility  Patient is clear to ambulate in hallways with RN/PCT  Performed mobility as described above to increase tolerance to OOB mobility    AM-PAC 6 CLICK MOBILITY  Total Score:18     Patient left sitting edge of bed with all lines intact, call button in reach, RN notified, and sitter present.    GOALS:   Multidisciplinary Problems       Physical Therapy Goals          Problem: Physical Therapy    Goal Priority Disciplines Outcome Goal Variances Interventions   Physical Therapy Goal     PT, PT/OT Ongoing, Progressing     Description: Goals to be met by: 7/26/2023     Patient will  increase functional independence with mobility by performin. Supine to sit with independence  2. Sit to supine with independence  3. Sit to stand transfer with independence  4. Gait  x 600 feet with independence                       History:     Past Medical History:   Diagnosis Date    Seizures        No past surgical history on file.    Time Tracking:     PT Received On: 23  PT Start Time: 1339     PT Stop Time: 1355  PT Total Time (min): 16 min     Billable Minutes: Evaluation 8 min Therapeutic Activity 8 min      2023

## 2023-07-12 NOTE — ASSESSMENT & PLAN NOTE
This patient does not have evidence of infective focus  My overall impression is SIRS uon admission due to seizure .  Cultures from 7/8 and 7/9 are negative  Antibiotics given-   Antibiotics (72h ago, onward)    Start     Stop Route Frequency Ordered    07/08/23 2145  mupirocin 2 % ointment         07/13 2059 Nasl 2 times daily 07/08/23 2039        Latest lactate reviewed-  Recent Labs   Lab 07/09/23  2350 07/10/23  0423 07/10/23  1258   LACTATE 2.2  2.2 2.3* 2.2     STABLE

## 2023-07-12 NOTE — MEDICAL/APP STUDENT
"Collateral from Ms Grant (Ne) Tino (Niece): [phone]      Pt lives behind his nieces home. He does not need any assistance with ADLs and is AxOx4 at baseline. Pt last known normal was the day prior to his seizure. She went to check in on him x4 days ago and found him on the ground, confused, and he had urinated on himself. She believes he had beer that morning as she found an empty cup near him. She states he has been having a seizure every 2-3 months for the past 6 months. Prior to January 2023 he had never had a seizure. He has no psych hx, never been on psych meds, and never seen a psychiatrist. His Niece says he starts his day with a bicycle ride where he then picks up "a large beer that he gets 3 cups out of the one bottle" and then goes to the race track. She does not know if he gets more beer throughout the rest of the day and she has never seen him drink hard liquor. He smokes  ~1 pack of cigarettes every 1-2 weeks. Smokes 2 joints of marijuana over a week. Niece denies any use of any other illicit drugs.  "

## 2023-07-12 NOTE — PROGRESS NOTES
[facility] - Neuro Critical Care  Neurocritical Care  Progress Note    Admit Date: 7/8/2023  Service Date: 07/11/2023  Length of Stay: 3    Subjective:     Chief Complaint: Seizure    History of Present Illness: Paty Jiménez is a 64yo M w PMHx of HTN, CAD, EtOH use, and seizures on keppra presenting to OSH ED via EMS for evaluation of a seizure. Pt lives in his niece's guest house. Niece found him sitting on the couch around 10am sitting in urine and feces with his eyes rolled back and mouth bleeding. Niece said that this happens once every 1-2 months and pt is brought to the hospital with workups revealing seizures. The last seizures were in 5/2023 and 4/2023. Niece's daughter checks on the pt daily to see if he takes his keppra, and pt is mostly compliant per niece. Niece said that she brought the pt water and he was able to drink it and speak close to his baseline. Pt was taken to OSH via EMS. Pt was confused and combative on arrival. Pt did not have witnessed seizures in ED. He was oriented to self and moved purposefully. Pt was found to be septic with tachycardia, increased RR, temp 103F, lactic 3.9, procal 1.7 to 4.91. He received rectal acetaminophen. Started on vanc, zosyn, acyclovir, and rocephin. Pt fluid resuscitated 2L LR. CTH was negative for acute processes. LP was performed (glucose 82, protein 42, WBC 1) and labs sent. Unclear when pt had his last drink. EtOH was undetectable c/f possible alcohol withdrawal or delirium tremens. He has required multiple doses of mostly benzodiazepines to keep his agitation under control.                 He was found to have an EKG with hyperacute T-waves in anteroseptal leads and troponin 1.7 that trended up to 4.7. Pt was given rectal aspirin, started on heparin drip, and transferred for further evaluation and workup. Utox positive for cocaine, THC. Pt had rectal bleeding from an internal hemorrhoid after several medications were given and temps were taken rectally. OSH  did not have cath lab. Pt was directly admitted to [facility] for higher level of care and cardiology evaluation. On arrival, troponin trended up to 5.8 and EKG revealed STEMI.       Hospital Course: 7/10/23: ok for step down to hospital medicine  7/11/2023 Dc heparin, q4h neuro checks, zyprexa bid 5 mg, PECed, Psyc consulted         ROS:  Unable to obtain due to confusion      Vitals:  Temp: 98.1 °F (36.7 °C)  Pulse: (!) 112  Rhythm: sinus arrhythmia  BP: (!) 143/81  MAP (mmHg): 104  Resp: (!) 22  SpO2: 96 %    Temp  Min: 98 °F (36.7 °C)  Max: 98.8 °F (37.1 °C)  Pulse  Min: 90  Max: 118  BP  Min: 109/62  Max: 160/78  MAP (mmHg)  Min: 78  Max: 113  Resp  Min: 12  Max: 30  SpO2  Min: 92 %  Max: 99 %    07/10 0701 - 07/11 0700  In: 626.6 [I.V.:77.6]  Out: 550 [Urine:550]   Unmeasured Output  Urine Occurrence: 1  Stool Occurrence: 1        Physical Exam  Vitals and nursing note reviewed.   Constitutional:       Appearance: Normal appearance.   HENT:      Head: Normocephalic.      Nose: Nose normal.      Mouth/Throat:      Mouth: Mucous membranes are moist.      Pharynx: Oropharynx is clear.   Eyes:      Pupils: Pupils are equal, round, and reactive to light.   Cardiovascular:      Rate and Rhythm: Normal rate and regular rhythm.      Pulses: Normal pulses.      Heart sounds: Normal heart sounds.   Pulmonary:      Effort: Pulmonary effort is normal.      Breath sounds: Normal breath sounds.   Abdominal:      General: Bowel sounds are normal.      Palpations: Abdomen is soft.   Musculoskeletal:         General: Normal range of motion.   Skin:     General: Skin is warm and dry.      Capillary Refill: Capillary refill takes 2 to 3 seconds.   Neurological:      Mental Status: He is alert.      Comments: Confused  PERRL  YA  to command and spontaneously   Sensation Intact X4          Medications:  Continuous   Scheduledacyclovir, 10 mg/kg, Q8H  aspirin, 81 mg, Daily  clopidogreL, 75 mg, Daily  folic acid, 1 mg, Daily  mupirocin,  , BID  PHENobarbitaL, 30 mg, BID   Followed by  [START ON 7/12/2023] PHENobarbitaL, 15 mg, BID   Followed by  [START ON 7/14/2023] PHENobarbitaL, 8 mg, BID   Followed by  [START ON 7/15/2023] PHENobarbitaL, 4 mg, BID  polyethylene glycol, 17 g, Daily  sacubitriL-valsartan, 1 tablet, BID  senna-docusate 8.6-50 mg, 1 tablet, BID  thiamine, 100 mg, Daily    PRNacetaminophen, 1,000 mg, Q8H PRN  acetaminophen, 650 mg, Q4H PRN  diazePAM, 5 mg, Q8H PRN  magnesium oxide, 800 mg, PRN  magnesium oxide, 800 mg, PRN  OLANZapine, 5 mg, BID PRN  ondansetron, 4 mg, Q8H PRN  potassium bicarbonate, 35 mEq, PRN  potassium bicarbonate, 50 mEq, PRN  potassium bicarbonate, 60 mEq, PRN  potassium, sodium phosphates, 2 packet, PRN  potassium, sodium phosphates, 2 packet, PRN  potassium, sodium phosphates, 2 packet, PRN  prochlorperazine, 5 mg, Q6H PRN  sodium chloride 0.9%, 10 mL, PRN      Today I personally reviewed pertinent medications, lines/drains/airways, imaging, cardiology results, laboratory results, microbiology results, notably:    Diet  [facility] Standard T[facility]  [facility]; Standard T[facility]          Assessment/Plan:     Neuro  * Seizure  62yo M w PMHx of HTN, CAD, EtOH use, and seizures on keppra presenting to OSH ED via EMS for evaluation of a seizure after being found post-ictal on his couch. Breakthrough seizures q1-2 months recently. Lactic and CK elevated.    Pt was found to be septic. He received rectal acetaminophen. Started on vanc, zosyn, acyclovir, and rocephin. Fluid resuscitated 2L LR.   LP was performed (glucose 82, protein 42, WBC 1) and labs sent. Add on labs ordered  Unclear when pt had his last drink. EtOH was WNL c/f possible alcohol withdrawal or delirium tremens.             OSH CTH negative for acute findings      Admit to NCC   q1h neuro checks and vitals   CBC, CMP, mag, phos daily    TSH, A1c, lipid panel, coags, Utox, EtOH   HSV PCR   Echo, EKG,  CXR   vEEG  o Appreciate epilepsy recs    Keppra loaded w 2.5g     Keppra 1000 BID    F/u LP labs  o Add on ordered: Meningitis encephalitis panel  o Add on ordered: [place] send out- CSFME   Vanc, cefepime, ampicillin, acyclovir    SCD; hold chemical VTE ppx in acute period    PT/OT/SLP   Avoid agents that reduce seizure threshold    Seizure precautions   Patient has alcohol withdrawal seizures that does not require AED's    Phenobarbital     Psychiatric  Alcohol abuse  Unclear when pt had his last drink. EtOH was undetectable c/f possible alcohol withdrawal or delirium tremens. He has required multiple doses of mostly benzodiazepines to keep his agitation under control.   · CIWA protocol   · PETH, ethanol    · LFTs   · Thiamine   · Folate  · Prn valium   7/11/2023 Dc heparin, q4h neuro checks, zyprexa bid 5 mg, PECed, Psyc consulted     Cardiac/Vascular  STEMI (ST elevation myocardial infarction)  OSH EKG noted to have hyperacute T waves in anteroseptal leads and troponin trending up from 1.7 to 4.7  Received rectal ASA and started on heparin gtt   Utox positive for cocaine.   On arrival, troponin trended up to 5.8 and EKG revealed STEMI  · Continue low intensity heparin gtt   · Trending troponin   · O2   · Consulted interventional cardiology, recs:   · Not a candidate for cath lab due to sepsis and out of window   · loaded with ASA rectally prior to arrival here  · Plavix load 600 via NG tube  · Cont hep gtt  · Bedside echo w EF 20%, apical ballooning and normal wall motion at the bases concerning for [place] pattern  · Start ASA 81 qd and plavix 75 qd  · Atorvastatin 80  · Formal echo   · F/up with cardiology post discharge   · General cardiology consult for further management   · 7/11/2023 Cards following    Elevated troponin  See stemi  Cards following    HTN (hypertension)  SBP <160    Other  Tobacco use disorder  Smoking cessation education          Activity Orders          Diet Cardiac [facility]; Standard Tray: Cardiac starting at 07/10 1137    Progressive Mobility Protocol (mobilize patient to their highest level of functioning at least twice daily) starting at 07/08 2000        Full Code    Yogesh Pina NP  Neurocritical Care  [facility] - Neuro Critical Care

## 2023-07-12 NOTE — ASSESSMENT & PLAN NOTE
62yo M w PMHx of HTN, CAD, EtOH use, and seizures on keppra presenting to OSH ED via EMS for evaluation of a seizure after being found post-ictal on his couch. Breakthrough seizures q1-2 months recently. Lactic and CK elevated.     Pt was found to be septic. He received rectal acetaminophen. Started on vanc, zosyn, acyclovir, and rocephin. Fluid resuscitated 2L LR.   LP was performed (glucose 82, protein 42, WBC 1) and labs sent. Add on labs ordered  Unclear when pt had his last drink. EtOH was WNL c/f possible alcohol withdrawal or delirium tremens.             OSH CTH negative for acute findings       Admit to NCC   q1h neuro checks and vitals   CBC, CMP, mag, phos daily    TSH, A1c, lipid panel, coags, Utox, EtOH   HSV PCR, HSV 1& 2 are negative   Echo, EKG, CXR   vEEG  ? Appreciate epilepsy recs    Keppra loaded w 2.5g     Keppra 1000 BID    F/u LP labs  ? Add on ordered: Meningitis encephalitis panel  ? Add on ordered: [place] send out- CSFME   Vanc, cefepime, ampicillin, acyclovir    SCD; hold chemical VTE ppx in acute period    PT/OT/SLP   Avoid agents that reduce seizure threshold    Seizure precautions   Patient has alcohol withdrawal seizures that does not require AED's .     7/12- Phenobarbital weaning off.  Wean valium off. No seizures. Can dc acylclovir. depakote started prn agitation

## 2023-07-12 NOTE — PROGRESS NOTES
CONSULTATION LIAISON PSYCHIATRY PROGRESS NOTE    Patient Name: Paty Jiménez  MRN: [id number]  Patient Class: IP- Inpatient  Admission Date: 7/8/2023  Attending Physician: Adelaida Maynard MD      SUBJECTIVE:   Paty Jiménez is a 63 y.o. male with no known psychiatric history & past pertinent medical history of HTN, CAD, alcohol abuse, and Seizures presented to the ED/admitted to the hospital for Seizure. Per EMS, they were called out by family for patient having a possible unwitnessed seizure. Pt lives in his niece's guest house. Niece found him sitting on the couch around 10am sitting in urine and feces with his eyes rolled back and mouth bleeding. Niece said that this happens once every 1-2 months and pt is brought to the hospital with workups revealing seizures. The last seizures were in 5/2023 and 4/2023. Per EMS, patient was found post-ictal in a garage with no AC, temp on scene was documented as 103F. Pt's axillary temp was 101.6.     In the ED, he was not verbalizing or following any commands, and was intermittently extremely physically agitated/aggressive. Also with frequent, foul smelling diarrhea. Pt was found to be septic with tachycardia, increased RR, temp 103F. Started on antibiotics. Unclear when pt had his last drink. EtOH was undetectable c/f possible alcohol withdrawal or delirium tremens. He required multiple doses of mostly benzodiazepines to keep his agitation under control in the ED. On arrival, troponin trended up to 5.8 and EKG revealed STEMI.      Utox positive for cocaine, THC. Serum alcohol (-)     Psychiatry consulted for evaluation for suspected worsening mental status in setting of active EtOH w/d requiring patient to be PEC'd for grave disability.     Per nursing staff, pt was pacing in room, urinating on floor, attempting to leave, and refusing meds last night. He received PRN Valium 5 PO @ 0130 and PRN Zyprexa 5 PO @ 0300. Today, pt is accompanied by his sister at bedside. MERVIN ching self,  "month (not to place). Reports drinking a 6-pack of beer daily w/ last drink 4 d ago. Voices no new concerns at present.         OBJECTIVE:    Mental Status Exam:  General Appearance: appears stated age, dressed in hospital garb, in no acute distress, seated on bed  Behavior: cooperative, friendly, pleasant, under good behavioral control  Involuntary Movements and Motor Activity: no abnormal involuntary movements noted  Gait and Station: unable to assess - patient lying down or seated  Speech and Language: normal rate, normal volume, normal tone, conversational  Mood: "good"  Affect: constricted  Thought Process and Associations:  superficially linear  Thought Content and Perceptions:: no suicidal ideation, no homicidal ideation, no hallucinations, no abhilash delusional content noted  Sensorium and Orientation: alert, oriented to month, disoriented to place  Recent and Remote Memory: grossly intact  Attention and Concentration: attentive to conversation, passes [facility]  Fund of Knowledge: grossly intact  Insight: limited/partial awareness of illness  Judgment: impaired    CAM ICU positive? no      ASSESSMENT & RECOMMENDATIONS     Alcohol use disorder, severe   R/o other substance use disorder (UDS +THC, cocaine)   Alcohol withdrawal  Delirium    PSYCH MEDICATIONS  Scheduled:   Continue valium taper for alcohol withdrawal per primary team  Phenobarbital discontinued  Continue folate and thiamine supplementation  PRN:  Continue depakote 250 mg PO q8h prn non-redirectable agitation  Avoid antipsychotics as QTc 539 (7/9)    DELIRIUM BEHAVIOR MANAGEMENT  Continue to manage medical conditions and assess for and treat pain.  Please try to minimize the use of physical restraints, as they can worsen agitation; utilize PRN medications first.  Please avoid/minimize use of benzodiazepines (understand that may need to use BZD taper for EtOH withdrawal), anticholinergics, antihistamines (H1- and H2-blockers), and opioids when " possible, as they may exacerbate delirium.  Please keep shades open and lights on during day and shades closed and lights off at night to encourage normal sleep/wake cycle.  Reduce unnecessary stimulation/noise. TV screen and sound should be off unless patient is actively engaged w/ the program.  Encourage staff to reorient pt as needed throughout the day and to optimize pt's surroundings w/ an accurately updated calendar, and functioning clock.  Frequently remind pt of reason for hospitalization and the plan of care.  Attempt to maintain consistency in nursing staff.  Encourage family to be present as much as possible.   Correct sensory deficits, when present, with provision of the pt's eyeglasses and/or hearing aids.  Optimize nutrition and hydration status.  Recommend PT/OT consult. Early mobility and exercise have been shown to decrease duration of delirium    RISK ASSESSMENT  Continue PEC for now for grave disability & NEEDS 1:1 sitter    FOLLOW-UP  Will follow up while in house  Outpt referral to addiction psychiatry    DISPOSITION - once medically cleared:  Once delirium resolves, pt will likely not require inpt psych hospitalization.  Defer to medical team    Please contact ON CALL psychiatry service (24/7) for any acute issues that may arise.      Chadwick Langley MD  [facility], PGY-II      --------------------------------------------------------------------------------------------------------------------------------------------------------------------------------------------------------------------------------------    CONTINUED OBJECTIVE clinical data & findings reviewed and noted for above decision making    Current Medications:   Scheduled Meds:    [START ON 7/13/2023] aspirin  81 mg Oral Daily    [START ON 7/13/2023] clopidogreL  75 mg Oral Daily    diazePAM  5 mg Oral Q12H    folic acid  1 mg Oral Daily    mupirocin   Nasal BID    PHENobarbitaL  15 mg Oral BID    Followed by    [START ON  7/14/2023] PHENobarbitaL  8 mg Oral BID    Followed by    [START ON 7/15/2023] PHENobarbitaL  4 mg Oral BID    polyethylene glycol  17 g Oral Daily    potassium, sodium phosphates  2 packet Oral QID (AC & HS)    sacubitriL-valsartan  1 tablet Oral BID    senna-docusate 8.6-50 mg  1 tablet Oral BID    thiamine  100 mg Oral Daily     PRN Meds: acetaminophen, acetaminophen, divalproex, sodium chloride 0.9%    Allergies:   Review of patient's allergies indicates:  No Known Allergies    Vitals  Vitals:    07/12/23 0812   BP: (!) 153/99   Pulse: 102   Resp: 20   Temp: 97.6 °F (36.4 °C)       Labs/Imaging/Studies:  Recent Results (from the past 24 hour(s))   Comprehensive metabolic panel    Collection Time: 07/12/23  5:29 AM   Result Value Ref Range    Sodium 136 136 - 145 mmol/L    Potassium 3.4 (L) 3.5 - 5.1 mmol/L    Chloride 100 95 - 110 mmol/L    CO2 23 23 - 29 mmol/L    Glucose 117 (H) 70 - 110 mg/dL    BUN 7 (L) 8 - 23 mg/dL    Creatinine 0.8 0.5 - 1.4 mg/dL    Calcium 8.7 8.7 - 10.5 mg/dL    Total Protein 6.9 6.0 - 8.4 g/dL    Albumin 3.1 (L) 3.5 - 5.2 g/dL    Total Bilirubin 1.0 0.1 - 1.0 mg/dL    Alkaline Phosphatase 96 55 - 135 U/L     (H) 10 - 40 U/L    ALT 96 (H) 10 - 44 U/L    eGFR >60.0 >60 mL/min/1.73 m^2    Anion Gap 13 8 - 16 mmol/L   Magnesium    Collection Time: 07/12/23  5:29 AM   Result Value Ref Range    Magnesium 1.6 1.6 - 2.6 mg/dL   Phosphorus    Collection Time: 07/12/23  5:29 AM   Result Value Ref Range    Phosphorus 1.9 (L) 2.7 - 4.5 mg/dL   CBC auto differential    Collection Time: 07/12/23  5:29 AM   Result Value Ref Range    WBC 6.63 3.90 - 12.70 K/uL    RBC 3.32 (L) 4.60 - 6.20 M/uL    Hemoglobin 10.2 (L) 14.0 - 18.0 g/dL    Hematocrit 29.7 (L) 40.0 - 54.0 %    MCV 90 82 - 98 fL    MCH 30.7 27.0 - 31.0 pg    MCHC 34.3 32.0 - 36.0 g/dL    RDW 14.3 11.5 - 14.5 %    Platelets 114 (L) 150 - 450 K/uL    MPV 12.1 9.2 - 12.9 fL    Immature Granulocytes 0.6 (H) 0.0 - 0.5 %    Gran # (ANC)  4.0 1.8 - 7.7 K/uL    Immature Grans (Abs) 0.04 0.00 - 0.04 K/uL    Lymph # 1.6 1.0 - 4.8 K/uL    Mono # 1.0 0.3 - 1.0 K/uL    Eos # 0.0 0.0 - 0.5 K/uL    Baso # 0.02 0.00 - 0.20 K/uL    nRBC 0 0 /100 WBC    Gran % 60.0 38.0 - 73.0 %    Lymph % 23.4 18.0 - 48.0 %    Mono % 15.2 (H) 4.0 - 15.0 %    Eosinophil % 0.5 0.0 - 8.0 %    Basophil % 0.3 0.0 - 1.9 %    Differential Method Automated      Imaging Results              CT Head Without Contrast (Final result)  Result time 07/08/23 14:59:26      Final result by Azeem Iqbal MD (07/08/23 14:59:26)                   Impression:      No acute intracranial process.  Additional evaluation, as clinically warranted.    Changes of chronic vessel ischemic disease and cerebral volume loss.    Motion limited examination.      Electronically signed by: Azeem Iqbal MD  Date:    07/08/2023  Time:    14:59               Narrative:    EXAMINATION:  CT HEAD WITHOUT CONTRAST    CLINICAL HISTORY:  Mental status change, unknown cause;    TECHNIQUE:  Low dose axial images were obtained through the head.  Coronal and sagittal reformations were also performed. Contrast was not administered.    COMPARISON:  CT head dated 05/12/2023.    MRI of the brain dated 01/23/2023.    FINDINGS:  The subcutaneous tissues are unremarkable.  The bony calvarium is intact.  There is a chronic fracture involving the left orbital floor.  The paranasal sinuses are unremarkable.  The mastoid air cells are clear.  The orbits and intraorbital contents are within normal limits.    The craniocervical junction is intact.  The midline structures are unremarkable.  The ventricles and sulci are prominent, consistent cerebral volume loss.  There are extensive hypodensities within the periventricular and subcortical white matter.  The gray-white differentiation is maintained.  There is no dense vessel sign.  There is no evidence of mass effect.                                       X-Ray Chest AP Portable (Final  result)  Result time 07/08/23 14:38:37      Final result by Mile Sanchez MD (07/08/23 14:38:37)                   Impression:      As above.      Electronically signed by: Mile Sanchez MD  Date:    07/08/2023  Time:    14:38               Narrative:    EXAMINATION:  XR CHEST AP PORTABLE    CLINICAL HISTORY:  Sepsis;    TECHNIQUE:  Single frontal view of the chest was performed.    COMPARISON:  01/22/2023    FINDINGS:  Central pulmonary vascular congestion with mild interstitial edema.  No consolidation or pleural effusions.  Heart size is normal.  Skeletal structures are intact.

## 2023-07-12 NOTE — PROGRESS NOTES
Tai Baker - Neurosurgery ([facility])  [facility] Medicine  Progress Note    Patient Name: Paty Jiménez  MRN: [id number]  Patient Class: IP- Inpatient   Admission Date: 7/8/2023  Length of Stay: 4 days  Attending Physician: Adelaida Maynard MD  Primary Care Provider: To Obtain Unable        Subjective:     Principal Problem:Seizure        HPI:  62yo M w PMHx of HTN, CAD, EtOH use, and seizures on keppra presenting to OSH ED via EMS for evaluation of a seizure. Pt lives in his niece's guest house. Niece found him sitting on the couch around 10am sitting in urine and feces with his eyes rolled back and mouth bleeding. Niece said that this happens once every 1-2 months and pt is brought to the hospital with workups revealing seizures. The last seizures were in 5/2023 and 4/2023. Niece's daughter checks on the pt daily to see if he takes his keppra, and pt is mostly compliant per niece. Niece said that she brought the pt water and he was able to drink it and speak close to his baseline. Pt was taken to OSH via EMS. Pt was confused and combative on arrival. Pt did not have witnessed seizures in ED. He was oriented to self and moved purposefully. Pt was found to be septic with tachycardia, increased RR, temp 103F, lactic 3.9, procal 1.7 to 4.91. He received rectal acetaminophen. Started on vanc, zosyn, acyclovir, and rocephin. Pt fluid resuscitated 2L LR. CTH was negative for acute processes. LP was performed (glucose 82, protein 42, WBC 1) and labs sent. Unclear when pt had his last drink. EtOH was undetectable c/f possible alcohol withdrawal or delirium tremens. He has required multiple doses of mostly benzodiazepines to keep his agitation under control.                 He was found to have an EKG with hyperacute T-waves in anteroseptal leads and troponin 1.7 that trended up to 4.7. Pt was given rectal aspirin, started on heparin drip, and transferred for further evaluation and workup. Utox positive for cocaine, THC. Pt had  rectal bleeding from an internal hemorrhoid after several medications were given and temps were taken rectally. OSH did not have cath lab. Pt was directly admitted to [facility] for higher level of care and cardiology evaluation. On arrival, troponin trended up to 5.8 and EKG revealed STEMI.         Hospital Course: 7/10/23: ok for step down to hospital medicine        Overview/Hospital Course:  7/7- lacks capacity. /67   Pulse 101   Temp 98 °F (36.7 °C)   Resp 28 . Puled out IVs. Still in [facility]    7/12- Transferred to Hos med. Pacing in room, paranoid delusions noted by nurse, confusion, refusing iv meds earlier today. Now he is calm and sitting on side the bed. I discussed his case with psychiatry outside of his room. No seizures. PEC order placed yesterday for grave disability. If stable, can dc the PEC  tomorrow. Sitter at bedside. Appreciate psych recs. Depakote started.  Dc phenobarb. Valium 10 q 8 and taper.       Interval History: see above    Review of Systems   Constitutional:  Positive for activity change. Negative for appetite change and fever.   HENT:  Negative for trouble swallowing.    Respiratory:  Negative for cough and shortness of breath.    Cardiovascular:  Negative for chest pain and leg swelling.   Gastrointestinal:  Negative for abdominal pain, diarrhea, nausea and vomiting.   Genitourinary:  Negative for difficulty urinating.   Musculoskeletal:  Negative for arthralgias, back pain, gait problem and neck stiffness.   Psychiatric/Behavioral:  Negative for behavioral problems.    Objective:     Vital Signs (Most Recent):  Temp: 98.3 °F (36.8 °C) (07/12/23 1119)  Pulse: 93 (07/12/23 1119)  Resp: 18 (07/12/23 1119)  BP: (!) 141/88 (07/12/23 1119)  SpO2: 99 % (07/12/23 1119) Vital Signs (24h Range):  Temp:  [97.6 °F (36.4 °C)-98.5 °F (36.9 °C)] 98.3 °F (36.8 °C)  Pulse:  [] 93  Resp:  [12-24] 18  SpO2:  [94 %-100 %] 99 %  BP: (120-160)/(60-99) 141/88     Weight: 59.9 kg (132 lb)  Body mass  index is 20.07 kg/m².  No intake or output data in the 24 hours ending 07/12/23 1319        Physical Exam  Constitutional:       General: He is not in acute distress.     Appearance: Normal appearance. He is not ill-appearing, toxic-appearing or diaphoretic.      Comments: Tall and thin   HENT:      Head: Normocephalic and atraumatic.      Nose: Nose normal.      Mouth/Throat:      Mouth: Mucous membranes are moist.      Pharynx: Oropharynx is clear.   Eyes:      General: No scleral icterus.     Extraocular Movements: Extraocular movements intact.      Conjunctiva/sclera: Conjunctivae normal.      Pupils: Pupils are equal, round, and reactive to light.   Cardiovascular:      Rate and Rhythm: Normal rate and regular rhythm.      Pulses: Normal pulses.      Heart sounds: Normal heart sounds.   Pulmonary:      Effort: Pulmonary effort is normal. No respiratory distress.      Breath sounds: Normal breath sounds. No stridor. No wheezing, rhonchi or rales.   Chest:      Chest wall: No tenderness.   Abdominal:      General: Abdomen is flat. Bowel sounds are normal. There is no distension.      Palpations: Abdomen is soft.      Tenderness: There is no abdominal tenderness. There is no right CVA tenderness, left CVA tenderness, guarding or rebound.   Musculoskeletal:         General: No swelling, tenderness, deformity or signs of injury. Normal range of motion.      Cervical back: Normal range of motion and neck supple. No rigidity or tenderness.      Right lower leg: No edema.      Left lower leg: No edema.   Skin:     General: Skin is warm and dry.      Coloration: Skin is not jaundiced.      Findings: No erythema or rash.   Neurological:      General: No focal deficit present.      Mental Status: He is alert and oriented to person, place, and time. Mental status is at baseline.      Motor: No weakness.      Gait: Gait normal.   Psychiatric:         Mood and Affect: Mood normal.         Behavior: Behavior normal.          Thought Content: Thought content normal.      Comments: Slow thinking, smiling, coperative           Significant Labs: All pertinent labs within the past 24 hours have been reviewed.  CBC:   Recent Labs   Lab 07/11/23 0133 07/12/23  0529   WBC 5.23 6.63   HGB 10.2* 10.2*   HCT 29.9* 29.7*   PLT 84* 114*     CMP:   Recent Labs   Lab 07/11/23 0133 07/12/23  0529   * 136   K 3.6 3.4*   CL 99 100   CO2 26 23    117*   BUN 8 7*   CREATININE 0.7 0.8   CALCIUM 8.3* 8.7   PROT 6.2 6.9   ALBUMIN 2.7* 3.1*   BILITOT 1.0 1.0   ALKPHOS 99 96   * 145*   * 96*   ANIONGAP 9 13       Significant Imaging: I have reviewed all pertinent imaging results/findings within the past 24 hours.      Assessment/Plan:      * Seizure  62yo M w PMHx of HTN, CAD, EtOH use, and seizures on keppra presenting to OSH ED via EMS for evaluation of a seizure after being found post-ictal on his couch. Breakthrough seizures q1-2 months recently. Lactic and CK elevated.     Pt was found to be septic. He received rectal acetaminophen. Started on vanc, zosyn, acyclovir, and rocephin. Fluid resuscitated 2L LR.   LP was performed (glucose 82, protein 42, WBC 1) and labs sent. Add on labs ordered  Unclear when pt had his last drink. EtOH was WNL c/f possible alcohol withdrawal or delirium tremens.             OSH CTH negative for acute findings       Admit to [facility]   q1h neuro checks and vitals   CBC, CMP, mag, phos daily    TSH, A1c, lipid panel, coags, Utox, EtOH   HSV PCR, HSV 1& 2 are negative   Echo, EKG, CXR   vEEG  ? Appreciate epilepsy recs    Keppra loaded w 2.5g     Keppra 1000 BID    F/u LP labs  ? Add on ordered: Meningitis encephalitis panel  ? Add on ordered: [place] send out- CSFME   Vanc, cefepime, ampicillin, acyclovir    SCD; hold chemical VTE ppx in acute period    PT/OT/SLP   Avoid agents that reduce seizure threshold    Seizure precautions   Patient has alcohol withdrawal seizures that does not require  AED's .     7/12- Phenobarbital weaning off.  Wean valium off. No seizures. Can dc acylclovir. depakote started prn agitation    Substance use disorder  Utox positive for cocaine, THC. Serum alcohol (-)  Hx of ETOH abuse  On valium and olanzapine in [facility]  Psych consulted in [facility]. They saw him 7/11;- Recommend to discontinue zyprexa; patient with prolonged QTc  Can use PRN Depakote 125 mg - 250 mg PO BID-TID PRN agitation not related to symptoms of alcohol withdrawal  7/12- Increase valium and dc phenobarbitol, then gradually wean valium over next few days.        Encephalopathy acute  Due to seizure, JUAN  7/12- - still paranoid, depakote 250 q 8 hours prn,  PEC order in place. Psych consuted      STEMI (ST elevation myocardial infarction)  OSH EKG noted to have hyperacute T waves in anteroseptal leads and troponin trending up from 1.7 to 4.7  Received rectal ASA and started on heparin gtt   Utox positive for cocaine.   On arrival, troponin trended up to 5.8 and EKG revealed STEMI      Continue low intensity heparin gtt    Trending troponin    O2    Consulted interventional cardiology, recs:    Not a candidate for cath lab due to sepsis and out of window    loaded with ASA rectally prior to arrival here   Plavix load 600 via NG tube   Cont hep gtt   Bedside echo w EF 20%, apical ballooning and normal wall motion at the bases concerning for [place] pattern   Start ASA 81 qd and plavix 75 qd   Atorvastatin 80   Formal echo    F/up with cardiology post discharge    General cardiology consult for further management:   Repeat TTE in 1-2 weeks.    Patient will need GDMT upon discharge including SGLT2i (Jardiance)  and Entresto   Cards signed off. F/u in [facility]    7/12- heparin stopped. ACS protocol stopped. Repeat ECHO in 2 weeks. On Asa, Plavix, entresto, need to add jardiance. PharmD checking price on Jardance    SIRS (systemic inflammatory response syndrome)  This patient does not have evidence of  infective focus  My overall impression is SIRS uon admission due to seizure .  Cultures from 7/8 and 7/9 are negative  Antibiotics given-   Antibiotics (72h ago, onward)    Start     Stop Route Frequency Ordered    07/08/23 2145  mupirocin 2 % ointment         07/13 2059 Nasl 2 times daily 07/08/23 2039        Latest lactate reviewed-  Recent Labs   Lab 07/09/23  2350 07/10/23  0423 07/10/23  1258   LACTATE 2.2  2.2 2.3* 2.2     STABLE    HTN (hypertension)  SBP <160  7/12- BP  134/91   Pulse 107    Elevated troponin  See stemi  Cards signed off    Tobacco use disorder  Nicotine patch prn              Alcohol abuse  Unclear when pt had his last drink. EtOH was undetectable c/f possible alcohol withdrawal or delirium tremens. He has required multiple doses of mostly benzodiazepines to keep his agitation under control.    CIWA protocol  - on valium, needs tapering   PETH, ethanol    LFTs    Thiamine    Folate   Prn valium - receiving for anxiety, will weanvalium off ( now valium 5 q 12),  and use depakote for agitation/ paranoia        VTE Risk Mitigation (From admission, onward)         Ordered     Place RADHA hose  Until discontinued         07/08/23 2007     IP VTE LOW RISK PATIENT  Once         07/08/23 2007                Discharge Planning   BROOKE: 7/17/2023     Code Status: Full Code   Is the patient medically ready for discharge?:     Reason for patient still in hospital (select all that apply): Patient trending condition  Discharge Plan A: Home with family        Adelaida Maynard MD  Department of Hospital Medicine   [facility] - Neurosurgery ([facility])

## 2023-07-12 NOTE — NURSING
"Pt calm.  Lying in bed.  States,"I am going to sleep!".  Sitter at the bedside.  Noise minimized.  Safety maintained and WCTM.  "

## 2023-07-12 NOTE — CONSULTS
"CONSULTATION LIAISON PSYCHIATRY INITIAL EVALUATION    Patient Name: Paty Jiménez  MRN: [id number]  Patient Class: IP- Inpatient  Admission Date: 7/8/2023  Attending Physician: Brittney Preciado MD      HPI:   Paty Jiménez is a 63 y.o. male with no known psychiatric history & past pertinent medical history of HTN, CAD, alcohol abuse, and Seizures presented to the ED/admitted to the hospital for Seizure. Per EMS, they were called out by family for patient having a possible unwitnessed seizure. Pt lives in his niece's guest house. Niece found him sitting on the couch around 10am sitting in urine and feces with his eyes rolled back and mouth bleeding. Niece said that this happens once every 1-2 months and pt is brought to the hospital with workups revealing seizures. The last seizures were in 5/2023 and 4/2023. Per EMS, patient was found post-ictal in a garage with no AC, temp on scene was documented as 103F. Pt's axillary temp was 101.6.    In the ED, he was not verbalizing or following any commands, and was intermittently extremely physically agitated/aggressive. Also with frequent, foul smelling diarrhea. Pt was found to be septic with tachycardia, increased RR, temp 103F. Started on antibiotics. Unclear when pt had his last drink. EtOH was undetectable c/f possible alcohol withdrawal or delirium tremens. He required multiple doses of mostly benzodiazepines to keep his agitation under control in the ED. On arrival, troponin trended up to 5.8 and EKG revealed STEMI.     Utox positive for cocaine, THC. Serum alcohol (-)      Psychiatry consulted for evaluation for suspected worsening mental status in setting of active EtOH w/d requiring patient to be PEC'd for grave disability.      On psych exam, patient was lying in bed with sitter at the bedside. He was oriented to self only. He was calm and cooperative. He stated that he was at "someone's house" and identified the window in his room as a garage. He stated that the " "year was 1966. He stated that the president was Abdi. He was able to state that he was in the hospital for a heart attack but was confused about the timeline. He reported being in alf and was brought to the hospital from alf 2 days ago. Patient was also confused about how the primary doctor knew that he was in the hospital and needed to speak with a psychiatrist. He endorsed drinking at least a 6 pack of beer daily and marijuana use. He denied a previous psychiatric history. Exam was limited as patient gave conflicting information during the assessment and often changed his answers when asked for clarification.    Of note, patient with +hallucinations. He stated that he seen a fly in his room and attempted to hit it. Again toward the end of the assessment, he pointed to a fly that he stated was sitting on his bed. Neither interviewer or sitter at the bedside seen a fly in the room or on his bed.     Per nursing, patient with similar hallucinations: Patient ripped out peripheral IV despite having a sitter in the room. Patient has blood all over pants, shirts, bed sheets, and floor. He doesn't believe that it is blood. It is his "daiquiri" on his clothes. He states that he is leaving this place, although he does not recall what "this place" is    Per chart review:  5/16/2023 - History of multiple witnessed tonic clonic activity, witnessed in ED and at home, attributed to most likely alcohol withdrawal vs toxic enceph (cocaine and THC)  4/7/2023 - Witness tonic clonic activity in [facility] ED, terminated with ativan 2 mg IV. Hx of 1 reported seizure earlier this year. Not on anti-epileptics  - possible etiologies included alcohol withdrawal, toxic enceph (cocaine and THC). Diagnosis of alcohol use disorder with concern for acute withdraw. Patient gave conflicting reports: 6 beers daily vs 2 pints daily, last drink 2 days ago vs last night. However, ethanol neg on admission. Utox + for cocaine and THC      Collateral " "with patient's permission:   Deferred at this time    Medical Review of Systems:  Pertinent items are noted in HPI.    Psychiatric Review of Systems (is patient experiencing or having changes in): Limited 2/2 patient's mental status  sleep: yes, patient and patient's nurse reported pt sleeping throughout the night since being admitted  appetite: no  weight: no  energy/anergy: no  interest/pleasure/anhedonia: no  somatic symptoms: no  libido:  Did not assess  anxiety/panic: no  guilty/hopelessness: no  concentration: no  Denisse: no  Psychosis: no  Trauma: no  S.I.B.s/risky behavior: no    Past Psychiatric History:  Previous Medication Trials: no  Previous Psychiatric Hospitalizations:no   Previous Suicide Attempts: no  History of Violence: no  Outpatient Psychiatrist: no  Family Psychiatric History: no    Substance Abuse History (with emphasis over the last 12 months):  Recreational Drugs: marijuana. UDS+ THC and cocaine (pt denied cocaine use)  Use of Alcohol:  Endorsed a 6 pack of beers daily  Tobacco Use:yes, 1 PPD  Rehab History:no    Social History:  Marital Status: not   Children: 2  Employment Status/Info: retired  :no  Education: high school diploma/GED  Special Ed: Not asked  Housing Status: with family  Access to gun: no  Psychosocial Stressors: did not endorse  Functioning Relationships: Unable to assess    Legal History:  Past Charges/Incarcerations: denied  Pending charges: Did not ask    Mental Status Exam:  General Appearance: dressed in hospital garb, lying in bed, in no acute distress  Behavior: cooperative, polite, appropriate eye-contact, redirectable, attempting to get out of beds at times  Involuntary Movements and Motor Activity: no abnormal involuntary movements noted  Gait and Station:  unable to assess  Speech and Language: normal rate, normal volume, normal tone, conversational  Mood: "good"  Affect: constricted, labile  Thought Process and Associations: bizarre  Thought " Content and Perceptions:: no suicidal ideation, no homicidal ideation, + visual hallucinations  Sensorium and Orientation: delirious, oriented to person only  Recent and Remote Memory: impaired, forgetful  Attention and Concentration: attentive to conversation  Fund of Knowledge: unable to identify the   Insight: limited/partial awareness of illness  Judgment: poor    CAM ICU positive? yes    ASSESSMENT & RECOMMENDATIONS   DELIRIUM  DELIRIUM BEHAVIOR MANAGEMENT  PLEASE utilize CHEMICAL restraints with PRN meds first for agitation. Minimize use of PHYSICAL restraints OR have periods of being out of physical restraints if possible.  Keep window shades open and room lit during day and room dim at night in order to promote normal sleep-wake cycles  Encourage family at bedside. Dunlap patient often to situation, location, date.  Continue to Limit or Discontinue use of Narcotics, Benzos and Anti-cholinergic medications as they may worsen delirium.  Continue medical workup for causative etiology of Delirium.  Recommend to discontinue zyprexa; patient with prolonged QTc  Can use PRN Depakote 125 mg - 250 mg PO BID-TID PRN agitation not related to symptoms of alcohol withdrawal    OTHER PERTINENT DIAGNOSIS  Alcohol withdrawal, however cannot rule out delirium at this time. Agree with detox protocol. On phenobarbital, per primary.  PRN valium for elevated CIWA >8    RISK ASSESSMENT  Continue PEC because patient is in imminent danger of hurting self or others and is gravely disabled. & NEEDS 1:1 sitter  Patient is currently delirious and lacks capacity at this time to leave AMA.    FOLLOW UP  Will follow up while in house    DISPOSITION - once medically cleared:   Once delirium clears, pt will likely not need psychiatric hospitalization  Defer to medical team    Please contact ON CALL psychiatry service (24/7) for any acute issues that may arise.    Dr. Norris Hannah  CL Psychiatry  [facility]  [place]  7/11/2023 9:09 PM        --------------------------------------------------------------------------------------------------------------------------------------------------------------------------------------------------------------------------------------    CONTINUED HISTORY & OBJECTIVE clinical data & findings reviewed and noted for above decision making    Past Medical/Surgical History:   Past Medical History:   Diagnosis Date    Seizures      No past surgical history on file.    Current Medications:   Scheduled Meds:    acyclovir  10 mg/kg Intravenous Q8H    aspirin  81 mg Per NG tube Daily    clopidogreL  75 mg Per NG tube Daily    folic acid  1 mg Oral Daily    mupirocin   Nasal BID    PHENobarbitaL  30 mg Per NG tube BID    Followed by    [START ON 7/12/2023] PHENobarbitaL  15 mg Per NG tube BID    Followed by    [START ON 7/14/2023] PHENobarbitaL  8 mg Per NG tube BID    Followed by    [START ON 7/15/2023] PHENobarbitaL  4 mg Per NG tube BID    polyethylene glycol  17 g Oral Daily    sacubitriL-valsartan  1 tablet Oral BID    senna-docusate 8.6-50 mg  1 tablet Oral BID    thiamine  100 mg Oral Daily     PRN Meds: acetaminophen, acetaminophen, diazePAM, magnesium oxide, magnesium oxide, OLANZapine, ondansetron, potassium bicarbonate, potassium bicarbonate, potassium bicarbonate, potassium, sodium phosphates, potassium, sodium phosphates, potassium, sodium phosphates, prochlorperazine, sodium chloride 0.9%    Allergies:   Review of patient's allergies indicates:  No Known Allergies    Vitals  Vitals:    07/11/23 1920   BP: (!) 158/94   Pulse:    Resp:    Temp:        Labs/Imaging/Studies:  Recent Results (from the past 24 hour(s))   APTT    Collection Time: 07/11/23  1:33 AM   Result Value Ref Range    aPTT 33.7 (H) 21.0 - 32.0 sec   Comprehensive metabolic panel    Collection Time: 07/11/23  1:33 AM   Result Value Ref Range    Sodium 134 (L) 136 - 145 mmol/L    Potassium 3.6 3.5 - 5.1 mmol/L     Chloride 99 95 - 110 mmol/L    CO2 26 23 - 29 mmol/L    Glucose 102 70 - 110 mg/dL    BUN 8 8 - 23 mg/dL    Creatinine 0.7 0.5 - 1.4 mg/dL    Calcium 8.3 (L) 8.7 - 10.5 mg/dL    Total Protein 6.2 6.0 - 8.4 g/dL    Albumin 2.7 (L) 3.5 - 5.2 g/dL    Total Bilirubin 1.0 0.1 - 1.0 mg/dL    Alkaline Phosphatase 99 55 - 135 U/L     (H) 10 - 40 U/L     (H) 10 - 44 U/L    eGFR >60.0 >60 mL/min/1.73 m^2    Anion Gap 9 8 - 16 mmol/L   Magnesium    Collection Time: 07/11/23  1:33 AM   Result Value Ref Range    Magnesium 1.6 1.6 - 2.6 mg/dL   Phosphorus    Collection Time: 07/11/23  1:33 AM   Result Value Ref Range    Phosphorus 1.4 (L) 2.7 - 4.5 mg/dL   CBC auto differential    Collection Time: 07/11/23  1:33 AM   Result Value Ref Range    WBC 5.23 3.90 - 12.70 K/uL    RBC 3.32 (L) 4.60 - 6.20 M/uL    Hemoglobin 10.2 (L) 14.0 - 18.0 g/dL    Hematocrit 29.9 (L) 40.0 - 54.0 %    MCV 90 82 - 98 fL    MCH 30.7 27.0 - 31.0 pg    MCHC 34.1 32.0 - 36.0 g/dL    RDW 14.2 11.5 - 14.5 %    Platelets 84 (L) 150 - 450 K/uL    MPV 10.7 9.2 - 12.9 fL    Immature Granulocytes 0.2 0.0 - 0.5 %    Gran # (ANC) 2.9 1.8 - 7.7 K/uL    Immature Grans (Abs) 0.01 0.00 - 0.04 K/uL    Lymph # 1.5 1.0 - 4.8 K/uL    Mono # 0.6 0.3 - 1.0 K/uL    Eos # 0.2 0.0 - 0.5 K/uL    Baso # 0.03 0.00 - 0.20 K/uL    nRBC 0 0 /100 WBC    Gran % 55.9 38.0 - 73.0 %    Lymph % 28.9 18.0 - 48.0 %    Mono % 11.1 4.0 - 15.0 %    Eosinophil % 3.3 0.0 - 8.0 %    Basophil % 0.6 0.0 - 1.9 %    Differential Method Automated      Imaging Results              CT Head Without Contrast (Final result)  Result time 07/08/23 14:59:26      Final result by Azeem Iqbal MD (07/08/23 14:59:26)                   Impression:      No acute intracranial process.  Additional evaluation, as clinically warranted.    Changes of chronic vessel ischemic disease and cerebral volume loss.    Motion limited examination.      Electronically signed by: Azeem Iqbal  MD  Date:    07/08/2023  Time:    14:59               Narrative:    EXAMINATION:  CT HEAD WITHOUT CONTRAST    CLINICAL HISTORY:  Mental status change, unknown cause;    TECHNIQUE:  Low dose axial images were obtained through the head.  Coronal and sagittal reformations were also performed. Contrast was not administered.    COMPARISON:  CT head dated 05/12/2023.    MRI of the brain dated 01/23/2023.    FINDINGS:  The subcutaneous tissues are unremarkable.  The bony calvarium is intact.  There is a chronic fracture involving the left orbital floor.  The paranasal sinuses are unremarkable.  The mastoid air cells are clear.  The orbits and intraorbital contents are within normal limits.    The craniocervical junction is intact.  The midline structures are unremarkable.  The ventricles and sulci are prominent, consistent cerebral volume loss.  There are extensive hypodensities within the periventricular and subcortical white matter.  The gray-white differentiation is maintained.  There is no dense vessel sign.  There is no evidence of mass effect.                                       X-Ray Chest AP Portable (Final result)  Result time 07/08/23 14:38:37      Final result by Mile Sanchez MD (07/08/23 14:38:37)                   Impression:      As above.      Electronically signed by: Mile Sanchez MD  Date:    07/08/2023  Time:    14:38               Narrative:    EXAMINATION:  XR CHEST AP PORTABLE    CLINICAL HISTORY:  Sepsis;    TECHNIQUE:  Single frontal view of the chest was performed.    COMPARISON:  01/22/2023    FINDINGS:  Central pulmonary vascular congestion with mild interstitial edema.  No consolidation or pleural effusions.  Heart size is normal.  Skeletal structures are intact.

## 2023-07-13 PROBLEM — F10.20 ALCOHOL USE DISORDER, SEVERE, DEPENDENCE: Status: ACTIVE | Noted: 2023-07-13

## 2023-07-13 PROBLEM — F10.931 DELIRIUM TREMENS: Status: ACTIVE | Noted: 2023-07-13

## 2023-07-13 PROBLEM — F19.90 SUBSTANCE USE DISORDER: Chronic | Status: ACTIVE | Noted: 2023-07-12

## 2023-07-13 PROBLEM — F14.90 COCAINE USE: Status: ACTIVE | Noted: 2023-07-13

## 2023-07-13 PROBLEM — R65.10 SIRS (SYSTEMIC INFLAMMATORY RESPONSE SYNDROME): Status: RESOLVED | Noted: 2023-07-09 | Resolved: 2023-07-13

## 2023-07-13 LAB
ALBUMIN SERPL BCP-MCNC: 2.7 G/DL (ref 3.5–5.2)
ALP SERPL-CCNC: 90 U/L (ref 55–135)
ALT SERPL W/O P-5'-P-CCNC: 78 U/L (ref 10–44)
ANION GAP SERPL CALC-SCNC: 12 MMOL/L (ref 8–16)
AST SERPL-CCNC: 116 U/L (ref 10–40)
BACTERIA BLD CULT: NORMAL
BACTERIA BLD CULT: NORMAL
BASOPHILS # BLD AUTO: 0.02 K/UL (ref 0–0.2)
BASOPHILS NFR BLD: 0.3 % (ref 0–1.9)
BILIRUB SERPL-MCNC: 0.9 MG/DL (ref 0.1–1)
BUN SERPL-MCNC: 7 MG/DL (ref 8–23)
CALCIUM SERPL-MCNC: 8.4 MG/DL (ref 8.7–10.5)
CHLORIDE SERPL-SCNC: 100 MMOL/L (ref 95–110)
CO2 SERPL-SCNC: 24 MMOL/L (ref 23–29)
CREAT SERPL-MCNC: 0.7 MG/DL (ref 0.5–1.4)
DIFFERENTIAL METHOD: ABNORMAL
EOSINOPHIL # BLD AUTO: 0.1 K/UL (ref 0–0.5)
EOSINOPHIL NFR BLD: 1.9 % (ref 0–8)
ERYTHROCYTE [DISTWIDTH] IN BLOOD BY AUTOMATED COUNT: 14.2 % (ref 11.5–14.5)
EST. GFR  (NO RACE VARIABLE): >60 ML/MIN/1.73 M^2
GLUCOSE SERPL-MCNC: 96 MG/DL (ref 70–110)
HCT VFR BLD AUTO: 28.9 % (ref 40–54)
HGB BLD-MCNC: 9.8 G/DL (ref 14–18)
IMM GRANULOCYTES # BLD AUTO: 0.02 K/UL (ref 0–0.04)
IMM GRANULOCYTES NFR BLD AUTO: 0.3 % (ref 0–0.5)
LYMPHOCYTES # BLD AUTO: 1.8 K/UL (ref 1–4.8)
LYMPHOCYTES NFR BLD: 27.8 % (ref 18–48)
MAGNESIUM SERPL-MCNC: 1.6 MG/DL (ref 1.6–2.6)
MCH RBC QN AUTO: 30.2 PG (ref 27–31)
MCHC RBC AUTO-ENTMCNC: 33.9 G/DL (ref 32–36)
MCV RBC AUTO: 89 FL (ref 82–98)
MONOCYTES # BLD AUTO: 1.3 K/UL (ref 0.3–1)
MONOCYTES NFR BLD: 21 % (ref 4–15)
NEUTROPHILS # BLD AUTO: 3.1 K/UL (ref 1.8–7.7)
NEUTROPHILS NFR BLD: 48.7 % (ref 38–73)
NRBC BLD-RTO: 0 /100 WBC
PHOSPHATE SERPL-MCNC: 2.3 MG/DL (ref 2.7–4.5)
PLATELET # BLD AUTO: 114 K/UL (ref 150–450)
PMV BLD AUTO: 10.5 FL (ref 9.2–12.9)
POTASSIUM SERPL-SCNC: 3.1 MMOL/L (ref 3.5–5.1)
PROT SERPL-MCNC: 6.2 G/DL (ref 6–8.4)
RBC # BLD AUTO: 3.24 M/UL (ref 4.6–6.2)
SODIUM SERPL-SCNC: 136 MMOL/L (ref 136–145)
WBC # BLD AUTO: 6.33 K/UL (ref 3.9–12.7)

## 2023-07-13 PROCEDURE — 84100 ASSAY OF PHOSPHORUS: CPT

## 2023-07-13 PROCEDURE — 25000003 PHARM REV CODE 250: Performed by: PSYCHIATRY & NEUROLOGY

## 2023-07-13 PROCEDURE — 80053 COMPREHEN METABOLIC PANEL: CPT

## 2023-07-13 PROCEDURE — 11000001 HC ACUTE MED/SURG PRIVATE ROOM

## 2023-07-13 PROCEDURE — 63600175 PHARM REV CODE 636 W HCPCS: Performed by: HOSPITALIST

## 2023-07-13 PROCEDURE — 99232 PR SUBSEQUENT HOSPITAL CARE,LEVL II: ICD-10-PCS | Mod: ,,, | Performed by: PSYCHIATRY & NEUROLOGY

## 2023-07-13 PROCEDURE — 85025 COMPLETE CBC W/AUTO DIFF WBC: CPT

## 2023-07-13 PROCEDURE — 36415 COLL VENOUS BLD VENIPUNCTURE: CPT

## 2023-07-13 PROCEDURE — 99233 PR SUBSEQUENT HOSPITAL CARE,LEVL III: ICD-10-PCS | Mod: ,,, | Performed by: HOSPITALIST

## 2023-07-13 PROCEDURE — 83735 ASSAY OF MAGNESIUM: CPT

## 2023-07-13 PROCEDURE — 25000003 PHARM REV CODE 250: Performed by: HOSPITALIST

## 2023-07-13 PROCEDURE — 99233 SBSQ HOSP IP/OBS HIGH 50: CPT | Mod: ,,, | Performed by: HOSPITALIST

## 2023-07-13 PROCEDURE — 25000003 PHARM REV CODE 250

## 2023-07-13 PROCEDURE — 99232 SBSQ HOSP IP/OBS MODERATE 35: CPT | Mod: ,,, | Performed by: PSYCHIATRY & NEUROLOGY

## 2023-07-13 RX ORDER — LORAZEPAM 2 MG/ML
1 INJECTION INTRAMUSCULAR ONCE
Status: DISCONTINUED | OUTPATIENT
Start: 2023-07-13 | End: 2023-07-14 | Stop reason: HOSPADM

## 2023-07-13 RX ORDER — LORAZEPAM 2 MG/ML
1 INJECTION INTRAMUSCULAR ONCE
Status: COMPLETED | OUTPATIENT
Start: 2023-07-13 | End: 2023-07-13

## 2023-07-13 RX ORDER — VALPROATE SODIUM 100 MG/ML
250 INJECTION INTRAVENOUS NIGHTLY
Status: DISCONTINUED | OUTPATIENT
Start: 2023-07-13 | End: 2023-07-13

## 2023-07-13 RX ORDER — DIAZEPAM 10 MG/2ML
10 INJECTION INTRAMUSCULAR EVERY 8 HOURS
Status: DISCONTINUED | OUTPATIENT
Start: 2023-07-13 | End: 2023-07-14

## 2023-07-13 RX ADMIN — SACUBITRIL AND VALSARTAN 1 TABLET: 24; 26 TABLET, FILM COATED ORAL at 08:07

## 2023-07-13 RX ADMIN — MUPIROCIN: 20 OINTMENT TOPICAL at 08:07

## 2023-07-13 RX ADMIN — POLYETHYLENE GLYCOL 3350 17 G: 17 POWDER, FOR SOLUTION ORAL at 08:07

## 2023-07-13 RX ADMIN — Medication 100 MG: at 08:07

## 2023-07-13 RX ADMIN — DEXTROSE 250 MG: 50 INJECTION, SOLUTION INTRAVENOUS at 09:07

## 2023-07-13 RX ADMIN — CLOPIDOGREL BISULFATE 75 MG: 75 TABLET ORAL at 08:07

## 2023-07-13 RX ADMIN — LORAZEPAM 1 MG: 2 INJECTION INTRAMUSCULAR; INTRAVENOUS at 02:07

## 2023-07-13 RX ADMIN — DIAZEPAM 10 MG: 5 INJECTION, SOLUTION INTRAMUSCULAR; INTRAVENOUS at 02:07

## 2023-07-13 RX ADMIN — ASPIRIN 81 MG 81 MG: 81 TABLET ORAL at 08:07

## 2023-07-13 RX ADMIN — SENNOSIDES AND DOCUSATE SODIUM 1 TABLET: 50; 8.6 TABLET ORAL at 08:07

## 2023-07-13 RX ADMIN — DIAZEPAM 10 MG: 5 INJECTION, SOLUTION INTRAMUSCULAR; INTRAVENOUS at 09:07

## 2023-07-13 RX ADMIN — FOLIC ACID 1 MG: 1 TABLET ORAL at 08:07

## 2023-07-13 NOTE — PROGRESS NOTES
CONSULTATION LIAISON PSYCHIATRY PROGRESS NOTE    Patient Name: Paty Jiménez  MRN: [id number]  Patient Class: IP- Inpatient  Admission Date: 7/8/2023  Attending Physician: Shukri Jeffrey MD      SUBJECTIVE:   Paty Jiménez is a 63 y.o. male with no known psychiatric history & past pertinent medical history of HTN, CAD, alcohol abuse, and Seizures presented to the ED/admitted to the hospital for Seizure. Per EMS, they were called out by family for patient having a possible unwitnessed seizure. Pt lives in his niece's guest house. Niece found him sitting on the couch around 10am sitting in urine and feces with his eyes rolled back and mouth bleeding. Niece said that this happens once every 1-2 months and pt is brought to the hospital with workups revealing seizures. The last seizures were in 5/2023 and 4/2023. Per EMS, patient was found post-ictal in a garage with no AC, temp on scene was documented as 103F. Pt's axillary temp was 101.6.     In the ED, he was not verbalizing or following any commands, and was intermittently extremely physically agitated/aggressive. Also with frequent, foul smelling diarrhea. Pt was found to be septic with tachycardia, increased RR, temp 103F. Started on antibiotics. Unclear when pt had his last drink. EtOH was undetectable c/f possible alcohol withdrawal or delirium tremens. He required multiple doses of mostly benzodiazepines to keep his agitation under control in the ED. On arrival, troponin trended up to 5.8 and EKG revealed STEMI.      Utox positive for cocaine, THC. Serum alcohol (-)     Psychiatry consulted for evaluation for suspected worsening mental status in setting of active EtOH w/d requiring patient to be PEC'd for grave disability.    Pt agitated and aggressive last night. Attempting to strike staff and leave. Required PRN depakote 250 mg PO @ 2315 yesterday. Later required restraints and ativan 1 mg IV @ 0200. Refused medications including valium this AM. CEC executed  this AM.     Today, pt is somnolent. Briefly awakens to verbal stimuli but otherwise unable to participate.        OBJECTIVE:    Mental Status Exam:  General Appearance: appears stated age, dressed in hospital garb, lying in bed  Behavior:  somnolent  Involuntary Movements and Motor Activity: no abnormal involuntary movements noted  Gait and Station: unable to assess - patient lying down or seated  Speech and Language:  unable to assess  Mood: unable to assess  Affect:  somnolent, unable to assess  Thought Process and Associations: Unable to Assess  Thought Content and Perceptions:: Unable to Assess  Sensorium and Orientation:  somnolent  Recent and Remote Memory: Unable to  Formally Assess  Attention and Concentration: Unable to Formally Assess  Fund of Knowledge: Unable to Formally Assess  Insight: limited/partial awareness of illness  Judgment: impaired due to delirium    CAM ICU positive? Unable to assess      ASSESSMENT & RECOMMENDATIONS     Alcohol use disorder, severe   R/o other substance use disorder (UDS +THC, cocaine)   Alcohol withdrawal  Delirium     PSYCH MEDICATIONS  Scheduled:   Start depakote 250 mg PO or IV qhs.  Continue valium taper for alcohol withdrawal per primary team  Continue folate and thiamine supplementation  PRN:  Continue depakote 250 mg PO or IV q8h prn non-redirectable agitation  Avoid antipsychotics as QTc 539 (7/9)     DELIRIUM BEHAVIOR MANAGEMENT  Continue to manage medical conditions and assess for and treat pain.  Please try to minimize the use of physical restraints, as they can worsen agitation; utilize PRN medications first.  Please avoid/minimize use of benzodiazepines (understand that may need to use BZD taper for EtOH withdrawal), anticholinergics, antihistamines (H1- and H2-blockers), and opioids when possible, as they may exacerbate delirium.  Please keep shades open and lights on during day and shades closed and lights off at night to encourage normal sleep/wake  cycle.  Reduce unnecessary stimulation/noise. TV screen and sound should be off unless patient is actively engaged w/ the program.  Encourage staff to reorient pt as needed throughout the day and to optimize pt's surroundings w/ an accurately updated calendar, and functioning clock.  Frequently remind pt of reason for hospitalization and the plan of care.  Attempt to maintain consistency in nursing staff.  Encourage family to be present as much as possible.   Correct sensory deficits, when present, with provision of the pt's eyeglasses and/or hearing aids.  Optimize nutrition and hydration status.  Recommend PT/OT consult. Early mobility and exercise have been shown to decrease duration of delirium     RISK ASSESSMENT  Continue CEC for grave disability & NEEDS 1:1 sitter     FOLLOW-UP  Will follow up while in house  Outpt referral to addiction psychiatry     DISPOSITION - once medically cleared:  Once delirium resolves, pt will likely not require inpt psych hospitalization.  Defer to medical team    Please contact ON CALL psychiatry service (24/7) for any acute issues that may arise.      Chadwick Langley MD  [facility], PGY-II      --------------------------------------------------------------------------------------------------------------------------------------------------------------------------------------------------------------------------------------    CONTINUED OBJECTIVE clinical data & findings reviewed and noted for above decision making    Current Medications:   Scheduled Meds:    aspirin  81 mg Oral Daily    clopidogreL  75 mg Oral Daily    diazePAM  10 mg Oral Q8H    folic acid  1 mg Oral Daily    lorazepam  1 mg Intravenous Once    mupirocin   Nasal BID    polyethylene glycol  17 g Oral Daily    potassium, sodium phosphates  2 packet Oral QID (AC & HS)    sacubitriL-valsartan  1 tablet Oral BID    senna-docusate 8.6-50 mg  1 tablet Oral BID    thiamine  100 mg Oral Daily     PRN Meds:  acetaminophen, acetaminophen, divalproex, sodium chloride 0.9%    Allergies:   Review of patient's allergies indicates:  No Known Allergies    Vitals  Vitals:    07/13/23 0735   BP: (!) 179/83   Pulse: 98   Resp: 18   Temp: 96.5 °F (35.8 °C)       Labs/Imaging/Studies:  Recent Results (from the past 24 hour(s))   Comprehensive metabolic panel    Collection Time: 07/13/23  4:28 AM   Result Value Ref Range    Sodium 136 136 - 145 mmol/L    Potassium 3.1 (L) 3.5 - 5.1 mmol/L    Chloride 100 95 - 110 mmol/L    CO2 24 23 - 29 mmol/L    Glucose 96 70 - 110 mg/dL    BUN 7 (L) 8 - 23 mg/dL    Creatinine 0.7 0.5 - 1.4 mg/dL    Calcium 8.4 (L) 8.7 - 10.5 mg/dL    Total Protein 6.2 6.0 - 8.4 g/dL    Albumin 2.7 (L) 3.5 - 5.2 g/dL    Total Bilirubin 0.9 0.1 - 1.0 mg/dL    Alkaline Phosphatase 90 55 - 135 U/L     (H) 10 - 40 U/L    ALT 78 (H) 10 - 44 U/L    eGFR >60.0 >60 mL/min/1.73 m^2    Anion Gap 12 8 - 16 mmol/L   Magnesium    Collection Time: 07/13/23  4:28 AM   Result Value Ref Range    Magnesium 1.6 1.6 - 2.6 mg/dL   Phosphorus    Collection Time: 07/13/23  4:28 AM   Result Value Ref Range    Phosphorus 2.3 (L) 2.7 - 4.5 mg/dL   CBC auto differential    Collection Time: 07/13/23  4:28 AM   Result Value Ref Range    WBC 6.33 3.90 - 12.70 K/uL    RBC 3.24 (L) 4.60 - 6.20 M/uL    Hemoglobin 9.8 (L) 14.0 - 18.0 g/dL    Hematocrit 28.9 (L) 40.0 - 54.0 %    MCV 89 82 - 98 fL    MCH 30.2 27.0 - 31.0 pg    MCHC 33.9 32.0 - 36.0 g/dL    RDW 14.2 11.5 - 14.5 %    Platelets 114 (L) 150 - 450 K/uL    MPV 10.5 9.2 - 12.9 fL    Immature Granulocytes 0.3 0.0 - 0.5 %    Gran # (ANC) 3.1 1.8 - 7.7 K/uL    Immature Grans (Abs) 0.02 0.00 - 0.04 K/uL    Lymph # 1.8 1.0 - 4.8 K/uL    Mono # 1.3 (H) 0.3 - 1.0 K/uL    Eos # 0.1 0.0 - 0.5 K/uL    Baso # 0.02 0.00 - 0.20 K/uL    nRBC 0 0 /100 WBC    Gran % 48.7 38.0 - 73.0 %    Lymph % 27.8 18.0 - 48.0 %    Mono % 21.0 (H) 4.0 - 15.0 %    Eosinophil % 1.9 0.0 - 8.0 %    Basophil % 0.3 0.0  - 1.9 %    Differential Method Automated      Imaging Results              CT Head Without Contrast (Final result)  Result time 07/08/23 14:59:26      Final result by Azeem Iqbal MD (07/08/23 14:59:26)                   Impression:      No acute intracranial process.  Additional evaluation, as clinically warranted.    Changes of chronic vessel ischemic disease and cerebral volume loss.    Motion limited examination.      Electronically signed by: Azeem Iqbal MD  Date:    07/08/2023  Time:    14:59               Narrative:    EXAMINATION:  CT HEAD WITHOUT CONTRAST    CLINICAL HISTORY:  Mental status change, unknown cause;    TECHNIQUE:  Low dose axial images were obtained through the head.  Coronal and sagittal reformations were also performed. Contrast was not administered.    COMPARISON:  CT head dated 05/12/2023.    MRI of the brain dated 01/23/2023.    FINDINGS:  The subcutaneous tissues are unremarkable.  The bony calvarium is intact.  There is a chronic fracture involving the left orbital floor.  The paranasal sinuses are unremarkable.  The mastoid air cells are clear.  The orbits and intraorbital contents are within normal limits.    The craniocervical junction is intact.  The midline structures are unremarkable.  The ventricles and sulci are prominent, consistent cerebral volume loss.  There are extensive hypodensities within the periventricular and subcortical white matter.  The gray-white differentiation is maintained.  There is no dense vessel sign.  There is no evidence of mass effect.                                       X-Ray Chest AP Portable (Final result)  Result time 07/08/23 14:38:37      Final result by Mile Sanchez MD (07/08/23 14:38:37)                   Impression:      As above.      Electronically signed by: Mile Sanchez MD  Date:    07/08/2023  Time:    14:38               Narrative:    EXAMINATION:  XR CHEST AP PORTABLE    CLINICAL HISTORY:  Sepsis;    TECHNIQUE:  Single frontal  view of the chest was performed.    COMPARISON:  01/22/2023    FINDINGS:  Central pulmonary vascular congestion with mild interstitial edema.  No consolidation or pleural effusions.  Heart size is normal.  Skeletal structures are intact.

## 2023-07-13 NOTE — NURSING
"Patient agitated and non-redirectable stating "I want to get the hell out this place" Patient flailing legs out of bed despite multiple attempts to make him stop. Patient also got out the bed and paced back and forth in room, with elevated voice. Patient suspicious of PRN Depakote 250mg to be administered PO, but eventually amenable.     "

## 2023-07-13 NOTE — ASSESSMENT & PLAN NOTE
Repeat echo in 2 weeks. On aspirin, clopidrogel, Entresto, need to add Jardiance. PharmD checking price on Jardiance.

## 2023-07-13 NOTE — NURSING
"Patient verbally and physically abusive towards sitter and nurse. Still making attempts to leave the room and go "over there". Patient not attending to redirection and got increasingly agitated and aggressive toward staff kicking and attempting to punch staff. Dr. Plummer contacted for orders - restraint and 1x dose of 1mg Ativan placed. Patient educated on restraint application rationale, factors to discontinue restraints and monitoring timeline.     Restraints placed and ativan administered without issue. Patient remains agitated and restless, but less aggressive at this time. WCTM.   "

## 2023-07-13 NOTE — ASSESSMENT & PLAN NOTE
Under PEC. Appreciate Psychiatry. Treating alcohol withdrawal. Depakote prn. Change diazepam to IV.

## 2023-07-13 NOTE — ASSESSMENT & PLAN NOTE
This patient does not have evidence of infective focus  My overall impression is SIRS uon admission due to seizure .  Cultures from 7/8 and 7/9 are negative  Antibiotics given-   Antibiotics (72h ago, onward)    Start     Stop Route Frequency Ordered    07/08/23 2145  mupirocin 2 % ointment         07/13 2059 Nasl 2 times daily 07/08/23 2039        Latest lactate reviewed-  Recent Labs   Lab 07/10/23  1258   LACTATE 2.2     STABLE

## 2023-07-13 NOTE — PROGRESS NOTES
[facility] - Neurosurgery ([facility] Medicine  Progress Note    Patient Name: Paty Jiménez  MRN: [id number]  Patient Class: IP- Inpatient   Admission Date: 7/8/2023  Length of Stay: 5 days  Attending Physician: Shukri Jeffrey MD  Primary Care Provider: To Obtain Unable        Subjective:     Principal Problem:Seizure        HPI:  Paty Jiménez is a 63 year old Black man with cigarette smoking, alcohol abuse (drinks 3 quarts of beer daily), history of alcohol withdrawal, seizures, coronary artery disease, liver cirrhosis, anemia, thrombocytopenia, cocaine use, hypertension. He lives in [place], [place].    He presented to [facility] Emergency Department on 7/8/2023 after having a seizure. He lives in his niece's guest house. He takes levetiracetam for his seizures. His niece found him sitting on the couch around 10:00 sitting in urine and feces with his eyes rolled back and mouth bleeding. His niece said this happens once every 1 to 2 months and he is brought to the hospital each time. His last seizures were in May and April. His niece's daughter checks on him daily to see if he is taking his levetiracetam, and he is mostly compliant. His niece said that she brought him water and he was able to drink it and speak close to his baseline. He was confused and combative on arrival to the emergency department. He did not have witnessed seizures there. He was oriented to self and moved purposefully. He was found to have tachycardia, tachypnea, fever of 103° Fahrenheit, elevated lactic acid (3.9 mmol/L) and procalcitonin (1.67 ng/mL, later 4.19). He was given rectal acetaminophen, vancomycin, piperacillin-tazobactam, acyclovir, ceftriaxone, and 2 liters of lactated Ringer's solution. Head CT showed no acute process. Lumbar puncture was done and showed glucose 82, protein 42, WBC 1. It was unclear when his last alcohol drink was. Ethanol level was undetectable. He required multiple doses of  mostly benzodiazepines to keep his agitation under control. EKG showed hyperacute T waves in anteroseptal leads and troponin was elevated at 1.7 ng/mL, then trended up to 4.7. He was given rectal aspirin and started on heparin drip. [facility] does not have a cardiac catheterization lab.    He was transferred to [facility] and admitted to Neuro Critical Care. Urine toxicology was positive for cocaine and marijuana, consistent with known history of use. He had rectal bleeding from an internal hemorrhoid after several medications were given and temperatures were taken rectally. Troponin trended up to 5.8 and EKG showed non-ST elevation myocardial infarction.        Overview/Hospital Course:  Echocardiogram showed mildly decreased systolic function, segmental left ventricular wall motion abnormalities suggestive of Takotsubo cardiomyopathy, but left anterior descending artery distribution ischemic heart disease could not be ruled out, left ventricular diastolic dysfunction, moderate left atrial enlargement, moderate tricuspid regurgitation, and pulmonary hypertension. On 7/10/2023, he was deemed okay for stepdown to Hospital Medicine. On 7/11/2023, heparin was stopped. He was put on olanzapine 5 mg twice daily. He was placed under PEC. Psychiatry was consulted. He was transferred to Hospital Medicine Team N on 7/12/2023. He was pacing in his room with paranoid delusions. Psychiatry started Depakote and diazepam taper and stopped phenobarbital.      Interval History: Spit out morning diazepam and electrolytes.    Review of Systems   Constitutional:  Negative for chills and fever.   Respiratory:  Negative for cough and shortness of breath.    Gastrointestinal:  Negative for nausea and vomiting.   Objective:     Vital Signs (Most Recent):  Temp: 96.5 °F (35.8 °C) (07/13/23 0735)  Pulse: 98 (07/13/23 0735)  Resp: 18 (07/13/23 0735)  BP: (!) 179/83 (07/13/23 0735)  SpO2: 97 % (07/12/23 2331)  Vital Signs (24h Range):  Temp:  [96.5 °F (35.8 °C)-98.3 °F (36.8 °C)] 96.5 °F (35.8 °C)  Pulse:  [] 98  Resp:  [17-18] 18  SpO2:  [97 %-100 %] 97 %  BP: (117-179)/(83-90) 179/83     Weight: 59.9 kg (132 lb)  Body mass index is 20.07 kg/m².  No intake or output data in the 24 hours ending 07/13/23 1022      Physical Exam  Vitals and nursing note reviewed.   Constitutional:       General: He is not in acute distress.     Appearance: He is well-developed and normal weight. He is not diaphoretic.   Pulmonary:      Effort: Pulmonary effort is normal. No respiratory distress.   Skin:     General: Skin is warm and dry.      Coloration: Skin is not jaundiced or pale.   Neurological:      Mental Status: He is alert and oriented to person, place, and time.      Motor: No seizure activity.   Psychiatric:         Attention and Perception: Attention normal.         Mood and Affect: Affect normal.         Behavior: Behavior is not aggressive or combative.           Significant Labs: All pertinent labs within the past 24 hours have been reviewed.    Significant Imaging: I have reviewed all pertinent imaging results/findings within the past 24 hours.  X-Ray Chest AP Portable 7/08/23: FINDINGS:   Central pulmonary vascular congestion with mild interstitial edema.  No consolidation or pleural effusions.  Heart size is normal.  Skeletal structures are intact.   CT Head Without Contrast 7/08/23: FINDINGS:   The subcutaneous tissues are unremarkable.  The bony calvarium is intact.  There is a chronic fracture involving the left orbital floor.  The paranasal sinuses are unremarkable.  The mastoid air cells are clear.  The orbits and intraorbital contents are within normal limits.   The craniocervical junction is intact.  The midline structures are unremarkable.  The ventricles and sulci are prominent, consistent cerebral volume loss.  There are extensive hypodensities within the periventricular and subcortical white matter.  The  gray-white differentiation is maintained.  There is no dense vessel sign.  There is no evidence of mass effect.   Impression:  No acute intracranial process.  Additional evaluation, as clinically warranted.   Changes of chronic vessel ischemic disease and cerebral volume loss.   Motion limited examination.   X-Ray Abdomen AP 1 View 7/08/23: FINDINGS:   Enteric tube courses below the diaphragm with tip projecting just right of midline over the anticipated region of the distal stomach.  No significantly dilated loops of bowel in the visualized upper abdomen.  No significant interval detrimental change in the radiographic appearance of visualized intrathoracic structures from prior chest radiograph 07/08/2023 at 14:30.   Transthoracic echo complete with contrast 7/09/23:    The left ventricle is normal in size with mildly decreased systolic function.   The estimated ejection fraction is 40%.   There are segmental left ventricular wall motion abnormalities suggestive of Takotsubo cardiomyopathy, but LAD distribution ischemic heart disease cannot be ruled out.   Grade II left ventricular diastolic dysfunction.   Normal right ventricular size with normal right ventricular systolic function.   Moderate left atrial enlargement.   Mild mitral regurgitation.   Moderate tricuspid regurgitation.   The estimated PA systolic pressure is 47 mmHg.   There is pulmonary hypertension.   Normal central venous pressure (3 mmHg).      Assessment/Plan:      * Seizure  He takes levetiracetam. Seizure precautions.    Substance use disorder  Urine toxicology positive for cocaine and THC.     Encephalopathy acute  Under PEC. Appreciate Psychiatry. Treating alcohol withdrawal. Depakote prn. Change diazepam to IV.    STEMI (ST elevation myocardial infarction)  Repeat echo in 2 weeks. On aspirin, clopidrogel, Entresto, need to add Jardiance. PharmD checking price on Jardiance.    Elevated troponin  See STEMI    HTN (hypertension)  SBP  <160  7/12- BP  134/91   Pulse 107    Tobacco use disorder  Nicotine patch prn              Alcohol abuse  Giving thiamine, folate, diazepam taper.        VTE Risk Mitigation (From admission, onward)         Ordered     Place RADHA hose  Until discontinued         07/08/23 2007     IP VTE LOW RISK PATIENT  Once         07/08/23 2007                Discharge Planning   BROOKE: 7/17/2023     Code Status: Full Code   Is the patient medically ready for discharge?: No    Reason for patient still in hospital (select all that apply): Patient trending condition and Treatment  Discharge Plan A: Home with family                  Shukri Jeffrey MD  Department of Hospital Medicine   [facility] - Neurosurgery ([facility])

## 2023-07-13 NOTE — NURSING
"Patient refused to take 6am scheduled PO meds - Diazepam and K+/Na+ phosphates. After attempting to talk with patient about the importance of taking the medication, he took the diazepam but spat the pills out shortly. States "I'm not taking any more medicines anyone gives to me."   "

## 2023-07-13 NOTE — SUBJECTIVE & OBJECTIVE
Interval History: Spit out morning diazepam and electrolytes.    Review of Systems   Constitutional:  Negative for chills and fever.   Respiratory:  Negative for cough and shortness of breath.    Gastrointestinal:  Negative for nausea and vomiting.   Objective:     Vital Signs (Most Recent):  Temp: 96.5 °F (35.8 °C) (07/13/23 0735)  Pulse: 98 (07/13/23 0735)  Resp: 18 (07/13/23 0735)  BP: (!) 179/83 (07/13/23 0735)  SpO2: 97 % (07/12/23 2331) Vital Signs (24h Range):  Temp:  [96.5 °F (35.8 °C)-98.3 °F (36.8 °C)] 96.5 °F (35.8 °C)  Pulse:  [] 98  Resp:  [17-18] 18  SpO2:  [97 %-100 %] 97 %  BP: (117-179)/(83-90) 179/83     Weight: 59.9 kg (132 lb)  Body mass index is 20.07 kg/m².  No intake or output data in the 24 hours ending 07/13/23 1022      Physical Exam  Vitals and nursing note reviewed.   Constitutional:       General: He is not in acute distress.     Appearance: He is well-developed and normal weight. He is not diaphoretic.   Pulmonary:      Effort: Pulmonary effort is normal. No respiratory distress.   Skin:     General: Skin is warm and dry.      Coloration: Skin is not jaundiced or pale.   Neurological:      Mental Status: He is alert and oriented to person, place, and time.      Motor: No seizure activity.   Psychiatric:         Attention and Perception: Attention normal.         Mood and Affect: Affect normal.         Behavior: Behavior is not aggressive or combative.           Significant Labs: All pertinent labs within the past 24 hours have been reviewed.    Significant Imaging: I have reviewed all pertinent imaging results/findings within the past 24 hours.  X-Ray Chest AP Portable 7/08/23: FINDINGS:   Central pulmonary vascular congestion with mild interstitial edema.  No consolidation or pleural effusions.  Heart size is normal.  Skeletal structures are intact.   CT Head Without Contrast 7/08/23: FINDINGS:   The subcutaneous tissues are unremarkable.  The bony calvarium is intact.  There is  a chronic fracture involving the left orbital floor.  The paranasal sinuses are unremarkable.  The mastoid air cells are clear.  The orbits and intraorbital contents are within normal limits.   The craniocervical junction is intact.  The midline structures are unremarkable.  The ventricles and sulci are prominent, consistent cerebral volume loss.  There are extensive hypodensities within the periventricular and subcortical white matter.  The gray-white differentiation is maintained.  There is no dense vessel sign.  There is no evidence of mass effect.   Impression:  No acute intracranial process.  Additional evaluation, as clinically warranted.   Changes of chronic vessel ischemic disease and cerebral volume loss.   Motion limited examination.   X-Ray Abdomen AP 1 View 7/08/23: FINDINGS:   Enteric tube courses below the diaphragm with tip projecting just right of midline over the anticipated region of the distal stomach.  No significantly dilated loops of bowel in the visualized upper abdomen.  No significant interval detrimental change in the radiographic appearance of visualized intrathoracic structures from prior chest radiograph 07/08/2023 at 14:30.   Transthoracic echo complete with contrast 7/09/23:   The left ventricle is normal in size with mildly decreased systolic function.  The estimated ejection fraction is 40%.  There are segmental left ventricular wall motion abnormalities suggestive of Takotsubo cardiomyopathy, but LAD distribution ischemic heart disease cannot be ruled out.  Grade II left ventricular diastolic dysfunction.  Normal right ventricular size with normal right ventricular systolic function.  Moderate left atrial enlargement.  Mild mitral regurgitation.  Moderate tricuspid regurgitation.  The estimated PA systolic pressure is 47 mmHg.  There is pulmonary hypertension.  Normal central venous pressure (3 mmHg).

## 2023-07-13 NOTE — PLAN OF CARE
Problem: Infection  Goal: Absence of Infection Signs and Symptoms  Outcome: Ongoing, Progressing     Problem: Adult Inpatient Plan of Care  Goal: Plan of Care Review  Outcome: Ongoing, Progressing  Goal: Patient-Specific Goal (Individualized)  Outcome: Ongoing, Progressing  Goal: Absence of Hospital-Acquired Illness or Injury  Outcome: Ongoing, Progressing  Goal: Optimal Comfort and Wellbeing  Outcome: Ongoing, Progressing  Goal: Readiness for Transition of Care  Outcome: Ongoing, Progressing     Problem: Skin Injury Risk Increased  Goal: Skin Health and Integrity  Outcome: Ongoing, Progressing     Problem: Impaired Wound Healing  Goal: Optimal Wound Healing  Outcome: Ongoing, Progressing     Problem: Adjustment to Illness (Sepsis/Septic Shock)  Goal: Optimal Coping  Outcome: Ongoing, Progressing     Problem: Bleeding (Sepsis/Septic Shock)  Goal: Absence of Bleeding  Outcome: Ongoing, Progressing     Problem: Glycemic Control Impaired (Sepsis/Septic Shock)  Goal: Blood Glucose Level Within Desired Range  Outcome: Ongoing, Progressing     Problem: Infection Progression (Sepsis/Septic Shock)  Goal: Absence of Infection Signs and Symptoms  Outcome: Ongoing, Progressing     Problem: Nutrition Impaired (Sepsis/Septic Shock)  Goal: Optimal Nutrition Intake  Outcome: Ongoing, Progressing     Problem: Fall Injury Risk  Goal: Absence of Fall and Fall-Related Injury  Outcome: Ongoing, Progressing     Problem: Restraint, Nonbehavioral (Nonviolent)  Goal: Absence of Harm or Injury  Outcome: Ongoing, Progressing     Problem: Seizure, Active Management  Goal: Absence of Seizure/Seizure-Related Injury  Outcome: Ongoing, Progressing    POC reviewed with the patient and they verbalized understanding. All comments and concerns addressed. Bed locked in lowest position with bed alarm set, call light within reach. Safety precautions maintained. VSS, see flowsheets. Patient had episodes of agitation and aggression toward staff - see  notes for more info. Will continue to monitor for changes to POC and clinical condition.

## 2023-07-14 VITALS
RESPIRATION RATE: 18 BRPM | BODY MASS INDEX: 20 KG/M2 | HEIGHT: 68 IN | TEMPERATURE: 97 F | SYSTOLIC BLOOD PRESSURE: 106 MMHG | HEART RATE: 81 BPM | OXYGEN SATURATION: 99 % | WEIGHT: 132 LBS | DIASTOLIC BLOOD PRESSURE: 58 MMHG

## 2023-07-14 PROBLEM — G93.40 ENCEPHALOPATHY ACUTE: Status: RESOLVED | Noted: 2023-07-09 | Resolved: 2023-07-14

## 2023-07-14 PROBLEM — G40.909 SEIZURE DISORDER: Chronic | Status: ACTIVE | Noted: 2023-01-22

## 2023-07-14 PROBLEM — F10.931 DELIRIUM TREMENS: Status: RESOLVED | Noted: 2023-07-13 | Resolved: 2023-07-14

## 2023-07-14 LAB
ALBUMIN SERPL BCP-MCNC: 2.7 G/DL (ref 3.5–5.2)
ALP SERPL-CCNC: 88 U/L (ref 55–135)
ALT SERPL W/O P-5'-P-CCNC: 75 U/L (ref 10–44)
AMPA-R AB CBA, SERUM: NORMAL
AMPHIPHYSIN AB TITR SER: NORMAL {TITER}
ANION GAP SERPL CALC-SCNC: 13 MMOL/L (ref 8–16)
ANISOCYTOSIS BLD QL SMEAR: SLIGHT
ANNOTATION COMMENT IMP: NORMAL
AST SERPL-CCNC: 110 U/L (ref 10–40)
BACTERIA BLD CULT: NORMAL
BACTERIA BLD CULT: NORMAL
BASOPHILS # BLD AUTO: 0.03 K/UL (ref 0–0.2)
BASOPHILS NFR BLD: 0.4 % (ref 0–1.9)
BILIRUB SERPL-MCNC: 0.9 MG/DL (ref 0.1–1)
BUN SERPL-MCNC: 7 MG/DL (ref 8–23)
CALCIUM SERPL-MCNC: 8.3 MG/DL (ref 8.7–10.5)
CASPR2-IGG CBA: NORMAL
CHLORIDE SERPL-SCNC: 101 MMOL/L (ref 95–110)
CO2 SERPL-SCNC: 25 MMOL/L (ref 23–29)
CREAT SERPL-MCNC: 0.8 MG/DL (ref 0.5–1.4)
CV2 IGG TITR SER: NORMAL {TITER}
DIFFERENTIAL METHOD: ABNORMAL
DPPX IGG SERPL QL IF: NORMAL
EOSINOPHIL # BLD AUTO: 0.1 K/UL (ref 0–0.5)
EOSINOPHIL NFR BLD: 1.7 % (ref 0–8)
ERYTHROCYTE [DISTWIDTH] IN BLOOD BY AUTOMATED COUNT: 14.9 % (ref 11.5–14.5)
EST. GFR  (NO RACE VARIABLE): >60 ML/MIN/1.73 M^2
GABA-B-R AB CBA, SERUM: NORMAL
GAD65 AB SER-SCNC: NORMAL NMOL/L
GFAP IFA, SERUM: NORMAL
GLIAL NUC TYPE 1 AB TITR SER: NORMAL {TITER}
GLUCOSE SERPL-MCNC: 82 MG/DL (ref 70–110)
HCT VFR BLD AUTO: 30.1 % (ref 40–54)
HGB BLD-MCNC: 10.3 G/DL (ref 14–18)
HU1 AB TITR SER: NORMAL {TITER}
HU2 AB TITR SER IF: NORMAL {TITER}
HU3 AB TITR SER: NORMAL {TITER}
HYPOCHROMIA BLD QL SMEAR: ABNORMAL
IGLON5 IFA, S: NORMAL
IMM GRANULOCYTES # BLD AUTO: 0.02 K/UL (ref 0–0.04)
IMM GRANULOCYTES NFR BLD AUTO: 0.3 % (ref 0–0.5)
IMMUNOLOGIST REVIEW: NORMAL
LGI1-IGG CBA: NORMAL
LYMPHOCYTES # BLD AUTO: 2.8 K/UL (ref 1–4.8)
LYMPHOCYTES NFR BLD: 40 % (ref 18–48)
M SEPTIN-7 IFA, S: NORMAL
MAGNESIUM SERPL-MCNC: 1.7 MG/DL (ref 1.6–2.6)
MCH RBC QN AUTO: 31 PG (ref 27–31)
MCHC RBC AUTO-ENTMCNC: 34.2 G/DL (ref 32–36)
MCV RBC AUTO: 91 FL (ref 82–98)
MGLUR1 AB IFA, SERUM: NORMAL
MONOCYTES # BLD AUTO: 1.2 K/UL (ref 0.3–1)
MONOCYTES NFR BLD: 17.9 % (ref 4–15)
NEUROCHONDRIN, IFA, S: NORMAL
NEUTROPHILS # BLD AUTO: 2.7 K/UL (ref 1.8–7.7)
NEUTROPHILS NFR BLD: 39.7 % (ref 38–73)
NIF IFA, S: NORMAL
NMDA-R AB CBA, SERUM: NORMAL
NRBC BLD-RTO: 0 /100 WBC
PCA-1 AB TITR SER: NORMAL {TITER}
PCA-2 AB TITR SER: NORMAL {TITER}
PCA-TR AB TITR SER: NORMAL {TITER}
PHOSPHATE SERPL-MCNC: 3.6 MG/DL (ref 2.7–4.5)
PLATELET # BLD AUTO: 151 K/UL (ref 150–450)
PLATELET BLD QL SMEAR: ABNORMAL
PMV BLD AUTO: 10.6 FL (ref 9.2–12.9)
POIKILOCYTOSIS BLD QL SMEAR: SLIGHT
POLYCHROMASIA BLD QL SMEAR: ABNORMAL
POTASSIUM SERPL-SCNC: 3.3 MMOL/L (ref 3.5–5.1)
PROT SERPL-MCNC: 6.7 G/DL (ref 6–8.4)
RBC # BLD AUTO: 3.32 M/UL (ref 4.6–6.2)
SODIUM SERPL-SCNC: 139 MMOL/L (ref 136–145)
TARGETS BLD QL SMEAR: ABNORMAL
WBC # BLD AUTO: 6.88 K/UL (ref 3.9–12.7)

## 2023-07-14 PROCEDURE — 83735 ASSAY OF MAGNESIUM: CPT

## 2023-07-14 PROCEDURE — 25000003 PHARM REV CODE 250: Performed by: HOSPITALIST

## 2023-07-14 PROCEDURE — 99232 PR SUBSEQUENT HOSPITAL CARE,LEVL II: ICD-10-PCS | Mod: ,,, | Performed by: PSYCHIATRY & NEUROLOGY

## 2023-07-14 PROCEDURE — 99232 SBSQ HOSP IP/OBS MODERATE 35: CPT | Mod: ,,, | Performed by: PSYCHIATRY & NEUROLOGY

## 2023-07-14 PROCEDURE — 99239 HOSP IP/OBS DSCHRG MGMT >30: CPT | Mod: ,,, | Performed by: HOSPITALIST

## 2023-07-14 PROCEDURE — 36415 COLL VENOUS BLD VENIPUNCTURE: CPT

## 2023-07-14 PROCEDURE — 25000003 PHARM REV CODE 250: Performed by: PSYCHIATRY & NEUROLOGY

## 2023-07-14 PROCEDURE — 85025 COMPLETE CBC W/AUTO DIFF WBC: CPT

## 2023-07-14 PROCEDURE — 25000003 PHARM REV CODE 250

## 2023-07-14 PROCEDURE — 84100 ASSAY OF PHOSPHORUS: CPT

## 2023-07-14 PROCEDURE — 99239 PR HOSPITAL DISCHARGE DAY,>30 MIN: ICD-10-PCS | Mod: ,,, | Performed by: HOSPITALIST

## 2023-07-14 PROCEDURE — 63600175 PHARM REV CODE 636 W HCPCS: Performed by: HOSPITALIST

## 2023-07-14 PROCEDURE — 80053 COMPREHEN METABOLIC PANEL: CPT

## 2023-07-14 RX ORDER — CLOPIDOGREL BISULFATE 75 MG/1
75 TABLET ORAL DAILY
Qty: 30 TABLET | Refills: 11 | Status: SHIPPED | OUTPATIENT
Start: 2023-07-15 | End: 2024-07-14

## 2023-07-14 RX ORDER — DIAZEPAM 5 MG/1
TABLET ORAL
Qty: 7 TABLET | Refills: 0 | Status: SHIPPED | OUTPATIENT
Start: 2023-07-14 | End: 2023-07-17

## 2023-07-14 RX ORDER — LANOLIN ALCOHOL/MO/W.PET/CERES
100 CREAM (GRAM) TOPICAL DAILY
Qty: 30 TABLET | Refills: 0 | Status: SHIPPED | OUTPATIENT
Start: 2023-07-15 | End: 2023-08-14

## 2023-07-14 RX ORDER — DIAZEPAM 10 MG/2ML
10 INJECTION INTRAMUSCULAR 2 TIMES DAILY
Status: DISCONTINUED | OUTPATIENT
Start: 2023-07-14 | End: 2023-07-14 | Stop reason: HOSPADM

## 2023-07-14 RX ORDER — NAPROXEN SODIUM 220 MG/1
81 TABLET, FILM COATED ORAL DAILY
Qty: 30 TABLET | Refills: 11 | Status: SHIPPED | OUTPATIENT
Start: 2023-07-15 | End: 2024-07-14

## 2023-07-14 RX ADMIN — CLOPIDOGREL BISULFATE 75 MG: 75 TABLET ORAL at 09:07

## 2023-07-14 RX ADMIN — ASPIRIN 81 MG 81 MG: 81 TABLET ORAL at 09:07

## 2023-07-14 RX ADMIN — FOLIC ACID 1 MG: 1 TABLET ORAL at 09:07

## 2023-07-14 RX ADMIN — Medication 100 MG: at 09:07

## 2023-07-14 RX ADMIN — POTASSIUM & SODIUM PHOSPHATES POWDER PACK 280-160-250 MG 2 PACKET: 280-160-250 PACK at 11:07

## 2023-07-14 RX ADMIN — DIAZEPAM 10 MG: 5 INJECTION, SOLUTION INTRAMUSCULAR; INTRAVENOUS at 06:07

## 2023-07-14 RX ADMIN — SACUBITRIL AND VALSARTAN 1 TABLET: 24; 26 TABLET, FILM COATED ORAL at 09:07

## 2023-07-14 NOTE — PLAN OF CARE
Patient has d/c orders. CM saw patient in room.. Patient eating lunch. Patient states he is ready to go home. Cm asked about transportation home. Patient states that his son will be here in an hour.  Maureen Mims RN

## 2023-07-14 NOTE — PROGRESS NOTES
CONSULTATION LIAISON PSYCHIATRY PROGRESS NOTE    Patient Name: Paty Jiménez  MRN: [id number]  Patient Class: IP- Inpatient  Admission Date: 7/8/2023  Attending Physician: Shukri Jeffrey MD      SUBJECTIVE:   Paty Jiménez is a 63 y.o. male with no known psychiatric history & past pertinent medical history of HTN, CAD, alcohol abuse, and Seizures presented to the ED/admitted to the hospital for Seizure. Per EMS, they were called out by family for patient having a possible unwitnessed seizure. Pt lives in his niece's guest house. Niece found him sitting on the couch around 10am sitting in urine and feces with his eyes rolled back and mouth bleeding. Niece said that this happens once every 1-2 months and pt is brought to the hospital with workups revealing seizures. The last seizures were in 5/2023 and 4/2023. Per EMS, patient was found post-ictal in a garage with no AC, temp on scene was documented as 103F. Pt's axillary temp was 101.6.     In the ED, he was not verbalizing or following any commands, and was intermittently extremely physically agitated/aggressive. Also with frequent, foul smelling diarrhea. Pt was found to be septic with tachycardia, increased RR, temp 103F. Started on antibiotics. Unclear when pt had his last drink. EtOH was undetectable c/f possible alcohol withdrawal or delirium tremens. He required multiple doses of mostly benzodiazepines to keep his agitation under control in the ED. On arrival, troponin trended up to 5.8 and EKG revealed STEMI.      Utox positive for cocaine, THC. Serum alcohol (-)    Psychiatry consulted for evaluation for suspected worsening mental status in setting of active EtOH w/d requiring patient to be PEC'd for grave disability.     Chart reviewed and patient seen. Patient did not require any behavioral PRN overnight. Today, patient seen resting in bed. A&O x 3. He states he is feeling better and has experienced minimal withdrawal sx. He slept well overnight. Shares  "plan to continue sobriety once outside hospital and is open to receiving resources for support in outpatient setting. Reports motivators for staying sober including wanting to see his two grandchildren graduate. Denies SI/HI/AVH. No new complaints at this time.        OBJECTIVE:    Mental Status Exam:  General Appearance: appears stated age, well-developed, dressed in hospital garb, lying in bed  Behavior: normal, cooperative, friendly, pleasant  Involuntary Movements and Motor Activity: no abnormal involuntary movements noted; no tics, no tremors, no akathisia, no dystonia, no evidence of tardive dyskinesia; no psychomotor agitation or retardation  Gait and Station: ambulates without assistance  Speech and Language: intact; normal rate, rhythm, volume, tone, and pitch; conversational, spontaneous, and coherent; speaks and understands English proficiently and fluently; repeats words and phrases, no word finding difficulties are noted  Mood: "good"  Affect: reactive, appropriate to situation and context, mood-congruent  Thought Process and Associations: intact; linear, goal-directed, organized, and logical; no loosening of associations noted  Thought Content and Perceptions:: no suicidal or homicidal ideation, no auditory or visual hallucinations, no paranoid ideation, no ideas of reference, no evidence of delusions or psychosis  Sensorium and Orientation: alert with clear sensorium, alert, oriented fully (to person, place, time and situation)  Recent and Remote Memory: grossly intact, able to recall relevant and salient information from the recent and remote past  Attention and Concentration: grossly intact, attentive to the conversation and not readily distractible  Fund of Knowledge: grossly intact, aware of current events, vocabulary appropriate, consistent with educational level achieved  Insight: intact, demonstrates awareness of illness and situation  Judgment: intact, behavior is adequate/appropriate to the " circumstances, compliant with health provider's recommendations and instructions    CAM ICU positive? no    EKG with QTc 539 ms on 7/9/23.     ASSESSMENT & RECOMMENDATIONS   Alcohol use disorder, severe   R/o other substance use disorder (UDS +THC, cocaine)   Alcohol withdrawal  Delirium     PSYCH MEDICATIONS  Scheduled:   Can discontinue depakote 250 mg PO or IV qhs  Continue valium taper for alcohol withdrawal per primary team  Patient has already received Valium 10 mg this morning; would recommend patient receive 2 additional doses of Valium 5 mg today  Saturday: Valium 5 mg PO TID  Sunday: Valium 5 mg PO BID  Continue folate and thiamine supplementation  PRN:  Continue depakote 250 mg PO or IV q8h prn non-redirectable agitation  Avoid antipsychotics as QTc 539 (7/9)     DELIRIUM BEHAVIOR MANAGEMENT  Continue to manage medical conditions and assess for and treat pain.  Please try to minimize the use of physical restraints, as they can worsen agitation; utilize PRN medications first.  Please avoid/minimize use of benzodiazepines (understand that may need to use BZD taper for EtOH withdrawal), anticholinergics, antihistamines (H1- and H2-blockers), and opioids when possible, as they may exacerbate delirium.  Please keep shades open and lights on during day and shades closed and lights off at night to encourage normal sleep/wake cycle.  Reduce unnecessary stimulation/noise. TV screen and sound should be off unless patient is actively engaged w/ the program.  Encourage staff to reorient pt as needed throughout the day and to optimize pt's surroundings w/ an accurately updated calendar, and functioning clock.  Frequently remind pt of reason for hospitalization and the plan of care.  Attempt to maintain consistency in nursing staff.  Encourage family to be present as much as possible.   Correct sensory deficits, when present, with provision of the pt's eyeglasses and/or hearing aids.  Optimize nutrition and hydration  status.  Recommend PT/OT consult. Early mobility and exercise have been shown to decrease duration of delirium     RISK ASSESSMENT  Would recommend rescinding CEC as patient is no longer a risk to himself/others or gravely disabled.      FOLLOW-UP  Will sign off. Patient can follow up with outpatient Addiction Psychiatry provider. Resources provided in patient's discharge instructions.     DISPOSITION - once medically cleared:  Patient may be discharged home with next of kin with outpatient psychaitric follow up/rehab.     Please contact ON CALL psychiatry service (24/7) for any acute issues that may arise.    Dr. Arielle Frias   Psychiatry  [facility]  7/14/2023 9:10 AM        --------------------------------------------------------------------------------------------------------------------------------------------------------------------------------------------------------------------------------------    CONTINUED OBJECTIVE clinical data & findings reviewed and noted for above decision making    Current Medications:   Scheduled Meds:    aspirin  81 mg Oral Daily    clopidogreL  75 mg Oral Daily    diazePAM  10 mg Intravenous Q8H    folic acid  1 mg Oral Daily    lorazepam  1 mg Intravenous Once    polyethylene glycol  17 g Oral Daily    potassium, sodium phosphates  2 packet Oral QID (AC & HS)    sacubitriL-valsartan  1 tablet Oral BID    senna-docusate 8.6-50 mg  1 tablet Oral BID    thiamine  100 mg Oral Daily    valproate sodium (DEPACON) IVPB  250 mg Intravenous QHS     PRN Meds: acetaminophen, acetaminophen, divalproex, sodium chloride 0.9%    Allergies:   Review of patient's allergies indicates:  No Known Allergies    Vitals  Vitals:    07/14/23 0715   BP: 123/81   Pulse: 76   Resp: 18   Temp: 96.1 °F (35.6 °C)       Labs/Imaging/Studies:  Recent Results (from the past 24 hour(s))   Comprehensive metabolic panel    Collection Time: 07/14/23  4:17 AM   Result Value Ref Range    Sodium  139 136 - 145 mmol/L    Potassium 3.3 (L) 3.5 - 5.1 mmol/L    Chloride 101 95 - 110 mmol/L    CO2 25 23 - 29 mmol/L    Glucose 82 70 - 110 mg/dL    BUN 7 (L) 8 - 23 mg/dL    Creatinine 0.8 0.5 - 1.4 mg/dL    Calcium 8.3 (L) 8.7 - 10.5 mg/dL    Total Protein 6.7 6.0 - 8.4 g/dL    Albumin 2.7 (L) 3.5 - 5.2 g/dL    Total Bilirubin 0.9 0.1 - 1.0 mg/dL    Alkaline Phosphatase 88 55 - 135 U/L     (H) 10 - 40 U/L    ALT 75 (H) 10 - 44 U/L    eGFR >60.0 >60 mL/min/1.73 m^2    Anion Gap 13 8 - 16 mmol/L   Magnesium    Collection Time: 07/14/23  4:17 AM   Result Value Ref Range    Magnesium 1.7 1.6 - 2.6 mg/dL   Phosphorus    Collection Time: 07/14/23  4:17 AM   Result Value Ref Range    Phosphorus 3.6 2.7 - 4.5 mg/dL   CBC auto differential    Collection Time: 07/14/23  4:17 AM   Result Value Ref Range    WBC 6.88 3.90 - 12.70 K/uL    RBC 3.32 (L) 4.60 - 6.20 M/uL    Hemoglobin 10.3 (L) 14.0 - 18.0 g/dL    Hematocrit 30.1 (L) 40.0 - 54.0 %    MCV 91 82 - 98 fL    MCH 31.0 27.0 - 31.0 pg    MCHC 34.2 32.0 - 36.0 g/dL    RDW 14.9 (H) 11.5 - 14.5 %    Platelets 151 150 - 450 K/uL    MPV 10.6 9.2 - 12.9 fL    Immature Granulocytes 0.3 0.0 - 0.5 %    Gran # (ANC) 2.7 1.8 - 7.7 K/uL    Immature Grans (Abs) 0.02 0.00 - 0.04 K/uL    Lymph # 2.8 1.0 - 4.8 K/uL    Mono # 1.2 (H) 0.3 - 1.0 K/uL    Eos # 0.1 0.0 - 0.5 K/uL    Baso # 0.03 0.00 - 0.20 K/uL    nRBC 0 0 /100 WBC    Gran % 39.7 38.0 - 73.0 %    Lymph % 40.0 18.0 - 48.0 %    Mono % 17.9 (H) 4.0 - 15.0 %    Eosinophil % 1.7 0.0 - 8.0 %    Basophil % 0.4 0.0 - 1.9 %    Platelet Estimate Appears normal     Aniso Slight     Poik Slight     Poly Occasional     Hypo Occasional     Target Cells Occasional     Differential Method Automated      Imaging Results              CT Head Without Contrast (Final result)  Result time 07/08/23 14:59:26      Final result by Azeem Iqbal MD (07/08/23 14:59:26)                   Impression:      No acute intracranial process.  Additional  evaluation, as clinically warranted.    Changes of chronic vessel ischemic disease and cerebral volume loss.    Motion limited examination.      Electronically signed by: Azeem Iqbal MD  Date:    07/08/2023  Time:    14:59               Narrative:    EXAMINATION:  CT HEAD WITHOUT CONTRAST    CLINICAL HISTORY:  Mental status change, unknown cause;    TECHNIQUE:  Low dose axial images were obtained through the head.  Coronal and sagittal reformations were also performed. Contrast was not administered.    COMPARISON:  CT head dated 05/12/2023.    MRI of the brain dated 01/23/2023.    FINDINGS:  The subcutaneous tissues are unremarkable.  The bony calvarium is intact.  There is a chronic fracture involving the left orbital floor.  The paranasal sinuses are unremarkable.  The mastoid air cells are clear.  The orbits and intraorbital contents are within normal limits.    The craniocervical junction is intact.  The midline structures are unremarkable.  The ventricles and sulci are prominent, consistent cerebral volume loss.  There are extensive hypodensities within the periventricular and subcortical white matter.  The gray-white differentiation is maintained.  There is no dense vessel sign.  There is no evidence of mass effect.                                       X-Ray Chest AP Portable (Final result)  Result time 07/08/23 14:38:37      Final result by Mile Sanchez MD (07/08/23 14:38:37)                   Impression:      As above.      Electronically signed by: Mile Sanchez MD  Date:    07/08/2023  Time:    14:38               Narrative:    EXAMINATION:  XR CHEST AP PORTABLE    CLINICAL HISTORY:  Sepsis;    TECHNIQUE:  Single frontal view of the chest was performed.    COMPARISON:  01/22/2023    FINDINGS:  Central pulmonary vascular congestion with mild interstitial edema.  No consolidation or pleural effusions.  Heart size is normal.  Skeletal structures are intact.

## 2023-07-14 NOTE — DISCHARGE SUMMARY
[facility]ai [facility] - Neurosurgery ([facility])  [facility] Medicine  Discharge Summary      Patient Name: Paty Jiménez  MRN: [id number]  [facility]: [id number]  Patient Class: IP- Inpatient  Admission Date: 7/8/2023  Hospital Length of Stay: 6 days  Discharge Date and Time: 7/14/2023  2:56 PM  Attending Physician: Shukri Jeffrey MD   Discharging Provider: Shukri Jeffrey MD  Primary Care Provider: To Obtain Unable  [place]ospital Medicine [place] [facility] Shukri Jeffrey MD  Primary Care Team[place] [facility]    HPI:   Paty Jiménez is a 63 year old Black man with cigarette smoking, alcohol abuse (drinks 3 quarts of beer daily), history of alcohol withdrawal, seizures, coronary artery disease, liver cirrhosis, anemia, thrombocytopenia, cocaine use, hypertension. He lives in [place], [place].    He presented to [facility] Emergency Department on 7/8/2023 after having a seizure. He lives in his niece's guest house. He takes levetiracetam for his seizures. His niece found him sitting on the couch around 10:00 sitting in urine and feces with his eyes rolled back and mouth bleeding. His niece said this happens once every 1 to 2 months and he is brought to the hospital each time. His last seizures were in May and April. His niece's daughter checks on him daily to see if he is taking his levetiracetam, and he is mostly compliant. His niece said that she brought him water and he was able to drink it and speak close to his baseline. He was confused and combative on arrival to the emergency department. He did not have witnessed seizures there. He was oriented to self and moved purposefully. He was found to have tachycardia, tachypnea, fever of 103° Fahrenheit, elevated lactic acid (3.9 mmol/L) and procalcitonin (1.67 ng/mL, later 4.19). He was given rectal acetaminophen, vancomycin, piperacillin-tazobactam, acyclovir, ceftriaxone, and 2 liters of lactated Ringer's solution. Head CT showed no acute process. Lumbar puncture  was done and showed glucose 82, protein 42, WBC 1. It was unclear when his last alcohol drink was. Ethanol level was undetectable. He required multiple doses of mostly benzodiazepines to keep his agitation under control. EKG showed hyperacute T waves in anteroseptal leads and troponin was elevated at 1.7 ng/mL, then trended up to 4.7. He was given rectal aspirin and started on heparin drip. [facility] does not have a cardiac catheterization lab.    He was transferred to [facility] and admitted to Neuro Critical Care. Urine toxicology was positive for cocaine and marijuana, consistent with known history of use. He had rectal bleeding from an internal hemorrhoid after several medications were given and temperatures were taken rectally. Troponin trended up to 5.8 and EKG showed non-ST elevation myocardial infarction.          Hospital Course:   Echocardiogram showed mildly decreased systolic function, segmental left ventricular wall motion abnormalities suggestive of Takotsubo cardiomyopathy, but left anterior descending artery distribution ischemic heart disease could not be ruled out, left ventricular diastolic dysfunction, moderate left atrial enlargement, moderate tricuspid regurgitation, and pulmonary hypertension. On 7/10/2023, he was deemed okay for stepdown to Hospital Medicine. On 7/11/2023, heparin was stopped. He was put on olanzapine 5 mg twice daily. He was placed under PEC. Psychiatry was consulted. He was transferred to Hospital Medicine Team N on 7/12/2023. He was pacing in his room with paranoid delusions. Psychiatry started Depakote and diazepam taper and stopped phenobarbital. Delirium tremens resolved. He was prescribed the STEMI medications and diazepam taper.       Goals of Care Treatment Preferences:  Code Status: Full Code      Consults:   Consults (From admission, onward)          Status Ordering Provider     Inpatient consult to Psychiatry  Once        Provider:   (Not yet assigned)    Completed KRISHNA BIANCHI     Inpatient consult to Interventional Cardiology  Once        Provider:  (Not yet assigned)    Completed TOBIN FARAH     Inpatient consult to Cardiology  Once        Provider:  (Not yet assigned)    Completed TOBIN FARAH          Final Active Diagnoses:    Diagnosis Date Noted POA    PRINCIPAL PROBLEM:  Seizure disorder [G40.909] 01/22/2023 Yes     Chronic    Alcohol use disorder, severe, dependence [F10.20] 07/13/2023 Yes    Cocaine use [F14.90] 07/13/2023 Yes    Substance use disorder [F19.90] 07/12/2023 Yes     Chronic    Elevated troponin [R77.8] 07/08/2023 Yes    STEMI (ST elevation myocardial infarction) [I21.3] 07/08/2023 Yes    HTN (hypertension) [I10] 01/23/2023 Yes     Chronic    Alcohol abuse [F10.10] 07/15/2022 Yes     Chronic    Tobacco use disorder [F17.200] 07/15/2022 Yes     Chronic      Problems Resolved During this Admission:    Diagnosis Date Noted Date Resolved POA    Delirium tremens [F10.931] 07/13/2023 07/14/2023 Yes    SIRS (systemic inflammatory response syndrome) [R65.10] 07/09/2023 07/13/2023 Yes    Encephalopathy acute [G93.40] 07/09/2023 07/14/2023 Yes       Discharged Condition: good    Disposition: Home or Self Care    Follow Up:   Follow-up Information       ALISON Cornejo Follow up on 8/8/2023.    Specialty: Internal Medicine  Why: 8 AM  Contact information:  [address] [place] [address]  [phone]                           Patient Instructions:      Diet Adult Regular     Notify your health care provider if you experience any of the following:  increased confusion or weakness     Activity as tolerated       Significant Diagnostic Studies:   X-Ray Chest AP Portable 7/08/23: FINDINGS:   Central pulmonary vascular congestion with mild interstitial edema.  No consolidation or pleural effusions.  Heart size is normal.  Skeletal structures are intact.   CT Head Without Contrast 7/08/23: FINDINGS:   The  subcutaneous tissues are unremarkable.  The bony calvarium is intact.  There is a chronic fracture involving the left orbital floor.  The paranasal sinuses are unremarkable.  The mastoid air cells are clear.  The orbits and intraorbital contents are within normal limits.   The craniocervical junction is intact.  The midline structures are unremarkable.  The ventricles and sulci are prominent, consistent cerebral volume loss.  There are extensive hypodensities within the periventricular and subcortical white matter.  The gray-white differentiation is maintained.  There is no dense vessel sign.  There is no evidence of mass effect.   Impression:  No acute intracranial process.  Additional evaluation, as clinically warranted.   Changes of chronic vessel ischemic disease and cerebral volume loss.   Motion limited examination.   X-Ray Abdomen AP 1 View 7/08/23: FINDINGS:   Enteric tube courses below the diaphragm with tip projecting just right of midline over the anticipated region of the distal stomach.  No significantly dilated loops of bowel in the visualized upper abdomen.  No significant interval detrimental change in the radiographic appearance of visualized intrathoracic structures from prior chest radiograph 07/08/2023 at 14:30.   Transthoracic echo complete with contrast 7/09/23:   The left ventricle is normal in size with mildly decreased systolic function.  The estimated ejection fraction is 40%.  There are segmental left ventricular wall motion abnormalities suggestive of Takotsubo cardiomyopathy, but LAD distribution ischemic heart disease cannot be ruled out.  Grade II left ventricular diastolic dysfunction.  Normal right ventricular size with normal right ventricular systolic function.  Moderate left atrial enlargement.  Mild mitral regurgitation.  Moderate tricuspid regurgitation.  The estimated PA systolic pressure is 47 mmHg.  There is pulmonary hypertension.  Normal central venous pressure (3  mmHg).    Pending Diagnostic Studies:       Procedure Component Value Units Date/Time    Freeze and Hold, [facility] [[id number]] Collected: 07/08/23 1640    Order Status: Sent Lab Status: No result     Specimen: CSF (Spinal Fluid) from Cerebrospinal Fluid            Medications:  Reconciled Home Medications:      Medication List        START taking these medications      aspirin 81 MG Chew  Take 1 tablet (81 mg total) by mouth once daily.  Start taking on: July 15, 2023     clopidogreL 75 mg tablet  Commonly known as: PLAVIX  Take 1 tablet (75 mg total) by mouth once daily.  Start taking on: July 15, 2023     dapagliflozin propanediol 10 mg tablet  Commonly known as: FARXIGA  Take 1 tablet (10 mg total) by mouth once daily.     diazePAM 5 MG tablet  Commonly known as: VALIUM  Take 2 tablets (10 mg total) by mouth 2 (two) times a day for 1 day, THEN 1 tablet (5 mg total) 2 (two) times a day for 1 day, THEN 1 tablet (5 mg total) once daily for 1 day.  Start taking on: July 14, 2023     sacubitriL-valsartan 24-26 mg per tablet  Commonly known as: ENTRESTO  Take 1 tablet by mouth 2 (two) times daily.     thiamine 100 MG tablet  Take 1 tablet (100 mg total) by mouth once daily.  Start taking on: July 15, 2023            CONTINUE taking these medications      levETIRAcetam 250 MG Tab  Commonly known as: KEPPRA  Take 2 tablets (500 mg total) by mouth 2 (two) times daily.              Indwelling Lines/Drains at time of discharge: None    Time spent on the discharge of patient: 35 minutes         Shukri Jeffrey MD  Department of Hospital Medicine  [facility] Neurosurgery ([facility]

## 2023-07-14 NOTE — DISCHARGE INSTRUCTIONS
[facility]    [facility]  (aka [facility], [facility])  Serves [facility] residents.  Serves uninsured patients & those with Medicaid.  Main location: [address] [place], [place] [address] [phone]  Walk-in's available during regular business hours.  24/7 Crisis Line: [phone]    [facility]  (aka [facility])  Serves [facility].  Serves uninsured patients, those with Medicaid and some private plans.  Walk-in's available during regular business hours.  Primary care services available as well.  [facility]: [address] [place] [address];   [phone]  [place]: [address] [place] [address];   [phone]  24/7 Crisis Line: [phone]    [facility]  [facility], [address]  [address]  Mon, Wed, Fri- 1-4:30pm  [phone], [phone]    [facility]  Serves uninsured patients & those with Medicaid, call for more info.  Primary care, pediatrics, HIV treatment, and dentistry services available as well.   Three locations.  [phone]    [facility] Corporate Office  Serves patients with Medicaid, Medicare, and private insurance  [address] [place] [address] [phone]    [facility]  Serves uninsured on a sliding scale, as well as Medicaid, Medicare, and private plans.  Eight locations around the [facility] area.  [phone]    [facility]  Serves uninsured patients & those with Medicaid, private insurances.  Primary care available as well.   [phone]  [address] [place] [address]    [facility]  Serves veterans who were honorably discharged.  [address] [place], [place] [id number] [phone]   24/7  [facility]ans Crisis Line: [phone] (Press 1)    If you have private insurance and need to find a specialist, please contact your insurance network to request a list of providers covered by your benefits.          REFERRAL RECOMMENDATIONS FOR ALCOHOL USE DISORDER      12 STEP PROGRAMS (and similar):     Alcoholics Anonymous (local)  [x] [phone]  [x] www.[place].org for schedules for in-person and online meetings  [x] There are AA meetings throughout the day all over town  [x] AA costs nothing to attend; they pass a basket for donations but this is not required    Alcoholics Anonymous Online Intergroup (national)  [x] www.aa-intergroup.org  [x] Good resource for large, nation-wide meetings  [x] Can also attend smaller, local meetings in other cities  [x] Countless meetings all day and all night  [x] AA costs nothing to attend; they pass a basket for donations but this is not required    Flying Sober - 24/7 zoom meetings for women and coed - sign on anytime, anywhere!  [email or web address]-[email or web address]    Online Intergroup of AA - 121 Open AA [place] Meeting - 24/7 zoom meetings  https://aa-intergroup.org/meetings/    LOOKING FOR AN ALTERNATIVE TO 12 STEP PROGRAMS - check out:  SMART Recovery: https://www.smartrecovery.org/about-us  [facility]: https://recoverydharma.org      DETOX UNITS (USUALLY 5-7 DAYS):     [facility]: [address], [place]  [phone], call first to ensure bed availability    [facility]: [address] [place]  [phone], Option 1, call first to ensure bed availability    [facility]: [address], [place]  [phone] (intake by appointment only)    [facility]: [address], [place]  [phone]      INTENSIVE OUTPATIENT PROGRAMS:     [facility] (formerly known as the ABU)  [x] [phone], Option 2  [x] [address] 4th Floor, [place] [address]  [x]  https://www.[facility].org/services/[facility]-recovery-program  [x] [facility] delivers comprehensive and collaborative treatment for alcohol and substance use disorders.  Excellent program for working professionals or anyone else seeking recovery.  [x] Requires insurance approval prior to starting program, call number above for more information.  [x] Intensive Outpatient Rehabilitation Program - M-F 9am-3pm - daily groups with psychologists and social workers, sessions with MDs 3x per week   [x] Ambulatory detox and dual diagnosis available    [facility] Intensive Outpatient Program  [x] [phone]  [x] [facility] (the clinic not on [facility])  [x] Call number above for more info and to check insurance requirements    [facility]  [address] [place] [address] [phone]    [facility]:  [address]?, [place], [place] [address]?, [phone]  [address] [place], [place] [address]?, [phone]    RESIDENTIAL REHABS (USUALLY 28 DAYS):     [facility]: [address], [phone]    [facility]: [address] [place] , [facility]  [phone] (intake by appointment only)    [facility] [place] (men only) [address] [facility], [phone]    [facility] [place] (Female only) [address] [facility], [phone]    [facility]: [address] [facility], men's program [phone], women's program [phone]    [facility]: [address], [facility], [phone]    [facility]: [address], [place], [place], [phone]    [facility]: Men only, [phone], [address] [place]    [facility]: [address], [place], [place], [phone]    [facility]: [address] [place] [address],  [phone]  New Location: [address] [place] [address] [address], [place] [address], [phone]    [facility]:   ?[address] [address]  [place], [place] [address]?[phone]    [place]: [address], [place] [address], [phone]    [place]: [place], [phone]     [facility]: [place], [place], [phone]    [facility]: [place], [phone]    [facility]: [place], [place], [phone]    [place]: [place], [place], [phone]    [facility]: [address], [place] [address], [phone]    COMMUNITY ADDICTION CLINICS:     [facility]: [address] [place], [phone] -or- [address] [place] [address]    [facility] [place]: [address] [facility], [place]  [address]     [facility]: Clinics [phone]; Crisis [phone]    [facility]: [address] [place] [address]    [facility]: [address] [place] [address]    [facility]: [address] [place] [address],    [facility] Center: 2nd Floor [address] [place] [address]    [facility]: [address] [place] [address]    [facility], [address] [place] [address]    [facility]: [address], [place] [phone]  (serves youth 16-23 years old)    [facility]: [facility]/[place] [phone]    [facility]: [address] [facility] [address] [facility], [place] [phone]    [facility]: [address], [facility] [phone]    [facility]: [address] [facility], [address], [phone]     [facility]: [address] [facility] [address] [place], [phone], [phone]    RESOURCES IN OTHER PARISLincoln Hospital:     [facility]: [address] [place], [phone], [phone]    [facility]: [address]  "[facility], [address], [phone]    [facility], [address] [facility], [phone]    [facility] Behavioral Health: [address] [facility], [phone]    [facility], [address] [facility], [phone] ([facility])    [facility], [address] [facility], [phone] ([facility])    [facility], [address], [place], [phone] ([place], [place], and [facility])    [facility], [address] [facility], [phone] ([facility])    [facility] Hotline [phone], [phone]    [facility], [address] [facility], [facility], [address] [facility] [address], [facility] Behavioral Health [place], [address] [facility] [facility], [facility]al [facility], [address] [facility]. [address] [facility] Behavioral [facility], [address], [facility], [address], [address] [phone]    [facility], [address] [facility] [phone]    [facility], [address] [facility], [phone]    [facility] for Addiction Recovery, [address] [facility], [phone]      Sammarinese SPEAKING (en español):     Información de la reunión de Alcohólicos Anónimos  [facility]ane [facility], 10:00 am  Habla español  Esta reunión está abierta y cualquiera puede asistir.    German speaking Alcoholics anonymous meetings:  El "Blane Ruthven AA Skype" es un blane on line de Alcohólicos Anónimos en español. El blane es muna, gratuito y virtual a través de Skype Audio. El blane funciona mediante ole llamada grupal de voz, por lo que no se utiliza la videollamada, ni se pueden marshall las imágenes o rostros de los " participantes. Hace riley años y medio abrimos el primer Blane de AA por Skype en Javier, cesar actualmente asisten personas desde [place], [place], [place], [place], [place], [place], [place], [place], [place], [place], [place] y [place], entre otros.    El blane es muy útil para los alcohólicos que residen en lugares donde no se celebran reuniones de AA, o residen en lugares donde las reuniones de AA son un número limitado de días a la semana, o para aquellos compañeros que se hayan de viaje o que, por cualquier motivo, se hayan convalecientes y no pueden desplazarse. Todos los días nos reuniones a las 21:00 (hora española)    Podéis obtener más información sobre el blane y thiago sesiones en la página web [email or web address]      MENTAL HEALTH/ADDICTIVE DISORDERS:     AA ([phone]), NA ([phone])   National Suicide Prevention Lifeline- Call 1-176.709.5386 Available 24 hours [facility] [phone]; Crisis Line [phone] - Call for options A-F:  Intensive Outpatient Treatment/ Day programs   [facility], please contact   [facility], please contact [phone] or [phone] to speak with an admissions counselor.  Behavioral Health Group (Methadone Maintenance)   [address] [place] [address], [phone]  [facility] [place] [address] [phone]  [facility], [address] [address], [phone]  [facility], [place] [phone]  [facility] [place] please contact [phone]  [facility], [address] [place] [address], 4th floor [place] [address] Phone: [phone]   Acer  Michael Office: [address] [place] [address], [phone]  [place] Office: [address] [facility], [place] [address], [phone]  [place] Office: [address] [place] [address] [phone]    Outpatient Substance Abuse Treatment   Behavioral Health Group  (Methadone Maintenance)   [address] [place] [address], [phone]  [address], [place], [place] [address] [phone]  [facility], [address] [place] [address], [phone]  [place]le Rodriguez Office: [address] [address], [phone]  [place] Office: [address] [facility], [place] [address], [phone]  [place] Office: [address] [place] [address] [phone]  [facility], [address] [place], [place] [address] [phone]   [facility] Recovery, [address] [facility], [address], [phone]  [facility] Recover, [address] [place], [place] [phone]    Residential Substance Abuse Treatment   [facility] [address] [facility], [phone]  [facility], [address] [facility], [phone]  [facility] (men only) [address] [place] [address], [phone]  [facility] at the [facility] (women only) [address] [place] [address] [phone]  [facility], [address] [place] [address] [phone]  [facility] (women only), intakes at [address] [facility], [phone]  [facility] (7-day program, $100, [address] [place], [phone], [phone], [phone])  [facility] (Men only, [phone]), [address], [place] (Vets*/Non-Vets)  [facility] (Men only, $400/month program fee) [address] [facility] [place], [phone]  [facility] (Women over age 39 only), [address] [facility], [phone]    Out of Area:    [place], [address] [facility] [address], [place], [place] ([phone])  [facility] Program (men only), [facility], [address]35 [facility] [place] [address], [phone]  [facility], [address] [place], [place] [phone])  [address] [place], [place] ([phone])  [facility], [address] [facility],  [phone]  [facility] (Women only, has to have mental illness, can be homeless or substance abuser), [phone]      [place] RESOURCES:     [place] [facility] Crisis Response Team:    Region 12 ([place], [place], [place], and [facility]) ([facility])  [phone]      Outpatient Mental Health & Addiction Clinic Resources for both [facility]:    [facility] Mental Healthcare Resources  Website: www.[facility].org  Main Number: 359-181-7308    [facility] ([facility])  [address] ([address] [facility]) [place] [address]  [phone]    [facility] ([facility])  [address] ([address] [facility] [place] [address]  [phone]    [facility]  [address] ([address]) [place] [address]  [phone]    [facility]  [address] ([address] [facility]) [place] [address]  [phone]      Addiction Treatment Resources for both [facility]:    [facility] (Detox, Residential, PHP, IOP, and Aftercare Programs)  [address] [place] [address]  [phone]    [facility] (Residential, IOP, Transitional Living, and Aftercare Programs)  [address] [place] [address]  [phone]    [facility] (Inpatient, Residential, Detox, IOP, Outpatient, and Aftercare Programs)  [address] [facility], [place] [address] [phone] or toll free at [phone]      Outpatient Mental Health Psychotherapy Resources for both [facility]:    Akila Song, LCSW  [address]  [place] [address]  [phone]  Specialties: Depression, Anxiety, and Life Transitions    Viki Paige, PhD  [address] [facility] [address]  [address]  [place] [address]  [phone]  Specialties:  Testing and Evaluation, Education and Learning Disabilities, and ADHD    Becky Cheung, [facility] [address] [address]  [phone]  Specialties: Obsessive-Compulsive (OCD), Depression, and Relationship Issues    Trina Patel LPC [address] [place] [address]  [phone]  Specialties: Trauma & PTSD, Mood Disorders, and Anxiety    Trina Potter, PhD, [facility]  [facility] [address] [address]  [phone]  Specialties: Family Conflict, Child, and Relationship Issues    Peggy Mcdonald LPC Counseling [place] [address] [phone]  Specialties: Anxiety, Depression, and Anger Management        IN CASE OF SUICIDAL THINKING, call the National Suicide Hotline Number: 988    988 Suicide & Crisis Lifeline: 988 , 3-406-818-TALK (8255)  Provides 24/7, free and confidential support for people in distress, prevention and crisis resources for you or your loved ones, and best practices for professionals.    Call, text or chat.  https://988lifeline.org

## 2023-07-17 NOTE — PLAN OF CARE
07/17/23 1258   Final Note   Assessment Type Final Discharge Note   Anticipated Discharge Disposition Home     Patient d/c home 7/14/23. Maureen Mims RN

## 2023-07-26 PROBLEM — D69.6 THROMBOCYTOPENIA: Status: ACTIVE | Noted: 2023-07-26

## 2023-07-26 PROBLEM — R74.01 TRANSAMINITIS: Status: ACTIVE | Noted: 2023-07-26

## 2023-07-26 PROBLEM — F10.931 ALCOHOL WITHDRAWAL SEIZURE WITH DELIRIUM: Status: ACTIVE | Noted: 2023-07-26

## 2023-07-26 PROBLEM — T67.01XA: Status: ACTIVE | Noted: 2023-07-26

## 2023-07-26 PROBLEM — R56.9 ALCOHOL WITHDRAWAL SEIZURE WITH DELIRIUM: Status: ACTIVE | Noted: 2023-07-26

## 2023-07-26 PROBLEM — I50.21 ACUTE SYSTOLIC HEART FAILURE: Status: ACTIVE | Noted: 2023-07-26

## 2023-08-23 ENCOUNTER — HOSPITAL ENCOUNTER (INPATIENT)
Facility: HOSPITAL | Age: 63
LOS: 1 days | Discharge: HOME | DRG: 641 | End: 2023-08-24
Attending: EMERGENCY MEDICINE | Admitting: INTERNAL MEDICINE
Payer: MEDICAID

## 2023-08-23 DIAGNOSIS — F10.21 HISTORY OF ALCOHOLISM: ICD-10-CM

## 2023-08-23 DIAGNOSIS — S09.90XA CLOSED HEAD INJURY: ICD-10-CM

## 2023-08-23 DIAGNOSIS — R56.9 SEIZURE: ICD-10-CM

## 2023-08-23 DIAGNOSIS — E87.1 HYPONATREMIA: Primary | ICD-10-CM

## 2023-08-23 DIAGNOSIS — E87.20 METABOLIC ACIDOSIS: ICD-10-CM

## 2023-08-23 DIAGNOSIS — G40.909 SEIZURE DISORDER: ICD-10-CM

## 2023-08-23 PROBLEM — I27.20 PULMONARY HYPERTENSION: Status: ACTIVE | Noted: 2023-08-23

## 2023-08-23 PROBLEM — I50.42 CHRONIC COMBINED SYSTOLIC AND DIASTOLIC HEART FAILURE: Status: ACTIVE | Noted: 2023-08-23

## 2023-08-23 LAB
ALBUMIN SERPL BCP-MCNC: 3.2 G/DL (ref 3.5–5.2)
ALP SERPL-CCNC: 107 U/L (ref 55–135)
ALT SERPL W/O P-5'-P-CCNC: 11 U/L (ref 10–44)
AMPHET+METHAMPHET UR QL: NEGATIVE
AMPHET+METHAMPHET UR QL: NEGATIVE
ANION GAP SERPL CALC-SCNC: 16 MMOL/L (ref 8–16)
ANION GAP SERPL CALC-SCNC: 8 MMOL/L (ref 8–16)
AST SERPL-CCNC: 30 U/L (ref 10–40)
BARBITURATES UR QL SCN>200 NG/ML: NEGATIVE
BARBITURATES UR QL SCN>200 NG/ML: NEGATIVE
BASOPHILS # BLD AUTO: 0.05 K/UL (ref 0–0.2)
BASOPHILS NFR BLD: 0.9 % (ref 0–1.9)
BENZODIAZ UR QL SCN>200 NG/ML: NEGATIVE
BENZODIAZ UR QL SCN>200 NG/ML: NEGATIVE
BILIRUB SERPL-MCNC: 0.6 MG/DL (ref 0.1–1)
BUN SERPL-MCNC: 10 MG/DL (ref 8–23)
BUN SERPL-MCNC: 10 MG/DL (ref 8–23)
BZE UR QL SCN: NEGATIVE
BZE UR QL SCN: NEGATIVE
CALCIUM SERPL-MCNC: 8.3 MG/DL (ref 8.7–10.5)
CALCIUM SERPL-MCNC: 9.2 MG/DL (ref 8.7–10.5)
CANNABINOIDS UR QL SCN: NEGATIVE
CANNABINOIDS UR QL SCN: NEGATIVE
CHLORIDE SERPL-SCNC: 100 MMOL/L (ref 95–110)
CHLORIDE SERPL-SCNC: 98 MMOL/L (ref 95–110)
CO2 SERPL-SCNC: 11 MMOL/L (ref 23–29)
CO2 SERPL-SCNC: 23 MMOL/L (ref 23–29)
CREAT SERPL-MCNC: 0.8 MG/DL (ref 0.5–1.4)
CREAT SERPL-MCNC: 0.8 MG/DL (ref 0.5–1.4)
CREAT UR-MCNC: 16.5 MG/DL (ref 23–375)
CREAT UR-MCNC: 16.5 MG/DL (ref 23–375)
DIFFERENTIAL METHOD: ABNORMAL
EOSINOPHIL # BLD AUTO: 0.1 K/UL (ref 0–0.5)
EOSINOPHIL NFR BLD: 2.5 % (ref 0–8)
ERYTHROCYTE [DISTWIDTH] IN BLOOD BY AUTOMATED COUNT: 13.8 % (ref 11.5–14.5)
EST. GFR  (NO RACE VARIABLE): >60 ML/MIN/1.73 M^2
EST. GFR  (NO RACE VARIABLE): >60 ML/MIN/1.73 M^2
ETHANOL SERPL-MCNC: 36 MG/DL
GLUCOSE SERPL-MCNC: 80 MG/DL (ref 70–110)
GLUCOSE SERPL-MCNC: 89 MG/DL (ref 70–110)
HCT VFR BLD AUTO: 30.9 % (ref 40–54)
HGB BLD-MCNC: 10.6 G/DL (ref 14–18)
IMM GRANULOCYTES # BLD AUTO: 0.03 K/UL (ref 0–0.04)
IMM GRANULOCYTES NFR BLD AUTO: 0.6 % (ref 0–0.5)
LYMPHOCYTES # BLD AUTO: 2.7 K/UL (ref 1–4.8)
LYMPHOCYTES NFR BLD: 50.8 % (ref 18–48)
MAGNESIUM SERPL-MCNC: 2 MG/DL (ref 1.6–2.6)
MCH RBC QN AUTO: 29.4 PG (ref 27–31)
MCHC RBC AUTO-ENTMCNC: 34.3 G/DL (ref 32–36)
MCV RBC AUTO: 86 FL (ref 82–98)
METHADONE UR QL SCN>300 NG/ML: NEGATIVE
METHADONE UR QL SCN>300 NG/ML: NEGATIVE
MONOCYTES # BLD AUTO: 0.7 K/UL (ref 0.3–1)
MONOCYTES NFR BLD: 13.8 % (ref 4–15)
NEUTROPHILS # BLD AUTO: 1.7 K/UL (ref 1.8–7.7)
NEUTROPHILS NFR BLD: 31.4 % (ref 38–73)
NRBC BLD-RTO: 0 /100 WBC
OPIATES UR QL SCN: NEGATIVE
OPIATES UR QL SCN: NEGATIVE
PCP UR QL SCN>25 NG/ML: NEGATIVE
PCP UR QL SCN>25 NG/ML: NEGATIVE
PLATELET # BLD AUTO: 130 K/UL (ref 150–450)
PMV BLD AUTO: 9.2 FL (ref 9.2–12.9)
POTASSIUM SERPL-SCNC: 4 MMOL/L (ref 3.5–5.1)
POTASSIUM SERPL-SCNC: 4.3 MMOL/L (ref 3.5–5.1)
PROT SERPL-MCNC: 7 G/DL (ref 6–8.4)
RBC # BLD AUTO: 3.6 M/UL (ref 4.6–6.2)
SODIUM SERPL-SCNC: 125 MMOL/L (ref 136–145)
SODIUM SERPL-SCNC: 131 MMOL/L (ref 136–145)
TOXICOLOGY INFORMATION: ABNORMAL
TOXICOLOGY INFORMATION: ABNORMAL
WBC # BLD AUTO: 5.28 K/UL (ref 3.9–12.7)

## 2023-08-23 PROCEDURE — 63600175 PHARM REV CODE 636 W HCPCS: Performed by: INTERNAL MEDICINE

## 2023-08-23 PROCEDURE — 80307 DRUG TEST PRSMV CHEM ANLYZR: CPT | Performed by: EMERGENCY MEDICINE

## 2023-08-23 PROCEDURE — 99285 EMERGENCY DEPT VISIT HI MDM: CPT | Mod: 25

## 2023-08-23 PROCEDURE — 80053 COMPREHEN METABOLIC PANEL: CPT | Performed by: EMERGENCY MEDICINE

## 2023-08-23 PROCEDURE — 83735 ASSAY OF MAGNESIUM: CPT | Performed by: EMERGENCY MEDICINE

## 2023-08-23 PROCEDURE — 25000003 PHARM REV CODE 250: Performed by: INTERNAL MEDICINE

## 2023-08-23 PROCEDURE — 63600175 PHARM REV CODE 636 W HCPCS

## 2023-08-23 PROCEDURE — 21400001 HC TELEMETRY ROOM

## 2023-08-23 PROCEDURE — 63600175 PHARM REV CODE 636 W HCPCS: Performed by: EMERGENCY MEDICINE

## 2023-08-23 PROCEDURE — 36415 COLL VENOUS BLD VENIPUNCTURE: CPT | Performed by: INTERNAL MEDICINE

## 2023-08-23 PROCEDURE — 25000003 PHARM REV CODE 250: Performed by: EMERGENCY MEDICINE

## 2023-08-23 PROCEDURE — 96372 THER/PROPH/DIAG INJ SC/IM: CPT | Performed by: EMERGENCY MEDICINE

## 2023-08-23 PROCEDURE — 80048 BASIC METABOLIC PNL TOTAL CA: CPT | Performed by: INTERNAL MEDICINE

## 2023-08-23 PROCEDURE — 82077 ASSAY SPEC XCP UR&BREATH IA: CPT | Performed by: EMERGENCY MEDICINE

## 2023-08-23 PROCEDURE — 85025 COMPLETE CBC W/AUTO DIFF WBC: CPT | Performed by: EMERGENCY MEDICINE

## 2023-08-23 PROCEDURE — 96375 TX/PRO/DX INJ NEW DRUG ADDON: CPT

## 2023-08-23 PROCEDURE — 96374 THER/PROPH/DIAG INJ IV PUSH: CPT

## 2023-08-23 RX ORDER — NAPROXEN SODIUM 220 MG/1
81 TABLET, FILM COATED ORAL DAILY
Status: DISCONTINUED | OUTPATIENT
Start: 2023-08-23 | End: 2023-08-24 | Stop reason: HOSPADM

## 2023-08-23 RX ORDER — LORAZEPAM 2 MG/ML
1 INJECTION INTRAMUSCULAR
Status: COMPLETED | OUTPATIENT
Start: 2023-08-23 | End: 2023-08-23

## 2023-08-23 RX ORDER — ATORVASTATIN CALCIUM 20 MG/1
20 TABLET, FILM COATED ORAL DAILY
COMMUNITY
Start: 2023-08-14 | End: 2023-09-07 | Stop reason: SDUPTHER

## 2023-08-23 RX ORDER — LORAZEPAM 2 MG/ML
INJECTION INTRAMUSCULAR
Status: COMPLETED
Start: 2023-08-23 | End: 2023-08-23

## 2023-08-23 RX ORDER — DIAZEPAM 5 MG/1
10 TABLET ORAL EVERY 8 HOURS
Status: DISCONTINUED | OUTPATIENT
Start: 2023-08-23 | End: 2023-08-24 | Stop reason: HOSPADM

## 2023-08-23 RX ORDER — CLOPIDOGREL BISULFATE 75 MG/1
75 TABLET ORAL DAILY
Status: DISCONTINUED | OUTPATIENT
Start: 2023-08-23 | End: 2023-08-24 | Stop reason: HOSPADM

## 2023-08-23 RX ORDER — ENOXAPARIN SODIUM 100 MG/ML
40 INJECTION SUBCUTANEOUS EVERY 24 HOURS
Status: DISCONTINUED | OUTPATIENT
Start: 2023-08-23 | End: 2023-08-24 | Stop reason: HOSPADM

## 2023-08-23 RX ORDER — THIAMINE HYDROCHLORIDE 100 MG/ML
100 INJECTION, SOLUTION INTRAMUSCULAR; INTRAVENOUS
Status: COMPLETED | OUTPATIENT
Start: 2023-08-23 | End: 2023-08-23

## 2023-08-23 RX ORDER — LEVETIRACETAM 500 MG/1
500 TABLET ORAL 2 TIMES DAILY
Status: DISCONTINUED | OUTPATIENT
Start: 2023-08-23 | End: 2023-08-23 | Stop reason: SDUPTHER

## 2023-08-23 RX ORDER — SODIUM CHLORIDE 9 MG/ML
INJECTION, SOLUTION INTRAVENOUS CONTINUOUS
Status: DISCONTINUED | OUTPATIENT
Start: 2023-08-23 | End: 2023-08-24 | Stop reason: HOSPADM

## 2023-08-23 RX ORDER — LEVETIRACETAM 500 MG/5ML
1000 INJECTION, SOLUTION, CONCENTRATE INTRAVENOUS EVERY 12 HOURS
Status: DISCONTINUED | OUTPATIENT
Start: 2023-08-23 | End: 2023-08-24 | Stop reason: HOSPADM

## 2023-08-23 RX ADMIN — LORAZEPAM 1 MG: 2 INJECTION INTRAMUSCULAR; INTRAVENOUS at 06:08

## 2023-08-23 RX ADMIN — LEVETIRACETAM 1000 MG: 100 INJECTION, SOLUTION INTRAVENOUS at 08:08

## 2023-08-23 RX ADMIN — SODIUM CHLORIDE 1000 ML: 9 INJECTION, SOLUTION INTRAVENOUS at 10:08

## 2023-08-23 RX ADMIN — DIAZEPAM 10 MG: 5 TABLET ORAL at 02:08

## 2023-08-23 RX ADMIN — THIAMINE HYDROCHLORIDE 100 MG: 100 INJECTION, SOLUTION INTRAMUSCULAR; INTRAVENOUS at 08:08

## 2023-08-23 RX ADMIN — SACUBITRIL AND VALSARTAN 1 TABLET: 24; 26 TABLET, FILM COATED ORAL at 09:08

## 2023-08-23 RX ADMIN — DIAZEPAM 10 MG: 5 TABLET ORAL at 09:08

## 2023-08-23 RX ADMIN — ENOXAPARIN SODIUM 40 MG: 40 INJECTION SUBCUTANEOUS at 04:08

## 2023-08-23 RX ADMIN — SODIUM CHLORIDE: 9 INJECTION, SOLUTION INTRAVENOUS at 05:08

## 2023-08-23 RX ADMIN — CLOPIDOGREL BISULFATE 75 MG: 75 TABLET ORAL at 10:08

## 2023-08-23 RX ADMIN — LEVETIRACETAM 1000 MG: 100 INJECTION, SOLUTION INTRAVENOUS at 09:08

## 2023-08-23 RX ADMIN — ASPIRIN 81 MG CHEWABLE TABLET 81 MG: 81 TABLET CHEWABLE at 10:08

## 2023-08-23 RX ADMIN — LORAZEPAM 1 MG: 2 INJECTION INTRAMUSCULAR at 06:08

## 2023-08-23 RX ADMIN — THERA TABS 1 TABLET: TAB at 08:08

## 2023-08-23 NOTE — ASSESSMENT & PLAN NOTE
Patient has hyponatremia which is uncontrolled,We will aim to correct the sodium by 4-6mEq in 24 hours. We will monitor sodium Every 8 hours. The hyponatremia is due to Alcohol abuse. We will obtain the following studies:We will treat the hyponatremia with   aThe patient's sodium results have been reviewed and are listed below.  Recent Labs   Lab 08/23/23  0641   *     Gentle IV hydration- has history of CHF

## 2023-08-23 NOTE — NURSING TRANSFER
Nursing Transfer Note      8/23/2023   12:26 PM    Transfer From: ED    Transfer via stretcher    Transfer with cardiac monitoring    Medicines sent: none    Any special needs or follow-up needed: none    Patient belongings transferred with patient: Yes    Chart send with patient: Yes    Patient reassessed at: 8/23/23 @ 1205      Upon arrival to floor: cardiac monitor applied, patient oriented to room, call bell in reach, and bed in lowest position

## 2023-08-23 NOTE — ED PROVIDER NOTES
Encounter Date: 8/23/2023    SCRIBE #1 NOTE: I, Ashley Harrison, am scribing for, and in the presence of,  Antonio Bowie MD. I have scribed the following portions of the note - Other sections scribed: HPI, ROS.       History     Chief Complaint   Patient presents with    Seizures     BIB [place] [employer], pt had witnessed seizure under 1 minute, hx of seizure noncompliant with meds, pos etoh. Postitcal. Abrasion to knee and hematoma      This 63 y.o male, with a medical history of Delirium tremens and Seizures, presents to the ED via EMS transportation s/p a witnessed seizure. Per EMS, pt's family reported that pt is non-compliant with his seizure medication (Keppra). Pt states that he is unsure when he last took Keppra. He does not have any complaints at this time. He does admit to consuming alcohol yesterday. EMS reports a knot to the right occipital region of the head and an abrasion to the right knee. Of note, pt was last seen in the ED for a seizure on 7/8/23. He was subsequently found to have an abnormal EKG and a troponin of 1.7, prompting him to be transferred to [facility] for further evaluation. Pt denies headache, ear pain, eye pain, sore throat, cough, chest pain, shortness of breath, abdominal pain, emesis, diarrhea, or dysuria. No other associated symptoms. No alleviating factors. Additional history, is provided by an independent historian: EMS provider.    The history is provided by the patient and the EMS personnel.     Review of patient's allergies indicates:  No Known Allergies  Past Medical History:   Diagnosis Date    Delirium tremens 7/13/2023    Seizures      History reviewed. No pertinent surgical history.  Family History   Problem Relation Age of Onset    Cancer Mother     Cancer Father         liver     Social History     Tobacco Use    Smoking status: Every Day     Current packs/day: 0.00     Types: Cigarettes   Substance Use Topics    Alcohol use: Yes     Comment: 3 quarts of beer  daily    Drug use: Not Currently     Review of Systems   Constitutional:  Negative for fever.   HENT:  Negative for ear pain and sore throat.         (+) knot to the right occipital region of the head   Eyes:  Negative for pain.   Respiratory:  Negative for cough and shortness of breath.    Cardiovascular:  Negative for chest pain.   Gastrointestinal:  Negative for abdominal pain, diarrhea, nausea and vomiting.   Genitourinary:  Negative for dysuria.   Musculoskeletal:  Negative for back pain.   Skin:  Negative for rash.        (+) abrasion to the right knee   Neurological:  Positive for seizures. Negative for weakness and headaches.       Physical Exam     Initial Vitals [08/23/23 0608]   BP Pulse Resp Temp SpO2   (!) 162/62 74 18 98.6 °F (37 °C) 98 %      MAP       --         Physical Exam  The patient was examined specifically for the following:   General:No significant distress, Good color, Warm and dry. Head and neck:Scalp atraumatic, Neck supple. Neurological:Appropriate conversation, Gross motor deficits. Eyes:Conjugate gaze, Clear corneas. ENT: No epistaxis. Cardiac: Regular rate and rhythm, Grossly normal heart tones. Pulmonary: Wheezing, Rales. Gastrointestinal: Abdominal tenderness, Abdominal distention. Musculoskeletal: Extremity deformity, Apparent pain with range of motion of the joints. Skin: Rash.   The findings on examination were normal except for the following:  Patient has an occipital scalp contusion.  Lungs are clear.  The heart tones are normal.  The patient is awake alert bright appropriate conversation cranial nerves motor and sensory examination are normal there is no midline spinal tenderness along its entire course.  The patient has abrasion of the right knee.  His pants are wet.  There is no extremity tenderness pain with range of motion any joints.  Chest abdomen pelvis are nontender.  Lungs are clear the heart tones are normal.  ED Course   Critical Care    Date/Time: 8/23/2023 8:43  AM    Performed by: Antonio Bowie MD  Authorized by: Antonio Bowie MD  Direct patient critical care time: 22 minutes  Additional history critical care time: 11 minutes  Ordering / reviewing critical care time: 11 minutes  Documentation critical care time: 11 minutes  Consulting other physicians critical care time: 11 minutes  Total critical care time (exclusive of procedural time) : 66 minutes  Critical care time was exclusive of separately billable procedures and treating other patients and teaching time.  Critical care was necessary to treat or prevent imminent or life-threatening deterioration of the following conditions: metabolic crisis and CNS failure or compromise.  Critical care was time spent personally by me on the following activities: development of treatment plan with patient or surrogate, discussions with primary provider, evaluation of patient's response to treatment, examination of patient, obtaining history from patient or surrogate, ordering and review of radiographic studies, ordering and performing treatments and interventions, ordering and review of laboratory studies, pulse oximetry, re-evaluation of patient's condition and review of old charts.        Labs Reviewed   COMPREHENSIVE METABOLIC PANEL - Abnormal; Notable for the following components:       Result Value    Sodium 125 (*)     CO2 11 (*)     Calcium 8.3 (*)     Albumin 3.2 (*)     All other components within normal limits   CBC W/ AUTO DIFFERENTIAL - Abnormal; Notable for the following components:    RBC 3.60 (*)     Hemoglobin 10.6 (*)     Hematocrit 30.9 (*)     Platelets 130 (*)     Immature Granulocytes 0.6 (*)     Gran # (ANC) 1.7 (*)     Gran % 31.4 (*)     Lymph % 50.8 (*)     All other components within normal limits   ALCOHOL,MEDICAL (ETHANOL) - Abnormal; Notable for the following components:    Alcohol, Serum 36 (*)     All other components within normal limits   MAGNESIUM   DRUG SCREEN PANEL, URINE EMERGENCY           Imaging Results              CT Head Without Contrast (Final result)  Result time 08/23/23 07:32:46      Final result by Shaheed Angela MD (08/23/23 07:32:46)                   Impression:      No acute intracranial findings.      Electronically signed by: Shaheed Angela  Date:    08/23/2023  Time:    07:32               Narrative:    EXAMINATION:  CT HEAD WITHOUT CONTRAST    CLINICAL HISTORY:  Head trauma, intracranial venous injury suspected; Unspecified injury of head, initial encounter    TECHNIQUE:  Low dose axial images were obtained through the head.  Coronal and sagittal reformations were also performed. Contrast was not administered.    COMPARISON:  CT head 07/08/2023    FINDINGS:  Blood: No acute intracranial hemorrhage.    Parenchyma: No definite loss of gray-white differentiation to suggest acute or subacute transcortical infarct. Generalized pattern of age-related parenchymal volume loss.  Nonspecific areas of white matter hypoattenuation may relate to sequela of chronic small vessel ischemic disease.    Ventricles/Extra-axial spaces: No abnormal extra-axial fluid collection. Basal cisterns are patent.    Vessels: Heavy atherosclerotic calcification.    Orbits: Unremarkable.    Scalp: Unremarkable.    Skull: There are no depressed skull fractures or destructive bone lesions.    Sinuses and mastoids: Relatively minor paranasal sinus mucosal thickening with small retention cysts.    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                                       Medications   levETIRAcetam injection 1,000 mg (1,000 mg Intravenous Given 8/23/23 0806)   LORazepam injection 1 mg (1 mg Intravenous Given 8/23/23 0641)   thiamine injection 100 mg (100 mg Intramuscular Given 8/23/23 0806)   multivitamin tablet (1 tablet Oral Given 8/23/23 0806)     Medical Decision Making  Amount and/or Complexity of Data Reviewed  Labs:  ordered.  Radiology: ordered.    Risk  Prescription drug management.    Given the above, this patient presents emergency room with a history of alcoholism, delirium tremens, seizures.  The patient had a seizure in the field today.  He has been prescribed Keppra in the past.  He reports he is not taking his Keppra.  The patient had a seizure in the emergency room.  He has good blood glucose.  His alcohol level is very low.  Withdrawal is considered.  At the same time patient is really not very hypertensive.  He is not tachycardic his heart rate is 74.  His sodium is 125 in his CO2 is low at 11.  I am worried about the low-sodium associated with a seizure.  The patient has multiple reasons to have seizures.  I treated him with a g of Keppra in the emergency room.  He was also given a mg of Ativan for an active seizure here.  Diagnostic evaluation is interesting for the absence of leukocytosis.  Bacterial infection seems unlikely.  The patient has a mild anemia with a hemoglobin of 10 this is baseline for this patient.  He is sodium is 125 as previously discussed.  The CO2 is low at 11 suggesting a metabolic acidosis likely from alcohol.  His calcium is 8.3.  The albumin is low at 3.2.  The patient's alcohol level is 36 this could be low enough to suggest withdrawal.  A CT head is negative.  The patient has an occipital contusion of the right side no intracranial bleeding.  I will consult hospital medicine for observation.   Complicated correcting the patient's sodium with his congestive heart failure.  The patient is on Entresto.  He has an ejection fraction of 40% and grade 2 diastolic dysfunction.  He is eating.  His magnesium is 2.0.  He is a slightly low albumin.  I will place this patient to hospital medicine for fluid restriction and consideration of 3% saline for treatment.   The patient will be admitted to hospital medicine.        Scribe Attestation:   Scribe #1: I performed the above scribed service and the  documentation accurately describes the services I performed. I attest to the accuracy of the note.                      I personally performed the services described in this documentation.  All medical record  entries made by the scribe are at my direction and in my presence.  Signed, Dr. Bowie  Clinical Impression:   Final diagnoses:  [S09.90XA] Closed head injury  [E87.1] Hyponatremia (Primary)  [E87.20] Metabolic acidosis  [F10.21] History of alcoholism  [R56.9] Seizure  [G40.909] Seizure disorder        ED Disposition Condition    Admit Stable                Antonio Bowie MD  08/23/23 0881

## 2023-08-23 NOTE — HPI
Mr. Jiménez is a 62 yo M who presents to the ED with a CC of seizure at home reported by the family.  Of note, patient not very interested in participating in the interview. He evidently hit his head (CT head negative). The patient has known history of ETOH dependence and is on Keppra at home. He presents and is found to have a NA of 125. Patient started on IV fluids and IV keppra. He has a history of ETOH withdrawal and was started on scheduled Valium. He was + for ETOH in the ED. He also has a history of cocaine use- he denies recent use but drug screen pending.

## 2023-08-23 NOTE — PHARMACY MED REC
"  Admission Medication History     The home medication history was taken by Laila Epstein CPhT.      You may go to "Admission" then "Reconcile Home Medications" tabs to review and/or act upon these items.     The home medication list has been updated by the Pharmacy department.   Please read ALL comments highlighted in yellow.   Please address this information as you see fit.    Feel free to contact us if you have any questions or require assistance.      The medications listed below were removed from the home medication list. Please reorder if appropriate:  Patient reports no longer taking the following medication(s):  Valium 5 mg tab      Medications listed below were obtained from: Patient/family and Analytic software- [employer]  (Not in a hospital admission)        Patient stated he took 3 pills does not know the name or dosage.     Engagement was limited while trying to conduct a medication history.    Laila Epstein CPhT.  [phone]                .        "

## 2023-08-23 NOTE — SUBJECTIVE & OBJECTIVE
Past Medical History:   Diagnosis Date    Delirium tremens 7/13/2023    Seizures        History reviewed. No pertinent surgical history.    Review of patient's allergies indicates:  No Known Allergies    No current facility-administered medications on file prior to encounter.     Current Outpatient Medications on File Prior to Encounter   Medication Sig    aspirin 81 MG Chew Chew and swallow 1 tablet (81 mg total) by mouth once daily.    clopidogreL (PLAVIX) 75 mg tablet Take 1 tablet (75 mg total) by mouth once daily.    dapagliflozin propanediol (FARXIGA) 10 mg tablet Take 1 tablet (10 mg total) by mouth once daily.    diazePAM (VALIUM) 5 MG tablet Take 2 tablets (10 mg total) by mouth 2 (two) times a day for 1 day, THEN 1 tablet (5 mg total) 2 (two) times a day for 1 day, THEN 1 tablet (5 mg total) once daily for 1 day.    levETIRAcetam (KEPPRA) 250 MG Tab Take 2 tablets (500 mg total) by mouth 2 (two) times daily.    sacubitriL-valsartan (ENTRESTO) 24-26 mg per tablet Take 1 tablet by mouth 2 (two) times daily.     Family History       Problem Relation (Age of Onset)    Cancer Mother, Father          Tobacco Use    Smoking status: Every Day     Current packs/day: 0.00     Types: Cigarettes    Smokeless tobacco: Not on file   Substance and Sexual Activity    Alcohol use: Yes     Comment: 3 quarts of beer daily    Drug use: Not Currently    Sexual activity: Not on file     Review of Systems   Constitutional:  Negative for activity change.   HENT:  Negative for congestion.    Eyes:  Negative for discharge.   Respiratory:  Negative for chest tightness.    Gastrointestinal:  Negative for abdominal distention.   Endocrine: Negative for cold intolerance.   Genitourinary:  Negative for difficulty urinating.   Neurological:  Positive for seizures. Negative for dizziness.     Objective:     Vital Signs (Most Recent):  Temp: 98.6 °F (37 °C) (08/23/23 0608)  Pulse: 64 (08/23/23 0802)  Resp: 20 (08/23/23 0802)  BP: (!)  123/59 (08/23/23 0802)  SpO2: 98 % (08/23/23 0608) Vital Signs (24h Range):  Temp:  [98.6 °F (37 °C)] 98.6 °F (37 °C)  Pulse:  [64-74] 64  Resp:  [18-20] 20  SpO2:  [98 %] 98 %  BP: (123-162)/(59-62) 123/59     Weight: 74.8 kg (165 lb)  Body mass index is 25.09 kg/m².     Physical Exam  Vitals and nursing note reviewed.   Constitutional:       Appearance: Normal appearance. He is not ill-appearing.   Cardiovascular:      Rate and Rhythm: Normal rate and regular rhythm.   Pulmonary:      Effort: Pulmonary effort is normal. No respiratory distress.   Neurological:      Mental Status: He is alert.   Psychiatric:         Mood and Affect: Mood normal.                Significant Labs: All pertinent labs within the past 24 hours have been reviewed.  BMP:   Recent Labs   Lab 08/23/23  0641   GLU 89   *   K 4.0   CL 98   CO2 11*   BUN 10   CREATININE 0.8   CALCIUM 8.3*   MG 2.0     CBC:   Recent Labs   Lab 08/23/23  0641   WBC 5.28   HGB 10.6*   HCT 30.9*   *       Significant Imaging: I have reviewed all pertinent imaging results/findings within the past 24 hours.

## 2023-08-23 NOTE — H&P
[facility]  [facility] Medicine  History & Physical    Patient Name: Paty Jiménez  MRN: [id number]  Patient Class: IP- Inpatient  Admission Date: 8/23/2023  Attending Physician: Hay Montoya MD   Primary Care Provider: Unable, To Obtain         Patient information was obtained from patient and ER records.     Subjective:     Principal Problem:Hyponatremia    Chief Complaint:   Chief Complaint   Patient presents with    Seizures     BIB [place] [employer], pt had witnessed seizure under 1 minute, hx of seizure noncompliant with meds, pos etoh. Postitcal. Abrasion to knee and hematoma         HPI: Mr. Jiménez is a 64 yo M who presents to the ED with a CC of seizure at home reported by the family.  Of note, patient not very interested in participating in the interview. He evidently hit his head (CT head negative). The patient has known history of ETOH dependence and is on Keppra at home. He presents and is found to have a NA of 125. Patient started on IV fluids and IV keppra. He has a history of ETOH withdrawal and was started on scheduled Valium. He was + for ETOH in the ED. He also has a history of cocaine use- he denies recent use but drug screen pending.        Past Medical History:   Diagnosis Date    Delirium tremens 7/13/2023    Seizures        History reviewed. No pertinent surgical history.    Review of patient's allergies indicates:  No Known Allergies    No current facility-administered medications on file prior to encounter.     Current Outpatient Medications on File Prior to Encounter   Medication Sig    aspirin 81 MG Chew Chew and swallow 1 tablet (81 mg total) by mouth once daily.    clopidogreL (PLAVIX) 75 mg tablet Take 1 tablet (75 mg total) by mouth once daily.    dapagliflozin propanediol (FARXIGA) 10 mg tablet Take 1 tablet (10 mg total) by mouth once daily.    diazePAM (VALIUM) 5 MG tablet Take 2 tablets (10 mg total) by mouth 2 (two) times a day for 1 day, THEN 1 tablet (5 mg  total) 2 (two) times a day for 1 day, THEN 1 tablet (5 mg total) once daily for 1 day.    levETIRAcetam (KEPPRA) 250 MG Tab Take 2 tablets (500 mg total) by mouth 2 (two) times daily.    sacubitriL-valsartan (ENTRESTO) 24-26 mg per tablet Take 1 tablet by mouth 2 (two) times daily.     Family History       Problem Relation (Age of Onset)    Cancer Mother, Father          Tobacco Use    Smoking status: Every Day     Current packs/day: 0.00     Types: Cigarettes    Smokeless tobacco: Not on file   Substance and Sexual Activity    Alcohol use: Yes     Comment: 3 quarts of beer daily    Drug use: Not Currently    Sexual activity: Not on file     Review of Systems   Constitutional:  Negative for activity change.   HENT:  Negative for congestion.    Eyes:  Negative for discharge.   Respiratory:  Negative for chest tightness.    Gastrointestinal:  Negative for abdominal distention.   Endocrine: Negative for cold intolerance.   Genitourinary:  Negative for difficulty urinating.   Neurological:  Positive for seizures. Negative for dizziness.     Objective:     Vital Signs (Most Recent):  Temp: 98.6 °F (37 °C) (08/23/23 0608)  Pulse: 64 (08/23/23 0802)  Resp: 20 (08/23/23 0802)  BP: (!) 123/59 (08/23/23 0802)  SpO2: 98 % (08/23/23 0608) Vital Signs (24h Range):  Temp:  [98.6 °F (37 °C)] 98.6 °F (37 °C)  Pulse:  [64-74] 64  Resp:  [18-20] 20  SpO2:  [98 %] 98 %  BP: (123-162)/(59-62) 123/59     Weight: 74.8 kg (165 lb)  Body mass index is 25.09 kg/m².     Physical Exam  Vitals and nursing note reviewed.   Constitutional:       Appearance: Normal appearance. He is not ill-appearing.   Cardiovascular:      Rate and Rhythm: Normal rate and regular rhythm.   Pulmonary:      Effort: Pulmonary effort is normal. No respiratory distress.   Neurological:      Mental Status: He is alert.   Psychiatric:         Mood and Affect: Mood normal.                Significant Labs: All pertinent labs within the past 24 hours have been  reviewed.  BMP:   Recent Labs   Lab 08/23/23  0641   GLU 89   *   K 4.0   CL 98   CO2 11*   BUN 10   CREATININE 0.8   CALCIUM 8.3*   MG 2.0     CBC:   Recent Labs   Lab 08/23/23  0641   WBC 5.28   HGB 10.6*   HCT 30.9*   *       Significant Imaging: I have reviewed all pertinent imaging results/findings within the past 24 hours.    Assessment/Plan:     * Hyponatremia  Patient has hyponatremia which is uncontrolled,We will aim to correct the sodium by 4-6mEq in 24 hours. We will monitor sodium Every 8 hours. The hyponatremia is due to Alcohol abuse. We will obtain the following studies:We will treat the hyponatremia with   aThe patient's sodium results have been reviewed and are listed below.  Recent Labs   Lab 08/23/23  0641   *     Gentle IV hydration- has history of CHF    Metabolic acidosis  From alcohol abuse. Should improve with IV fluids       Pulmonary hypertension  PA pressure of 47      Chronic combined systolic and diastolic heart failure  EF of 40% on recent echo      Cocaine use  Will check drug screen      HTN (hypertension)  Benign essential.       Seizure disorder  Likely related to Alcohol. Na of 125 could have triggered this as well.  IV Keppra       Tobacco use disorder  Tobacco dependence- smoking cessation education > 4 minutes       Anemia, chronic disease  No acute issues      Alcohol abuse  + ETOH on labs. Discussed with patient.  On scheduled valium         VTE Risk Mitigation (From admission, onward)         Ordered     enoxaparin injection 40 mg  Daily         08/23/23 0882                           Hay Cleveland MD  Department of Hospital Medicine  [facility] [place] - Emergency Dept

## 2023-08-23 NOTE — ED TRIAGE NOTES
Pt presents to ER via EMS after a witnessed seizure by family. Family states pt is non compliant with his meds. Pt has no complaints at this time. Pt AAOx4.   MD at bedside to discuss POC with pt.

## 2023-08-23 NOTE — NURSING
[facility] [place]  Nurses Note -- 4 Eyes      8/23/2023       Skin assessed on: Admit      [x] No Pressure Injuries Present    [x]Prevention Measures Documented    [] Yes LDA  for Pressure Injury Previously documented     [] Yes New Pressure Injury Discovered   [] LDA for New Pressure Injury Added      Attending RN:  Horace Copeland, RN     Second RN:  Sophia ASTUDILLO LPN

## 2023-08-24 VITALS
WEIGHT: 151.25 LBS | TEMPERATURE: 99 F | BODY MASS INDEX: 25.82 KG/M2 | DIASTOLIC BLOOD PRESSURE: 74 MMHG | RESPIRATION RATE: 17 BRPM | HEART RATE: 52 BPM | HEIGHT: 64 IN | SYSTOLIC BLOOD PRESSURE: 169 MMHG | OXYGEN SATURATION: 96 %

## 2023-08-24 LAB
ANION GAP SERPL CALC-SCNC: 11 MMOL/L (ref 8–16)
ANION GAP SERPL CALC-SCNC: 7 MMOL/L (ref 8–16)
BUN SERPL-MCNC: 10 MG/DL (ref 8–23)
BUN SERPL-MCNC: 11 MG/DL (ref 8–23)
CALCIUM SERPL-MCNC: 8.9 MG/DL (ref 8.7–10.5)
CALCIUM SERPL-MCNC: 8.9 MG/DL (ref 8.7–10.5)
CHLORIDE SERPL-SCNC: 104 MMOL/L (ref 95–110)
CHLORIDE SERPL-SCNC: 105 MMOL/L (ref 95–110)
CO2 SERPL-SCNC: 18 MMOL/L (ref 23–29)
CO2 SERPL-SCNC: 21 MMOL/L (ref 23–29)
CREAT SERPL-MCNC: 0.7 MG/DL (ref 0.5–1.4)
CREAT SERPL-MCNC: 0.7 MG/DL (ref 0.5–1.4)
EST. GFR  (NO RACE VARIABLE): >60 ML/MIN/1.73 M^2
EST. GFR  (NO RACE VARIABLE): >60 ML/MIN/1.73 M^2
GLUCOSE SERPL-MCNC: 80 MG/DL (ref 70–110)
GLUCOSE SERPL-MCNC: 91 MG/DL (ref 70–110)
POTASSIUM SERPL-SCNC: 3.9 MMOL/L (ref 3.5–5.1)
POTASSIUM SERPL-SCNC: 4.2 MMOL/L (ref 3.5–5.1)
SODIUM SERPL-SCNC: 132 MMOL/L (ref 136–145)
SODIUM SERPL-SCNC: 134 MMOL/L (ref 136–145)

## 2023-08-24 PROCEDURE — 36415 COLL VENOUS BLD VENIPUNCTURE: CPT | Performed by: INTERNAL MEDICINE

## 2023-08-24 PROCEDURE — 80048 BASIC METABOLIC PNL TOTAL CA: CPT | Performed by: INTERNAL MEDICINE

## 2023-08-24 PROCEDURE — 63600175 PHARM REV CODE 636 W HCPCS: Performed by: EMERGENCY MEDICINE

## 2023-08-24 PROCEDURE — 80048 BASIC METABOLIC PNL TOTAL CA: CPT | Mod: 91 | Performed by: INTERNAL MEDICINE

## 2023-08-24 PROCEDURE — 25000003 PHARM REV CODE 250: Performed by: INTERNAL MEDICINE

## 2023-08-24 RX ADMIN — DIAZEPAM 10 MG: 5 TABLET ORAL at 06:08

## 2023-08-24 RX ADMIN — ASPIRIN 81 MG CHEWABLE TABLET 81 MG: 81 TABLET CHEWABLE at 08:08

## 2023-08-24 RX ADMIN — SACUBITRIL AND VALSARTAN 1 TABLET: 24; 26 TABLET, FILM COATED ORAL at 08:08

## 2023-08-24 RX ADMIN — LEVETIRACETAM 1000 MG: 100 INJECTION, SOLUTION INTRAVENOUS at 08:08

## 2023-08-24 RX ADMIN — CLOPIDOGREL BISULFATE 75 MG: 75 TABLET ORAL at 08:08

## 2023-08-24 NOTE — SUBJECTIVE & OBJECTIVE
Interval History  No new issues     Review of Systems   Constitutional:  Negative for activity change.   HENT:  Negative for congestion.    Eyes:  Negative for discharge.   Respiratory:  Negative for chest tightness.    Gastrointestinal:  Negative for abdominal distention.   Endocrine: Negative for cold intolerance.   Genitourinary:  Negative for difficulty urinating.   Neurological:  Positive for seizures. Negative for dizziness.     Objective:     Vital Signs (Most Recent):  Temp: 97.9 °F (36.6 °C) (08/24/23 0749)  Pulse: (!) 46 (08/24/23 0749)  Resp: 17 (08/24/23 0749)  BP: (!) 149/70 (08/24/23 0749)  SpO2: 100 % (08/24/23 0749) Vital Signs (24h Range):  Temp:  [97.3 °F (36.3 °C)-98.9 °F (37.2 °C)] 97.9 °F (36.6 °C)  Pulse:  [46-61] 46  Resp:  [16-17] 17  SpO2:  [97 %-100 %] 100 %  BP: (103-154)/(58-70) 149/70     Weight: 68.6 kg (151 lb 3.8 oz)  Body mass index is 23 kg/m².    Intake/Output Summary (Last 24 hours) at 8/24/2023 0941  Last data filed at 8/24/2023 0825  Gross per 24 hour   Intake --   Output 1050 ml   Net -1050 ml         Physical Exam  Vitals and nursing note reviewed.   Constitutional:       Appearance: Normal appearance. He is not ill-appearing.   Cardiovascular:      Rate and Rhythm: Normal rate and regular rhythm.   Pulmonary:      Effort: Pulmonary effort is normal. No respiratory distress.   Neurological:      Mental Status: He is alert.   Psychiatric:         Mood and Affect: Mood normal.             Significant Labs: All pertinent labs within the past 24 hours have been reviewed.  BMP:   Recent Labs   Lab 08/23/23  0641 08/23/23  1613 08/24/23  0831   GLU 89   < > 80   *   < > 134*   K 4.0   < > 4.2   CL 98   < > 105   CO2 11*   < > 18*   BUN 10   < > 10   CREATININE 0.8   < > 0.7   CALCIUM 8.3*   < > 8.9   MG 2.0  --   --     < > = values in this interval not displayed.     CBC:   Recent Labs   Lab 08/23/23  0641   WBC 5.28   HGB 10.6*   HCT 30.9*   *       Significant  Imaging: I have reviewed all pertinent imaging results/findings within the past 24 hours.

## 2023-08-24 NOTE — PLAN OF CARE
CM notified nurseHorace that pt is ready for discharge from CM standpoint. All CM needs met.     08/24/23 1046   Final Note   Assessment Type Final Discharge Note   Anticipated Discharge Disposition Home   What phone number can be called within the next 1-3 days to see how you are doing after discharge? [phone]   Hospital Resources/Appts/Education Provided Appointments scheduled and added to AVS   Post-Acute Status   Coverage [facility] [employer]- Medicaid   Discharge Delays None known at this time

## 2023-08-24 NOTE — NURSING
Report received care assumed per this nurse, pt found resting in bed , IVF cont infusing safety measures in place, bed low locked and in position, call light in reach nadn

## 2023-08-24 NOTE — PLAN OF CARE
Problem: Skin Injury Risk Increased  Goal: Skin Health and Integrity  Intervention: Optimize Skin Protection  Flowsheets (Taken 8/24/2023 0306)  Pressure Reduction Techniques:   frequent weight shift encouraged   pressure points protected  Skin Protection: incontinence pads utilized  Intervention: Promote and Optimize Oral Intake  Flowsheets (Taken 8/24/2023 0306)  Oral Nutrition Promotion:   rest periods promoted   physical activity promoted

## 2023-08-24 NOTE — ASSESSMENT & PLAN NOTE
Patient has hyponatremia which is uncontrolled,We will aim to correct the sodium by 4-6mEq in 24 hours. We will monitor sodium Every 8 hours. The hyponatremia is due to Alcohol abuse. We will obtain the following studies:We will treat the hyponatremia with   aThe patient's sodium results have been reviewed and are listed below.  Recent Labs   Lab 08/24/23  0831   *     Gentle IV hydration- has history of CHF  Resolved. Will d/c to home

## 2023-08-24 NOTE — PLAN OF CARE
SABRA scheduled neurology appointment with Dr. Boss, November 2023. Message sent to clinic to get an earlier appointment. Clinic will reach out to patient.

## 2023-08-24 NOTE — NURSING
[facility] [place]  Nurses Note -- 4 Eyes      8/23/2023      Skin assessed on: Q Shift      [x] No Pressure Injuries Present    [x]Prevention Measures Documented    [] Yes LDA  for Pressure Injury Previously documented     [] Yes New Pressure Injury Discovered   [] LDA for New Pressure Injury Added      Attending RN:  Kandi Man RN     Second RN:  Horace PINEDO RN

## 2023-08-24 NOTE — PLAN OF CARE
Case Management Assessment     PCP: [facility] -      Pharmacy:   [id number] - [place], [place] - [address] [facility] [address] [facility] & [address] [place] [address]  Phone: [phone] Fax: [phone]    Patient Arrived From: Home  Existing Help at Home: Sister    Barriers to Discharge: Currently patient does not have barriers to discharge. Patient lives alone, but her sister is her help at home. Patient also has a son that helps. Patient does not have any medical equipment. Patient does not have any home services.     Discharge Plan:    A. Home   B. Home       08/24/23 0941   Discharge Assessment   Assessment Type Discharge Planning Assessment   Confirmed/corrected address, phone number and insurance Yes   Confirmed Demographics Correct on Facesheet   Source of Information health record   When was your last doctors appointment? 05/16/23   Communicated BROOKE with patient/caregiver Date not available/Unable to determine   Reason For Admission Hyponatremia   People in Home alone   Do you expect to return to your current living situation? Yes   Do you have help at home or someone to help you manage your care at home? Yes   Who are your caregiver(s) and their phone number(s)? Siobhan Xiao (Sister)   [phone] (Mobile)   Prior to hospitilization cognitive status: Alert/Oriented   Current cognitive status: Alert/Oriented   Equipment Currently Used at Home none   Readmission within 30 days? Yes  (7/8 @ [facility] - Seizure)   Patient currently being followed by outpatient case management? No   Do you currently have service(s) that help you manage your care at home? No   Do you take prescription medications? Yes   Do you have prescription coverage? Yes   Coverage Medicaid   Do you have any problems affording any of your prescribed medications? No   Is the patient taking medications as prescribed? yes   Who is going to help you get home at discharge? Siobhan Xiao (Sister)    [phone] (Mobile)   How do you get to doctors appointments? car, drives self   Are you on dialysis? No   Do you take coumadin? No   DME Needed Upon Discharge  none   Transition of Care Barriers None   Discharge Plan A Home   Discharge Plan B Home

## 2023-08-24 NOTE — NURSING
AVS virtually reviewed with patient in its entirety with emphasis on medications, follow-up appointments and reasons to return to the ED or contact the [facility] Nurse Care Line. Education complete and patient voiced understanding. All questions answered. Discharge teaching complete.

## 2023-08-24 NOTE — HOSPITAL COURSE
Patient admitted for a seizure- found to be ETOH + and also hyponatremia at 125. He improved to 134 over 24 hours with no seizures and will be discharged to home today. Activity as tolerated. Diet- regular. ETOH cessation. Already on Keppra. Follow up neurology in one week

## 2023-08-24 NOTE — DISCHARGE SUMMARY
[facility] Medicine  Discharge Summary      Patient Name: Paty Jiménez  MRN: [id number]  [facility]: [id number]  Patient Class: IP- Inpatient  Admission Date: 8/23/2023  Hospital Length of Stay: 1 days  Discharge Date and Time:  08/24/2023 9:43 AM  Attending Physician: Hay Montoya MD   Discharging Provider: Hay Montoya MD  Primary Care Provider: Unable, To Obtain    Primary Care Team: Networked reference to record PCT     HPI:   Mr. Jiménez is a 64 yo M who presents to the ED with a CC of seizure at home reported by the family.  Of note, patient not very interested in participating in the interview. He evidently hit his head (CT head negative). The patient has known history of ETOH dependence and is on Keppra at home. He presents and is found to have a NA of 125. Patient started on IV fluids and IV keppra. He has a history of ETOH withdrawal and was started on scheduled Valium. He was + for ETOH in the ED. He also has a history of cocaine use- he denies recent use but drug screen pending.        * No surgery found *      Hospital Course:   Patient admitted for a seizure- found to be ETOH + and also hyponatremia at 125. He improved to 134 over 24 hours with no seizures and will be discharged to home today. Activity as tolerated. Diet- regular. ETOH cessation. Already on Keppra. Follow up neurology in one week       Goals of Care Treatment Preferences:  Code Status: Full Code      Consults:     No new Assessment & Plan notes have been filed under this hospital service since the last note was generated.  Service: Hospital Medicine    Final Active Diagnoses:    Diagnosis Date Noted POA    PRINCIPAL PROBLEM:  Hyponatremia [E87.1] 07/15/2022 Yes    Chronic combined systolic and diastolic heart failure [I50.42] 08/23/2023 Yes    Pulmonary hypertension [I27.20] 08/23/2023 Yes    Metabolic acidosis [E87.20] 08/23/2023 Yes    Cocaine use [F14.90] 07/13/2023 Yes    HTN (hypertension) [I10]  01/23/2023 Yes     Chronic    Seizure disorder [G40.909] 01/22/2023 Yes     Chronic    Alcohol abuse [F10.10] 07/15/2022 Yes     Chronic    Anemia, chronic disease [D63.8] 07/15/2022 Yes     Chronic    Tobacco use disorder [F17.200] 07/15/2022 Yes     Chronic      Problems Resolved During this Admission:       Discharged Condition: good    Disposition:  home     Follow Up:   Follow-up Information     Surya Vallejo MD Follow up in 1 week(s).    Specialty: Neurology  Contact information:  [address] [facility] [address]  [address]  [place] [id number] [phone]                       Patient Instructions:   No discharge procedures on file.    Significant Diagnostic Studies: N/A    Pending Diagnostic Studies:     None         Medications:  Reconciled Home Medications:      Medication List      CONTINUE taking these medications    aspirin 81 MG Chew  Chew and swallow 1 tablet (81 mg total) by mouth once daily.     atorvastatin 20 MG tablet  Commonly known as: LIPITOR  Take 20 mg by mouth once daily.     clopidogreL 75 mg tablet  Commonly known as: PLAVIX  Take 1 tablet (75 mg total) by mouth once daily.     dapagliflozin propanediol 10 mg tablet  Commonly known as: FARXIGA  Take 1 tablet (10 mg total) by mouth once daily.     diazePAM 5 MG tablet  Commonly known as: VALIUM  Take 2 tablets (10 mg total) by mouth 2 (two) times a day for 1 day, THEN 1 tablet (5 mg total) 2 (two) times a day for 1 day, THEN 1 tablet (5 mg total) once daily for 1 day.  Start taking on: July 14, 2023     levETIRAcetam 250 MG Tab  Commonly known as: KEPPRA  Take 2 tablets (500 mg total) by mouth 2 (two) times daily.     sacubitriL-valsartan 24-26 mg per tablet  Commonly known as: ENTRESTO  Take 1 tablet by mouth 2 (two) times daily.            Indwelling Lines/Drains at time of discharge:   Lines/Drains/Airways     None                 Time spent on the discharge of patient: > 35 minutes         Hay Cleveland MD  Department of Hospital  Medicine  [facility]

## 2023-08-24 NOTE — NURSING
[facility] [place]  Nurses Note -- 4 Eyes      8/24/2023       Skin assessed on: Q Shift      [x] No Pressure Injuries Present    [x]Prevention Measures Documented    [] Yes LDA  for Pressure Injury Previously documented     [] Yes New Pressure Injury Discovered   [] LDA for New Pressure Injury Added      Attending RN:  Horace Copeland, RN     Second RN:  Jailene RUBIO RN

## 2023-08-24 NOTE — PROGRESS NOTES
[facility] Medicine  Progress Note    Patient Name: Paty Jiménez  MRN: [id number]  Patient Class: IP- Inpatient   Admission Date: 8/23/2023  Length of Stay: 1 days  Attending Physician: Hay Montoya MD  Primary Care Provider: Unable, To Obtain        Subjective:     Principal Problem:Hyponatremia        HPI:  Mr. Jiménez is a 62 yo M who presents to the ED with a CC of seizure at home reported by the family.  Of note, patient not very interested in participating in the interview. He evidently hit his head (CT head negative). The patient has known history of ETOH dependence and is on Keppra at home. He presents and is found to have a NA of 125. Patient started on IV fluids and IV keppra. He has a history of ETOH withdrawal and was started on scheduled Valium. He was + for ETOH in the ED. He also has a history of cocaine use- he denies recent use but drug screen pending.        Overview/Hospital Course:  Patient admitted for a seizure- found to be ETOH + and also hyponatremia at 125. He improved to 134 over 24 hours with no seizures and will be discharged to home today. Activity as tolerated. Diet- regular. ETOH cessation. Already on Keppra. Follow up neurology in one week      Interval History  No new issues     Review of Systems   Constitutional:  Negative for activity change.   HENT:  Negative for congestion.    Eyes:  Negative for discharge.   Respiratory:  Negative for chest tightness.    Gastrointestinal:  Negative for abdominal distention.   Endocrine: Negative for cold intolerance.   Genitourinary:  Negative for difficulty urinating.   Neurological:  Positive for seizures. Negative for dizziness.     Objective:     Vital Signs (Most Recent):  Temp: 97.9 °F (36.6 °C) (08/24/23 0749)  Pulse: (!) 46 (08/24/23 0749)  Resp: 17 (08/24/23 0749)  BP: (!) 149/70 (08/24/23 0749)  SpO2: 100 % (08/24/23 0749) Vital Signs (24h Range):  Temp:  [97.3 °F (36.3 °C)-98.9 °F (37.2 °C)] 97.9 °F (36.6  °C)  Pulse:  [46-61] 46  Resp:  [16-17] 17  SpO2:  [97 %-100 %] 100 %  BP: (103-154)/(58-70) 149/70     Weight: 68.6 kg (151 lb 3.8 oz)  Body mass index is 23 kg/m².    Intake/Output Summary (Last 24 hours) at 8/24/2023 0941  Last data filed at 8/24/2023 0825  Gross per 24 hour   Intake --   Output 1050 ml   Net -1050 ml         Physical Exam  Vitals and nursing note reviewed.   Constitutional:       Appearance: Normal appearance. He is not ill-appearing.   Cardiovascular:      Rate and Rhythm: Normal rate and regular rhythm.   Pulmonary:      Effort: Pulmonary effort is normal. No respiratory distress.   Neurological:      Mental Status: He is alert.   Psychiatric:         Mood and Affect: Mood normal.             Significant Labs: All pertinent labs within the past 24 hours have been reviewed.  BMP:   Recent Labs   Lab 08/23/23  0641 08/23/23  1613 08/24/23  0831   GLU 89   < > 80   *   < > 134*   K 4.0   < > 4.2   CL 98   < > 105   CO2 11*   < > 18*   BUN 10   < > 10   CREATININE 0.8   < > 0.7   CALCIUM 8.3*   < > 8.9   MG 2.0  --   --     < > = values in this interval not displayed.     CBC:   Recent Labs   Lab 08/23/23  0641   WBC 5.28   HGB 10.6*   HCT 30.9*   *       Significant Imaging: I have reviewed all pertinent imaging results/findings within the past 24 hours.      Assessment/Plan:      * Hyponatremia  Patient has hyponatremia which is uncontrolled,We will aim to correct the sodium by 4-6mEq in 24 hours. We will monitor sodium Every 8 hours. The hyponatremia is due to Alcohol abuse. We will obtain the following studies:We will treat the hyponatremia with   aThe patient's sodium results have been reviewed and are listed below.  Recent Labs   Lab 08/24/23  0831   *     Gentle IV hydration- has history of CHF  Resolved. Will d/c to home     Metabolic acidosis  From alcohol abuse. Should improve with IV fluids       Pulmonary hypertension  PA pressure of 47      Chronic combined systolic  and diastolic heart failure  EF of 40% on recent echo      Cocaine use  Will check drug screen      HTN (hypertension)  Benign essential.       Seizure disorder  Likely related to Alcohol. Na of 125 could have triggered this as well.  IV Keppra   Already on home keppra   Follow up with neuro    Tobacco use disorder  Tobacco dependence- smoking cessation education > 4 minutes       Anemia, chronic disease  No acute issues      Alcohol abuse  + ETOH on labs. Discussed with patient.  On scheduled valium         VTE Risk Mitigation (From admission, onward)         Ordered     enoxaparin injection 40 mg  Daily         08/23/23 0845                Discharge Planning   BROOKE:      Code Status: Full Code   Is the patient medically ready for discharge?:     Reason for patient still in hospital (select all that apply):                      Hay Cleveland MD  Department of Hospital Medicine   [facility] - Telemetry

## 2023-08-24 NOTE — ASSESSMENT & PLAN NOTE
Likely related to Alcohol. Na of 125 could have triggered this as well.  IV Keppra   Already on home keppra   Follow up with neuro

## 2023-08-25 ENCOUNTER — PATIENT OUTREACH (OUTPATIENT)
Dept: ADMINISTRATIVE | Facility: CLINIC | Age: 63
End: 2023-08-25
Payer: MEDICAID

## 2023-08-25 NOTE — PROGRESS NOTES
C3 nurse attempted to contact Paty Jiménez for a TCC post hospital discharge follow up call. No answer. The patient does not have a scheduled HOSFU appointment, and the pt does not have an [facility] PCP.

## 2023-08-28 NOTE — PROGRESS NOTES
C3 nurse spoke with Paty Jiménez for a TCC post hospital discharge follow up call. The patient has a scheduled [facility] appointment with Georgia Antunez NP on 09/07/2023 @ 0800.

## 2023-09-07 ENCOUNTER — OFFICE VISIT (OUTPATIENT)
Dept: HOME HEALTH SERVICES | Facility: CLINIC | Age: 63
End: 2023-09-07
Payer: MEDICAID

## 2023-09-07 VITALS
SYSTOLIC BLOOD PRESSURE: 140 MMHG | RESPIRATION RATE: 16 BRPM | OXYGEN SATURATION: 99 % | TEMPERATURE: 99 F | DIASTOLIC BLOOD PRESSURE: 75 MMHG | HEART RATE: 73 BPM

## 2023-09-07 DIAGNOSIS — R56.9 ALCOHOL WITHDRAWAL SEIZURE WITH DELIRIUM: ICD-10-CM

## 2023-09-07 DIAGNOSIS — E87.1 HYPONATREMIA: ICD-10-CM

## 2023-09-07 DIAGNOSIS — F17.210 CIGARETTE NICOTINE DEPENDENCE WITHOUT COMPLICATION: Primary | ICD-10-CM

## 2023-09-07 DIAGNOSIS — F10.931 ALCOHOL WITHDRAWAL SEIZURE WITH DELIRIUM: ICD-10-CM

## 2023-09-07 DIAGNOSIS — F10.10 ALCOHOL ABUSE: Chronic | ICD-10-CM

## 2023-09-07 PROCEDURE — 1160F PR REVIEW ALL MEDS BY PRESCRIBER/CLIN PHARMACIST DOCUMENTED: ICD-10-PCS | Mod: CPTII,S$GLB,, | Performed by: NURSE PRACTITIONER

## 2023-09-07 PROCEDURE — 3078F PR MOST RECENT DIASTOLIC BLOOD PRESSURE < 80 MM HG: ICD-10-PCS | Mod: CPTII,S$GLB,, | Performed by: NURSE PRACTITIONER

## 2023-09-07 PROCEDURE — 1111F DSCHRG MED/CURRENT MED MERGE: CPT | Mod: CPTII,S$GLB,, | Performed by: NURSE PRACTITIONER

## 2023-09-07 PROCEDURE — 3044F PR MOST RECENT HEMOGLOBIN A1C LEVEL <7.0%: ICD-10-PCS | Mod: CPTII,S$GLB,, | Performed by: NURSE PRACTITIONER

## 2023-09-07 PROCEDURE — 99495 TCM SERVICES (MODERATE COMPLEXITY): ICD-10-PCS | Mod: 25,S$GLB,, | Performed by: NURSE PRACTITIONER

## 2023-09-07 PROCEDURE — 3044F HG A1C LEVEL LT 7.0%: CPT | Mod: CPTII,S$GLB,, | Performed by: NURSE PRACTITIONER

## 2023-09-07 PROCEDURE — 3077F SYST BP >= 140 MM HG: CPT | Mod: CPTII,S$GLB,, | Performed by: NURSE PRACTITIONER

## 2023-09-07 PROCEDURE — 1111F PR DISCHARGE MEDS RECONCILED W/ CURRENT OUTPATIENT MED LIST: ICD-10-PCS | Mod: CPTII,S$GLB,, | Performed by: NURSE PRACTITIONER

## 2023-09-07 PROCEDURE — 99406 BEHAV CHNG SMOKING 3-10 MIN: CPT | Mod: S$GLB,,, | Performed by: NURSE PRACTITIONER

## 2023-09-07 PROCEDURE — 99406 PR TOBACCO USE CESSATION INTERMEDIATE 3-10 MINUTES: ICD-10-PCS | Mod: S$GLB,,, | Performed by: NURSE PRACTITIONER

## 2023-09-07 PROCEDURE — 4010F ACE/ARB THERAPY RXD/TAKEN: CPT | Mod: CPTII,S$GLB,, | Performed by: NURSE PRACTITIONER

## 2023-09-07 PROCEDURE — 1160F RVW MEDS BY RX/DR IN RCRD: CPT | Mod: CPTII,S$GLB,, | Performed by: NURSE PRACTITIONER

## 2023-09-07 PROCEDURE — 1159F MED LIST DOCD IN RCRD: CPT | Mod: CPTII,S$GLB,, | Performed by: NURSE PRACTITIONER

## 2023-09-07 PROCEDURE — 4010F PR ACE/ARB THEARPY RXD/TAKEN: ICD-10-PCS | Mod: CPTII,S$GLB,, | Performed by: NURSE PRACTITIONER

## 2023-09-07 PROCEDURE — 3078F DIAST BP <80 MM HG: CPT | Mod: CPTII,S$GLB,, | Performed by: NURSE PRACTITIONER

## 2023-09-07 PROCEDURE — 1159F PR MEDICATION LIST DOCUMENTED IN MEDICAL RECORD: ICD-10-PCS | Mod: CPTII,S$GLB,, | Performed by: NURSE PRACTITIONER

## 2023-09-07 PROCEDURE — 99495 TRANSJ CARE MGMT MOD F2F 14D: CPT | Mod: 25,S$GLB,, | Performed by: NURSE PRACTITIONER

## 2023-09-07 PROCEDURE — 3077F PR MOST RECENT SYSTOLIC BLOOD PRESSURE >= 140 MM HG: ICD-10-PCS | Mod: CPTII,S$GLB,, | Performed by: NURSE PRACTITIONER

## 2023-09-07 RX ORDER — ATORVASTATIN CALCIUM 20 MG/1
20 TABLET, FILM COATED ORAL DAILY
Qty: 30 TABLET | Refills: 0 | Status: SHIPPED | OUTPATIENT
Start: 2023-09-07

## 2023-09-07 RX ORDER — LEVETIRACETAM 250 MG/1
500 TABLET ORAL 2 TIMES DAILY
Qty: 120 TABLET | Refills: 0 | Status: SHIPPED | OUTPATIENT
Start: 2023-09-07 | End: 2023-10-07

## 2023-09-07 NOTE — PROGRESS NOTES
[facility]  Transitional Care Management (TCM) Home Visit    Visit Date: 9/7/2023  Encounter Provider: Anurag Pimentel   PCP: [facility] -  Consult Requested By: No ref. provider found  Hospital Follow-Up for:              Principal Problem:     Admit Date: 8/23/23  IP Discharge Date and Time: 8/24/23      Hospital Length of Stay:RRHLOS@ days  Days since discharge (from IP or SNF): 14 days    [facility] On [facility] Contact Note: 8/25/23    Patient ID: Paty Jiménez is a 63 y.o. male was recently admitted to the hospital, this is their TCM encounter.      DECISION MAKING TODAY     Assessment & Plan:  Problem List Items Addressed This Visit          Neuro    Alcohol withdrawal seizure with delirium    Current Assessment & Plan     --refilled Keppra for 30 days the patient had no medication; advised to schedule follow up with his primary care doctor as soon as possible  --denies seizures since hospital discharge            Renal/    Hyponatremia    Current Assessment & Plan     --refusing labs at this time  --last sodium level 134  --denies altered mental status/confusion/dizziness/weakness            Other    Nicotine dependence - Primary    Current Assessment & Plan     --endorses smoking 1 pack of cigarettes per week  --A consultation session <10 minutes was held with the patient discussing the impact on the patient's health and options for smoking cessation. Will continue to revisit with the patient.               Medication Reconciliation:     Medication List            Accurate as of September 7, 2023  2:42 PM. If you have any questions, ask your nurse or doctor.                CONTINUE taking these medications      aspirin 81 MG Chew  Chew and swallow 1 tablet (81 mg total) by mouth once daily.     atorvastatin 20 MG tablet  Commonly known as: LIPITOR  Take 1 tablet (20 mg total) by mouth once daily.     clopidogreL 75 mg tablet  Commonly known as: PLAVIX  Take 1 tablet (75 mg total) by mouth once  daily.     dapagliflozin propanediol 10 mg tablet  Commonly known as: FARXIGA  Take 1 tablet (10 mg total) by mouth once daily.     diazePAM 5 MG tablet  Commonly known as: VALIUM  Take 2 tablets (10 mg total) by mouth 2 (two) times a day for 1 day, THEN 1 tablet (5 mg total) 2 (two) times a day for 1 day, THEN 1 tablet (5 mg total) once daily for 1 day.  Start taking on: July 14, 2023     levETIRAcetam 250 MG Tab  Commonly known as: KEPPRA  Take 2 tablets (500 mg total) by mouth 2 (two) times daily.     sacubitriL-valsartan 24-26 mg per tablet  Commonly known as: ENTRESTO  Take 1 tablet by mouth 2 (two) times daily.              Were medications changed on discharge? Did patient have any problems filling prescriptions?  Patient claims that he did not get to of his medications; atorvastatin and Keppra ordered to local pharmacy, Are the medications present in the home?  Patient has all his medications besides Keppra and atorvastatin; see above, Is the patient taking the medication?Yes, Does the patient understand the reason for this medication?Yes, Are there meds to be resumed or stopped on discharge?Yes, and Was med list updated?Yes     HISTORY OF PRESENT ILLNESS          Family and/or Caregiver present at visit?  No  History provided by: patient  Hospital Course    Synopsis of major diagnoses, care, treatment, and services provided during the course of the hospital stay:  Seizures secondary to EtOH; hyponatremia    Developments since the hospitalization:  Patient is back to baseline.    Current needs:  Refills on x2 medications.  No additional needs at this time.  Advised to see PCP as soon as possible.    Impression upon entering the home:  Physical Dwelling: single family home   Appearance of home environment:  Visit was done outside on the patio per patient request.  Functional Status: independent  Mobility: ambulatory  Nutritional access: adequate intake and access  Home Health: No, and does not need it at this  time   DME/Supplies: none     Advanced Care Planning Status:  Patient does not have an ACP conversation on file  Living Will: No  Power of : No  [place]POST: No    What matters most to patient today is:  Continuing treatment course.    Diagnostic tests reviewed/disposition: No diagnosic tests pending after this hospitalization.  Disease/illness education:  Alcohol and cigarette abuse/use  Establishment or re-establishment of referral orders for community resources: No other necessary community resources.   Discussion with other health care providers: No discussion with other health care providers necessary.   Does patient have a PCP at [facility]? No   Repatriation plan with PCP? follow-up with PCP within 30d   Does patient have an ostomy (ileostomy, colostomy, suprapubic catheter, nephrostomy tube, tracheostomy, PEG tube, pleurex catheter, cholecystostomy, etc)? No  Were BPAs reviewed? Yes      Social History     Socioeconomic History    Marital status: Single   Tobacco Use    Smoking status: Every Day     Types: Cigarettes   Substance and Sexual Activity    Alcohol use: Yes     Comment: 3 quarts of beer daily    Drug use: Not Currently     Social Determinants of Health     Financial Resource Strain: Low Risk  (7/10/2023)    Overall Financial Resource Strain (CARDIA)     Difficulty of Paying Living Expenses: Not hard at all   Food Insecurity: No Food Insecurity (7/10/2023)    Hunger Vital Sign     Worried About Running Out of Food in the Last Year: Never true     Ran Out of Food in the Last Year: Never true   Transportation Needs: No Transportation Needs (7/10/2023)    PRAPARE - Transportation     Lack of Transportation (Medical): No     Lack of Transportation (Non-Medical): No   Physical Activity: Sufficiently Active (7/10/2023)    Exercise Vital Sign     Days of Exercise per Week: 7 days     Minutes of Exercise per Session: 60 min   Stress: No Stress Concern Present (7/10/2023)    [employer] - Occupational Stress Questionnaire     Feeling of Stress : Not at all   Social Connections: Moderately Isolated (7/10/2023)    Social Connection and Isolation Panel [NHANES]     Frequency of Communication with Friends and Family: More than three times a week     Frequency of Social Gatherings with Friends and Family: More than three times a week     Attends Yarsani Services: More than 4 times per year     Active Member of Clubs or Organizations: No     Attends Club or Organization Meetings: Never     Marital Status:    Housing Stability: Low Risk  (7/10/2023)    Housing Stability Vital Sign     Unable to Pay for Housing in the Last Year: No     Number of Places Lived in the Last Year: 2     Unstable Housing in the Last Year: No         OBJECTIVE:     Vital Signs:  Vitals:    09/07/23 0900   BP: (!) 140/75   Pulse: 73   Resp: 16   Temp: 99.2 °F (37.3 °C)       Review of Systems   Constitutional:  Negative for activity change and appetite change.   HENT:  Negative for congestion and dental problem.    Eyes:  Negative for discharge and itching.   Respiratory:  Negative for choking and chest tightness.    Cardiovascular:  Negative for chest pain and palpitations.   Gastrointestinal:  Negative for rectal pain and vomiting.   Endocrine: Negative for cold intolerance and heat intolerance.   Genitourinary:  Negative for enuresis and flank pain.   Musculoskeletal:  Negative for myalgias and neck pain.   Skin:  Negative for color change and wound.   Allergic/Immunologic: Negative for environmental allergies and food allergies.   Neurological:  Negative for tremors and syncope.   Hematological:  Does not bruise/bleed easily.   Psychiatric/Behavioral:  Negative for decreased concentration and dysphoric mood.      Physical Exam:  Physical Exam  Vitals and nursing note reviewed.   Constitutional:       Appearance: He is well-developed.   HENT:      Head: Normocephalic and atraumatic.    Eyes:      General: Lids are normal.      Pupils: Pupils are equal, round, and reactive to light.   Cardiovascular:      Rate and Rhythm: Normal rate.   Pulmonary:      Effort: Pulmonary effort is normal.      Breath sounds: Normal breath sounds and air entry.   Abdominal:      General: Bowel sounds are normal. There is no distension.      Palpations: Abdomen is soft.   Musculoskeletal:         General: Normal range of motion.      Cervical back: Normal range of motion and neck supple.   Skin:     General: Skin is warm and dry.   Neurological:      Mental Status: He is alert and oriented to person, place, and time. Mental status is at baseline.      GCS: GCS eye subscore is 4. GCS verbal subscore is 5. GCS motor subscore is 6.   Psychiatric:         Attention and Perception: Attention normal.         Mood and Affect: Mood normal.         Speech: Speech normal.     INSTRUCTIONS FOR PATIENT:     Scheduled Follow-up, Appts Reviewed with Modifications if Needed: Yes  Future Appointments   Date Time Provider Department Center   11/27/2023  8:00 AM Shruthi Boss MD [facility] [place]       Signature: Anurag Pimentel NP    Transition of Care Visit:  I have reviewed and updated the history and problem list.  I have reconciled the medication list.  I have discussed the hospitalization and current medical issues, prognosis and plans with the patient/family.

## 2023-09-07 NOTE — ASSESSMENT & PLAN NOTE
--refilled Keppra for 30 days the patient had no medication; advised to schedule follow up with his primary care doctor as soon as possible  --denies seizures since hospital discharge

## 2023-09-07 NOTE — ASSESSMENT & PLAN NOTE
--refusing labs at this time  --last sodium level 134  --denies altered mental status/confusion/dizziness/weakness

## 2023-09-07 NOTE — ASSESSMENT & PLAN NOTE
--endorses smoking 1 pack of cigarettes per week  --A consultation session <10 minutes was held with the patient discussing the impact on the patient's health and options for smoking cessation. Will continue to revisit with the patient.

## 2023-09-07 NOTE — ASSESSMENT & PLAN NOTE
--A consultation and education session for 15 minutes was held with the patient discussing the impact of alcohol consumption on the patient's health and additional resources for alcohol cessation.  --patient verbalized that he had 1 O'Douls yesterday

## 2023-10-09 PROBLEM — I21.3 STEMI (ST ELEVATION MYOCARDIAL INFARCTION): Status: RESOLVED | Noted: 2023-07-08 | Resolved: 2023-10-09

## 2023-11-24 ENCOUNTER — TELEPHONE (OUTPATIENT)
Dept: NEUROLOGY | Facility: CLINIC | Age: 63
End: 2023-11-24
Payer: MEDICAID

## 2024-02-21 NOTE — ASSESSMENT & PLAN NOTE
A1c:   Lab Results   Component Value Date    HGBA1C 5.8 (H) 07/15/2022     No history of diabetes   ADA diet, accuchecks ACHS, hypoglycemic protocol   4 = No assist / stand by assistance

## 2024-07-25 ENCOUNTER — TELEPHONE (OUTPATIENT)
Dept: NEUROLOGY | Facility: CLINIC | Age: 64
End: 2024-07-25
Payer: MEDICAID

## 2024-07-25 NOTE — TELEPHONE ENCOUNTER
I spoke w/pt and advised him to call the cardiologist whom prescribe the plavix for refills and pt is not experiencing any neurological issues       Thank you            ----- Message from Shruthi Boss MD sent at 7/25/2024  7:39 AM CDT -----  Regarding: RE: Refill request  I've never seen this patient...  PCP to fill any existing medications otherwise if they are looking to establish care with neurology outpatient, can see one of us here and we can order / eval as needed. Plavix is generally prescribed/refilled by other providers.  ----- Message -----  From: Rhonda Campbell MA  Sent: 7/24/2024   1:57 PM CDT  To: Shruthi Boss MD  Subject: Refill request                                     ----- Message -----  From: Tamara Goel  Sent: 7/24/2024   1:54 PM CDT  To: [facility] Staff    Type:  RX Refill Request    Who Called: Pt  Refill or New Rx: Refill  RX Name and Strength: clopidogreL (PLAVIX) 75 mg tablet  How is the patient currently taking it? (ex. 1XDay):  Is this a 30 day or 90 day RX:  Preferred Pharmacy with phone number: [facility], [address] [phone]  Local or Mail Order: Local  Ordering Provider: Dr. Jeffrey, [facility]  Would the patient rather a call back or a response via [facility]? Call  Best Call Back Number:  [phone]  Additional Information: Pt's daughter in law would like to speak to someone on in office for pt. refill.  Pt is completely out of medication and no refills are indicated.

## 2024-07-25 NOTE — TELEPHONE ENCOUNTER
Lm for pt to call                   ----- Message from Shaila Castro sent at 7/25/2024 11:56 AM CDT -----  Contact: [phone]  Type: Returning a call    Who left a message?Missed the call     When did the practice call? Today morning     Does patient know what this is regarding: medication refill     Would the patient rather a call back or a response via [facility]? Call back     Comments: